# Patient Record
Sex: MALE | Race: WHITE | NOT HISPANIC OR LATINO | Employment: OTHER | RURAL
[De-identification: names, ages, dates, MRNs, and addresses within clinical notes are randomized per-mention and may not be internally consistent; named-entity substitution may affect disease eponyms.]

---

## 2017-10-23 ENCOUNTER — CONVERSION ENCOUNTER (OUTPATIENT)
Dept: FAMILY MEDICINE CLINIC | Facility: CLINIC | Age: 62
End: 2017-10-23

## 2017-10-23 LAB
ALBUMIN SERPL-MCNC: 3.8 G/DL (ref 3.6–5.1)
ALP SERPL-CCNC: 59 U/L (ref 40–115)
AST SERPL-CCNC: 22 U/L (ref 10–35)
BILIRUB SERPL-MCNC: 0.5 MG/DL (ref 0.2–1.2)
BUN SERPL-MCNC: 16 MG/DL (ref 7–25)
BUN/CREAT SERPL: 22.9 (CALC) (ref 6–22)
CALCIUM SERPL-MCNC: 8.6 MG/DL (ref 8.6–10.2)
CHLORIDE SERPL-SCNC: 108 MMOL/L (ref 98–110)
CHOLEST SERPL-MCNC: 82 MG/DL (ref 125–200)
CONV CO2: 23 MMOL/L (ref 21–33)
CONV TOTAL PROTEIN: 6.4 G/DL (ref 6.2–8.3)
CREAT UR-MCNC: 0.7 MG/DL (ref 0.76–1.46)
GLOBULIN UR ELPH-MCNC: 2.6 MG/DL (ref 2.1–3.7)
GLUCOSE UR QL: 99 MG/DL (ref 65–99)
HDLC SERPL-MCNC: 33 MG/DL
INSULIN SERPL-ACNC: 1.5 (CALC) (ref 1–2.1)
LDLC SERPL CALC-MCNC: 31 MG/DL
POTASSIUM SERPL-SCNC: 4.5 MMOL/L (ref 3.5–5.3)
SODIUM SERPL-SCNC: 138 MMOL/L (ref 135–146)
TRIGL SERPL-MCNC: 92 MG/DL

## 2017-12-18 ENCOUNTER — CONVERSION ENCOUNTER (OUTPATIENT)
Dept: FAMILY MEDICINE CLINIC | Facility: CLINIC | Age: 62
End: 2017-12-18

## 2017-12-19 LAB
ALBUMIN SERPL-MCNC: 4.3 G/DL (ref 3.6–5.1)
ALP SERPL-CCNC: 66 U/L (ref 40–115)
ALT SERPL-CCNC: 18 U/L (ref 9–46)
AST SERPL-CCNC: 16 U/L (ref 10–35)
BILIRUB SERPL-MCNC: 0.7 MG/DL (ref 0.2–1.2)
BUN SERPL-MCNC: 15 MG/DL (ref 7–25)
BUN/CREAT SERPL: NORMAL (CALC) (ref 6–22)
CALCIUM SERPL-MCNC: 8.9 MG/DL (ref 8.6–10.3)
CHLORIDE SERPL-SCNC: 105 MMOL/L (ref 98–110)
CHOLEST SERPL-MCNC: 142 MG/DL
CHOLEST/HDLC SERPL: 3.8 (CALC)
CONV CO2: 26 MMOL/L (ref 20–31)
CONV TOTAL PROTEIN: 7.2 G/DL (ref 6.1–8.1)
CREAT UR-MCNC: 0.73 MG/DL (ref 0.7–1.25)
GLOBULIN UR ELPH-MCNC: 2.9 MG/DL (ref 1.9–3.7)
GLUCOSE UR QL: 92 MG/DL (ref 65–99)
HBA1C MFR BLD: 5.7 %
HDLC SERPL-MCNC: 37 MG/DL
INSULIN SERPL-ACNC: 1.5 (CALC) (ref 1–2.5)
LDLC SERPL CALC-MCNC: 86 MG/DL
NONHDLC SERPL-MCNC: 105 MG/DL
POTASSIUM SERPL-SCNC: 4.5 MMOL/L (ref 3.5–5.3)
SODIUM SERPL-SCNC: 138 MMOL/L (ref 135–146)
TRIGL SERPL-MCNC: 101 MG/DL

## 2018-04-24 ENCOUNTER — CONVERSION ENCOUNTER (OUTPATIENT)
Dept: FAMILY MEDICINE CLINIC | Facility: CLINIC | Age: 63
End: 2018-04-24

## 2018-04-25 LAB
ALBUMIN SERPL-MCNC: 4.3 G/DL (ref 3.6–5.1)
ALP SERPL-CCNC: 55 U/L (ref 40–115)
ALT SERPL-CCNC: 15 U/L (ref 9–46)
AST SERPL-CCNC: 12 U/L (ref 10–35)
BILIRUB SERPL-MCNC: 0.8 MG/DL (ref 0.2–1.2)
BUN SERPL-MCNC: 19 MG/DL (ref 7–25)
BUN/CREAT SERPL: ABNORMAL (CALC) (ref 6–22)
CALCIUM SERPL-MCNC: 8.9 MG/DL (ref 8.6–10.3)
CHLORIDE SERPL-SCNC: 104 MMOL/L (ref 98–110)
CHOLEST SERPL-MCNC: 110 MG/DL
CHOLEST/HDLC SERPL: 2.8 (CALC)
CONV CO2: 31 MMOL/L (ref 20–31)
CONV TOTAL PROTEIN: 6.9 G/DL (ref 6.1–8.1)
CREAT UR-MCNC: 0.73 MG/DL (ref 0.7–1.25)
GLOBULIN UR ELPH-MCNC: 2.6 MG/DL (ref 1.9–3.7)
GLUCOSE UR QL: 105 MG/DL (ref 65–99)
HBA1C MFR BLD: 5.8 %
HDLC SERPL-MCNC: 40 MG/DL
INSULIN SERPL-ACNC: 1.7 (CALC) (ref 1–2.5)
LDLC SERPL CALC-MCNC: 55 MG/DL
NONHDLC SERPL-MCNC: 70 MG/DL
POTASSIUM SERPL-SCNC: 4.4 MMOL/L (ref 3.5–5.3)
SODIUM SERPL-SCNC: 139 MMOL/L (ref 135–146)
TRIGL SERPL-MCNC: 74 MG/DL

## 2018-12-03 ENCOUNTER — HOSPITAL ENCOUNTER (OUTPATIENT)
Dept: OTHER | Facility: HOSPITAL | Age: 63
Discharge: HOME | End: 2018-12-03
Attending: FAMILY MEDICINE | Admitting: FAMILY MEDICINE

## 2018-12-10 ENCOUNTER — HOSPITAL ENCOUNTER (OUTPATIENT)
Dept: OTHER | Facility: HOSPITAL | Age: 63
Discharge: HOME | End: 2018-12-10
Attending: FAMILY MEDICINE | Admitting: FAMILY MEDICINE

## 2018-12-26 ENCOUNTER — HOSPITAL ENCOUNTER (OUTPATIENT)
Dept: OTHER | Facility: HOSPITAL | Age: 63
Discharge: HOME | End: 2018-12-26
Attending: FAMILY MEDICINE | Admitting: FAMILY MEDICINE

## 2019-02-13 ENCOUNTER — CONVERSION ENCOUNTER (OUTPATIENT)
Dept: FAMILY MEDICINE CLINIC | Facility: CLINIC | Age: 64
End: 2019-02-13

## 2019-02-13 ENCOUNTER — HOSPITAL ENCOUNTER (OUTPATIENT)
Dept: OTHER | Facility: HOSPITAL | Age: 64
Discharge: HOME | End: 2019-02-13
Attending: FAMILY MEDICINE | Admitting: FAMILY MEDICINE

## 2019-02-14 LAB
ALBUMIN SERPL-MCNC: 4.3 G/DL (ref 3.6–5.1)
ALP SERPL-CCNC: 58 U/L (ref 40–115)
ALT SERPL-CCNC: 21 U/L (ref 9–46)
AST SERPL-CCNC: 14 U/L (ref 10–35)
BILIRUB SERPL-MCNC: 0.7 MG/DL (ref 0.2–1.2)
BUN SERPL-MCNC: 17 MG/DL (ref 7–25)
BUN/CREAT SERPL: ABNORMAL (CALC) (ref 6–22)
CALCIUM SERPL-MCNC: 9.3 MG/DL (ref 8.6–10.3)
CHLORIDE SERPL-SCNC: 103 MMOL/L (ref 98–110)
CHOLEST SERPL-MCNC: 124 MG/DL
CHOLEST/HDLC SERPL: 3.5 (CALC)
CONV CO2: 29 MMOL/L (ref 20–32)
CONV TOTAL PROTEIN: 7.5 G/DL (ref 6.1–8.1)
CREAT UR-MCNC: 0.76 MG/DL (ref 0.7–1.25)
GLOBULIN UR ELPH-MCNC: 3.2 MG/DL (ref 1.9–3.7)
GLUCOSE UR QL: 110 MG/DL (ref 65–99)
HBA1C MFR BLD: 6.1 %
HDLC SERPL-MCNC: 35 MG/DL
INSULIN SERPL-ACNC: 1.3 (CALC) (ref 1–2.5)
LDLC SERPL CALC-MCNC: 68 MG/DL
NONHDLC SERPL-MCNC: 89 MG/DL
POTASSIUM SERPL-SCNC: 4.4 MMOL/L (ref 3.5–5.3)
SODIUM SERPL-SCNC: 139 MMOL/L (ref 135–146)
TRIGL SERPL-MCNC: 128 MG/DL

## 2019-07-08 RX ORDER — ATORVASTATIN CALCIUM 40 MG/1
TABLET, FILM COATED ORAL
Qty: 30 TABLET | Refills: 3 | Status: SHIPPED | OUTPATIENT
Start: 2019-07-08 | End: 2019-08-28 | Stop reason: SDUPTHER

## 2019-07-08 NOTE — TELEPHONE ENCOUNTER
Future Appointments       Provider Department Center    11/20/2019 9:00 AM Zhou Keen MD [facility]    11/26/2019 9:00 AM Zhou Keen MD [facility]

## 2019-07-31 PROBLEM — J45.909 REACTIVE AIRWAY DISEASE: Status: ACTIVE | Noted: 2019-07-31

## 2019-07-31 PROBLEM — M50.20 DISPLACEMENT OF CERVICAL INTERVERTEBRAL DISC: Status: ACTIVE | Noted: 2019-07-31

## 2019-07-31 PROBLEM — E11.43 TYPE 2 DIABETES MELLITUS WITH DIABETIC AUTONOMIC NEUROPATHY, WITHOUT LONG-TERM CURRENT USE OF INSULIN: Status: ACTIVE | Noted: 2019-07-31

## 2019-07-31 PROBLEM — E66.3 OVERWEIGHT: Status: ACTIVE | Noted: 2019-07-31

## 2019-07-31 PROBLEM — J44.9 ASTHMATIC BRONCHITIS , CHRONIC (HCC): Status: ACTIVE | Noted: 2019-07-31

## 2019-07-31 PROBLEM — J30.1 CHRONIC ALLERGIC RHINITIS DUE TO POLLEN, UNSPECIFIED SEASONALITY: Status: ACTIVE | Noted: 2019-07-31

## 2019-07-31 PROBLEM — J44.89 ASTHMATIC BRONCHITIS , CHRONIC: Status: ACTIVE | Noted: 2019-07-31

## 2019-07-31 PROBLEM — Z77.090 HISTORY OF ASBESTOS EXPOSURE: Status: ACTIVE | Noted: 2019-07-31

## 2019-07-31 PROBLEM — M13.0 POLYARTHROPATHY OR POLYARTHRITIS: Status: ACTIVE | Noted: 2019-07-31

## 2019-07-31 PROBLEM — E78.2 MIXED HYPERLIPIDEMIA: Status: ACTIVE | Noted: 2019-07-31

## 2019-07-31 PROBLEM — H91.93 BILATERAL HEARING LOSS: Status: ACTIVE | Noted: 2019-07-31

## 2019-07-31 PROBLEM — R93.89 ABNORMAL COMPUTERIZED AXIAL TOMOGRAPHY OF CHEST: Status: ACTIVE | Noted: 2019-07-31

## 2019-07-31 RX ORDER — FLUTICASONE PROPIONATE 50 MCG
2 SPRAY, SUSPENSION (ML) NASAL DAILY
COMMUNITY
Start: 2019-01-28 | End: 2022-09-12 | Stop reason: SDUPTHER

## 2019-07-31 RX ORDER — CYCLOBENZAPRINE HCL 10 MG
1 TABLET ORAL
COMMUNITY
Start: 2018-11-13 | End: 2021-01-18 | Stop reason: SDUPTHER

## 2019-07-31 RX ORDER — LORATADINE 10 MG/1
1 TABLET ORAL DAILY
COMMUNITY
Start: 2018-11-13 | End: 2022-09-12 | Stop reason: SDUPTHER

## 2019-07-31 RX ORDER — IBUPROFEN 800 MG/1
TABLET ORAL
COMMUNITY
Start: 2018-11-13 | End: 2020-06-24 | Stop reason: SDUPTHER

## 2019-07-31 RX ORDER — ALBUTEROL SULFATE 90 UG/1
2 AEROSOL, METERED RESPIRATORY (INHALATION) EVERY 4 HOURS
COMMUNITY
Start: 2019-02-14 | End: 2020-03-02

## 2019-08-01 ENCOUNTER — OFFICE VISIT (OUTPATIENT)
Dept: FAMILY MEDICINE CLINIC | Facility: CLINIC | Age: 64
End: 2019-08-01

## 2019-08-01 VITALS
HEART RATE: 76 BPM | OXYGEN SATURATION: 97 % | WEIGHT: 249 LBS | SYSTOLIC BLOOD PRESSURE: 136 MMHG | TEMPERATURE: 96.2 F | DIASTOLIC BLOOD PRESSURE: 76 MMHG | BODY MASS INDEX: 35.65 KG/M2 | HEIGHT: 70 IN | RESPIRATION RATE: 18 BRPM

## 2019-08-01 DIAGNOSIS — Z02.4 ENCOUNTER FOR CDL (COMMERCIAL DRIVING LICENSE) EXAM: Primary | ICD-10-CM

## 2019-08-01 DIAGNOSIS — E11.43 TYPE 2 DIABETES MELLITUS WITH DIABETIC AUTONOMIC NEUROPATHY, WITHOUT LONG-TERM CURRENT USE OF INSULIN (HCC): ICD-10-CM

## 2019-08-01 LAB
BILIRUB BLD-MCNC: NEGATIVE MG/DL
CLARITY, POC: CLEAR
COLOR UR: YELLOW
GLUCOSE BLDC GLUCOMTR-MCNC: 146 MG/DL (ref 70–130)
GLUCOSE UR STRIP-MCNC: NEGATIVE MG/DL
HBA1C MFR BLD: 6.5 %
KETONES UR QL: NEGATIVE
LEUKOCYTE EST, POC: NEGATIVE
NITRITE UR-MCNC: NEGATIVE MG/ML
PH UR: 7 [PH] (ref 5–8)
PROT UR STRIP-MCNC: NEGATIVE MG/DL
RBC # UR STRIP: NEGATIVE /UL
SP GR UR: 1.01 (ref 1–1.03)
UROBILINOGEN UR QL: NORMAL

## 2019-08-01 PROCEDURE — 81002 URINALYSIS NONAUTO W/O SCOPE: CPT | Performed by: FAMILY MEDICINE

## 2019-08-01 PROCEDURE — DOTPHY: Performed by: FAMILY MEDICINE

## 2019-08-01 NOTE — PROGRESS NOTES
Subjective   Jeffrey Aviles is a 63 y.o. male    Medical Examination  Chief Complaint   Patient presents with   • 's License Exam       History of Present Illness         I personally reviewed and updated the patient's allergies, medications, problem list, and past medical, surgical, social, and family history.     Family History   Problem Relation Age of Onset   • Cerebral aneurysm Mother          at 42   • Hypertension Mother    • Hyperlipidemia Father    • Hypertension Father    • Breast cancer Sister    • Prostate cancer Maternal Grandfather    • Diabetes Maternal Uncle    • Heart disease Maternal Uncle    • Lung cancer Maternal Uncle        Social History     Tobacco Use   • Smoking status: Never Smoker   • Smokeless tobacco: Never Used   Substance Use Topics   • Alcohol use: No     Frequency: Never   • Drug use: No       Past Surgical History:   Procedure Laterality Date   • PAROTID DUCT LIGATION         Patient Active Problem List   Diagnosis   • Abnormal computerized axial tomography of chest   • Asthmatic bronchitis , chronic (CMS/HCC)   • Bilateral hearing loss   • Chronic allergic rhinitis due to pollen, unspecified seasonality   • Displacement of cervical intervertebral disc   • History of asbestos exposure   • Mixed hyperlipidemia   • Overweight   • Polyarthropathy or polyarthritis   • Reactive airway disease   • Type 2 diabetes mellitus with diabetic autonomic neuropathy, without long-term current use of insulin (CMS/HCC)   • Abnormal chest CT   • Encounter for CDL (commercial driving license) exam         Current Outpatient Medications:   •  albuterol sulfate HFA (PROAIR HFA) 108 (90 Base) MCG/ACT inhaler, Inhale 2 puffs Every 4 (Four) Hours., Disp: , Rfl:   •  aspirin 81 MG tablet, Take 81 mg by mouth Daily., Disp: , Rfl:   •  atorvastatin (LIPITOR) 40 MG tablet, TAKE 1 TABLET BY MOUTH EVERY DAY IN THE EVENING, Disp: 30 tablet, Rfl: 3  •  cyclobenzaprine  "(FLEXERIL) 10 MG tablet, Take 1 tablet by mouth every night at bedtime., Disp: , Rfl:   •  fluticasone (FLONASE) 50 MCG/ACT nasal spray, 2 sprays into the nostril(s) as directed by provider Daily., Disp: , Rfl:   •  ibuprofen (ADVIL,MOTRIN) 800 MG tablet, Take  by mouth., Disp: , Rfl:   •  loratadine (CLARITIN) 10 MG tablet, Take 1 tablet by mouth Daily., Disp: , Rfl:   •  metFORMIN (GLUCOPHAGE) 1000 MG tablet, Take 1 tablet by mouth 2 (Two) Times a Day., Disp: , Rfl:   •  mometasone (ASMANEX TWISTHALER) inhaler 110 mcg/inhalation, Inhale 1 puff Daily., Disp: , Rfl:          Review of Systems   Constitutional: Negative for chills and diaphoresis.   Eyes: Negative for visual disturbance.   Respiratory: Negative for shortness of breath.    Cardiovascular: Negative for chest pain and palpitations.   Gastrointestinal: Negative for abdominal pain and nausea.   Endocrine: Negative for polydipsia and polyphagia.   Musculoskeletal: Negative for neck stiffness.   Skin: Negative for color change and pallor.   Neurological: Negative for seizures and syncope.   Hematological: Negative for adenopathy.       Objective   /76 (BP Location: Left arm, Patient Position: Sitting, Cuff Size: Adult)   Pulse 76   Temp 96.2 °F (35.7 °C) (Oral)   Resp 18   Ht 177.8 cm (70\")   Wt 113 kg (249 lb)   SpO2 97% Comment: Room air  BMI 35.73 kg/m²   Wt Readings from Last 3 Encounters:   08/01/19 113 kg (249 lb)   06/05/19 108 kg (238 lb 2 oz)   02/14/19 106 kg (233 lb 6 oz)       Physical Exam   Constitutional: He is oriented to person, place, and time. He appears well-developed and well-nourished.   Cardiovascular: Normal rate, regular rhythm, S1 normal, S2 normal, normal heart sounds, intact distal pulses and normal pulses. Exam reveals no gallop and no friction rub.   No murmur heard.  Pulmonary/Chest: Effort normal and breath sounds normal. No accessory muscle usage or stridor. He has no decreased breath sounds. He has no wheezes. " He has no rhonchi. He has no rales.   Abdominal: Soft. Normal appearance, normal aorta and bowel sounds are normal. He exhibits no distension, no pulsatile midline mass and no mass. There is no hepatosplenomegaly. There is no tenderness. There is no rigidity, no rebound, no guarding, no CVA tenderness and negative Ordaz's sign. No hernia.   Neurological: He is alert and oriented to person, place, and time. He has normal strength and normal reflexes. He displays no tremor. No cranial nerve deficit or sensory deficit. He exhibits normal muscle tone. He displays no seizure activity. Coordination and gait normal.   Skin: Skin is warm and dry. Turgor is normal. He is not diaphoretic. No pallor.         Assessment/Plan   Problem List Items Addressed This Visit        Endocrine    Type 2 diabetes mellitus with diabetic autonomic neuropathy, without long-term current use of insulin (CMS/[facility])    Overview     In -house A1c done 06/05/19, read by me, reviewed with pt.  A1c was 6.3 up from 6.1  Worsening, pt non-compiant with medication.  Encouraged to watch sugar intake, exercise more and lose weight.   adequate diet and exercise  Not monitoring sugars  Goals developed at last visit were not met   Follow up in 6 months  Care management needs are self addressed.       Self-Management abilities addressed and patient is capable of managing his own disease         Relevant Orders    POCT Glucose (Completed)    POC Glycosylated Hemoglobin (Hb A1C) (Completed)       Other    Encounter for CDL (commercial driving license) exam - Primary    Overview     Doing well, cleared to drive  Diabetes under good control         Relevant Orders    POCT urinalysis dipstick, manual (Completed)              Expected course, medications, and adverse effects discussed.  Call or return if worsening or persistent symptoms.

## 2019-08-15 ENCOUNTER — TELEPHONE (OUTPATIENT)
Dept: FAMILY MEDICINE CLINIC | Facility: CLINIC | Age: 64
End: 2019-08-15

## 2019-08-28 RX ORDER — ATORVASTATIN CALCIUM 40 MG/1
TABLET, FILM COATED ORAL
Qty: 30 TABLET | Refills: 3 | Status: SHIPPED | OUTPATIENT
Start: 2019-08-28 | End: 2019-10-07 | Stop reason: SDUPTHER

## 2019-10-07 RX ORDER — ATORVASTATIN CALCIUM 40 MG/1
TABLET, FILM COATED ORAL
Qty: 30 TABLET | Refills: 3 | Status: SHIPPED | OUTPATIENT
Start: 2019-10-07 | End: 2020-01-20

## 2019-11-20 ENCOUNTER — OFFICE VISIT (OUTPATIENT)
Dept: FAMILY MEDICINE CLINIC | Facility: CLINIC | Age: 64
End: 2019-11-20

## 2019-11-20 DIAGNOSIS — R35.1 NOCTURIA: ICD-10-CM

## 2019-11-20 DIAGNOSIS — R00.1 SINUS BRADYCARDIA: Primary | ICD-10-CM

## 2019-11-20 DIAGNOSIS — E78.2 MIXED HYPERLIPIDEMIA: ICD-10-CM

## 2019-11-20 DIAGNOSIS — Z12.5 SCREENING PSA (PROSTATE SPECIFIC ANTIGEN): ICD-10-CM

## 2019-11-20 DIAGNOSIS — R53.83 LETHARGY: ICD-10-CM

## 2019-11-20 DIAGNOSIS — M54.50 MIDLINE LOW BACK PAIN WITHOUT SCIATICA, UNSPECIFIED CHRONICITY: ICD-10-CM

## 2019-11-20 DIAGNOSIS — E11.43 TYPE 2 DIABETES MELLITUS WITH DIABETIC AUTONOMIC NEUROPATHY, WITHOUT LONG-TERM CURRENT USE OF INSULIN (HCC): ICD-10-CM

## 2019-11-20 DIAGNOSIS — H90.0 CONDUCTIVE HEARING LOSS, BILATERAL: ICD-10-CM

## 2019-11-20 LAB
BILIRUB BLD-MCNC: NEGATIVE MG/DL
CLARITY, POC: CLEAR
COLOR UR: YELLOW
GLUCOSE UR STRIP-MCNC: NEGATIVE MG/DL
KETONES UR QL: NEGATIVE
LEUKOCYTE EST, POC: NEGATIVE
NITRITE UR-MCNC: NEGATIVE MG/ML
PH UR: 5 [PH] (ref 5–8)
PROT UR STRIP-MCNC: NEGATIVE MG/DL
RBC # UR STRIP: NEGATIVE /UL
SP GR UR: 1.02 (ref 1–1.03)
UROBILINOGEN UR QL: NORMAL

## 2019-11-20 PROCEDURE — 99214 OFFICE O/P EST MOD 30 MIN: CPT | Performed by: FAMILY MEDICINE

## 2019-11-20 PROCEDURE — 36415 COLL VENOUS BLD VENIPUNCTURE: CPT | Performed by: FAMILY MEDICINE

## 2019-11-20 PROCEDURE — 81002 URINALYSIS NONAUTO W/O SCOPE: CPT | Performed by: FAMILY MEDICINE

## 2019-11-20 PROCEDURE — 93000 ELECTROCARDIOGRAM COMPLETE: CPT | Performed by: FAMILY MEDICINE

## 2019-11-20 NOTE — PROGRESS NOTES
Subjective   Jeffrey Aviles JR is a 64 y.o. male.     Diabetes   He presents for his follow-up diabetic visit. He has type 2 diabetes mellitus. His disease course has been stable. There are no hypoglycemic associated symptoms. Associated symptoms include fatigue. Pertinent negatives for diabetes include no chest pain. There are no hypoglycemic complications. Symptoms are stable. There are no diabetic complications.   Back Pain   This is a recurrent problem. The current episode started more than 1 month ago. The problem occurs intermittently. The problem has been gradually worsening since onset. The pain is present in the lumbar spine. The symptoms are aggravated by sitting and position. Stiffness is present all day. Pertinent negatives include no chest pain.   Heart Problem   This is a recurrent (bradycardia) problem. The current episode started more than 1 year ago. The problem occurs intermittently. Associated symptoms include fatigue. Pertinent negatives include no chest pain or joint swelling.   Urinary Frequency    This is a recurrent problem. The current episode started more than 1 year ago. The problem occurs intermittently. The problem has been unchanged. Associated symptoms include frequency (nocturia).        The following portions of the patient's history were reviewed and updated as appropriate: current medications, past family history, past medical history, past social history, past surgical history and problem list.    Family History   Problem Relation Age of Onset   • Cerebral aneurysm Mother          at 42   • Hypertension Mother    • Hyperlipidemia Father    • Hypertension Father    • Breast cancer Sister    • Prostate cancer Maternal Grandfather    • Diabetes Maternal Uncle    • Heart disease Maternal Uncle    • Lung cancer Maternal Uncle        Social History     Tobacco Use   • Smoking status: Never Smoker   • Smokeless tobacco: Never Used   Substance Use Topics   • Alcohol use: No      Frequency: Never   • Drug use: No       Past Surgical History:   Procedure Laterality Date   • PAROTID DUCT LIGATION         Patient Active Problem List   Diagnosis   • Abnormal computerized axial tomography of chest   • Asthmatic bronchitis , chronic (CMS/HCC)   • Bilateral hearing loss   • Chronic allergic rhinitis due to pollen, unspecified seasonality   • Displacement of cervical intervertebral disc   • History of asbestos exposure   • Mixed hyperlipidemia   • Overweight   • Polyarthropathy or polyarthritis   • Reactive airway disease   • Type 2 diabetes mellitus with diabetic autonomic neuropathy, without long-term current use of insulin (CMS/HCC)   • Abnormal chest CT   • Encounter for CDL (commercial driving license) exam   • Sinus bradycardia   • Screening PSA (prostate specific antigen)   • Lethargy   • Low back pain   • Nocturia       Current Outpatient Medications on File Prior to Visit   Medication Sig Dispense Refill   • albuterol sulfate HFA (PROAIR HFA) 108 (90 Base) MCG/ACT inhaler Inhale 2 puffs Every 4 (Four) Hours.     • aspirin 81 MG tablet Take 81 mg by mouth Daily.     • atorvastatin (LIPITOR) 40 MG tablet TAKE 1 TABLET BY MOUTH EVERY DAY IN THE EVENING 30 tablet 3   • cyclobenzaprine (FLEXERIL) 10 MG tablet Take 1 tablet by mouth every night at bedtime.     • fluticasone (FLONASE) 50 MCG/ACT nasal spray 2 sprays into the nostril(s) as directed by provider Daily.     • ibuprofen (ADVIL,MOTRIN) 800 MG tablet Take  by mouth.     • loratadine (CLARITIN) 10 MG tablet Take 1 tablet by mouth Daily.     • metFORMIN (GLUCOPHAGE) 1000 MG tablet TAKE 1 TABLET BY MOUTH TWICE A DAY 60 tablet 2   • mometasone (ASMANEX TWISTHALER) inhaler 110 mcg/inhalation Inhale 1 puff Daily.       No current facility-administered medications on file prior to visit.        No Known Allergies    Review of Systems   Constitutional: Positive for fatigue.   HENT: Negative for hearing loss.    Respiratory: Negative for  shortness of breath.    Cardiovascular: Negative for chest pain and palpitations.   Genitourinary: Positive for frequency (nocturia).        Nocturia   Musculoskeletal: Positive for back pain. Negative for joint swelling.   Neurological: Negative for memory problem.       Objective   There were no vitals taken for this visit.  Physical Exam   Constitutional: He is oriented to person, place, and time. He appears well-developed and well-nourished. He is cooperative.   HENT:   Head: Normocephalic.   Neck: Trachea normal. Neck supple. Carotid bruit is not present. No thyromegaly present.   Cardiovascular: Normal rate and regular rhythm. Exam reveals no gallop and no friction rub.   No murmur heard.  Pulmonary/Chest: Effort normal and breath sounds normal. He has no wheezes.   Musculoskeletal:        Arms:   Jeffrey had a diabetic foot exam performed (all normal) today.  Neurological: He is alert and oriented to person, place, and time. No cranial nerve deficit.   Skin: Skin is dry. No rash noted. Nails show no clubbing.   Nursing note and vitals reviewed.        Assessment/Plan .  Problem List Items Addressed This Visit        Cardiovascular and Mediastinum    Mixed hyperlipidemia    Overview     Will draw labs today and discuss results at next visit.          Relevant Orders    Lipid Panel With / Chol / HDL Ratio (Completed)    Comprehensive Metabolic Panel (Completed)    Sinus bradycardia - Primary    Current Assessment & Plan     EKG done today shows sinus tonja.  Flow pulses         Relevant Orders    ECG 12 Lead       Endocrine    Type 2 diabetes mellitus with diabetic autonomic neuropathy, without long-term current use of insulin (CMS/[facility])    Overview     Will draw labs today and discuss results at next visit.  Monofilment done today and was normal.         Current Assessment & Plan     Needs a micro album         Relevant Orders    Hemoglobin A1c (Completed)       Nervous and Auditory    Bilateral hearing loss     Overview     Patient declines audiometry.          Low back pain    Current Assessment & Plan     Probably due to paraspinous muscle spasm.  Recommended stretching, massage, apply ICE and heat.  Offered xrays, patient prefers to hold on this for now,         Relevant Orders    CK (Completed)       Genitourinary    Nocturia    Current Assessment & Plan     Mild- UA done 11-20-19 was normal  Will follow conservatively         Relevant Orders    POCT urinalysis dipstick, manual (Completed)       Other    Lethargy    Overview     Worsening;  Will draw labs today and discuss results at next visit.          Relevant Orders    TSH (Completed)    CBC & Differential (Completed)    Screening PSA (prostate specific antigen)    Relevant Orders    PSA Screen (Completed)          ECG 12 Lead  Date/Time: 11/20/2019 9:55 AM  Performed by: Zhou Keen MD  Authorized by: Zhou Keen MD   Comparison: compared with previous ECG from 6/26/2017  Rhythm: sinus bradycardia  Rate: normal  Conduction: conduction normal  ST Segments: ST segments normal  T Waves: T waves normal  QRS axis: normal  Other findings: non-specific ST-T wave changes    Clinical impression: normal ECG

## 2019-11-20 NOTE — ASSESSMENT & PLAN NOTE
Probably due to paraspinous muscle spasm.  Recommended stretching, massage, apply ICE and heat.  Offered xrays, patient prefers to hold on this for now,

## 2019-11-21 LAB
ALBUMIN SERPL-MCNC: 4.2 G/DL (ref 3.6–4.8)
ALBUMIN/GLOB SERPL: 1.7 {RATIO} (ref 1.2–2.2)
ALP SERPL-CCNC: 62 IU/L (ref 39–117)
ALT SERPL-CCNC: 23 IU/L (ref 0–44)
AST SERPL-CCNC: 15 IU/L (ref 0–40)
BASOPHILS # BLD AUTO: 0 X10E3/UL (ref 0–0.2)
BASOPHILS NFR BLD AUTO: 0 %
BILIRUB SERPL-MCNC: 0.7 MG/DL (ref 0–1.2)
BUN SERPL-MCNC: 18 MG/DL (ref 8–27)
BUN/CREAT SERPL: 25 (ref 10–24)
CALCIUM SERPL-MCNC: 8.7 MG/DL (ref 8.6–10.2)
CHLORIDE SERPL-SCNC: 102 MMOL/L (ref 96–106)
CHOLEST SERPL-MCNC: 111 MG/DL (ref 100–199)
CHOLEST/HDLC SERPL: 3 RATIO (ref 0–5)
CK SERPL-CCNC: 63 U/L (ref 24–204)
CO2 SERPL-SCNC: 22 MMOL/L (ref 20–29)
CREAT SERPL-MCNC: 0.73 MG/DL (ref 0.76–1.27)
EOSINOPHIL # BLD AUTO: 0.3 X10E3/UL (ref 0–0.4)
EOSINOPHIL NFR BLD AUTO: 4 %
ERYTHROCYTE [DISTWIDTH] IN BLOOD BY AUTOMATED COUNT: 14.1 % (ref 12.3–15.4)
GLOBULIN SER CALC-MCNC: 2.5 G/DL (ref 1.5–4.5)
GLUCOSE SERPL-MCNC: 108 MG/DL (ref 65–99)
HBA1C MFR BLD: 6.3 % (ref 4.8–5.6)
HCT VFR BLD AUTO: 43.5 % (ref 37.5–51)
HDLC SERPL-MCNC: 37 MG/DL
HGB BLD-MCNC: 14.2 G/DL (ref 13–17.7)
IMM GRANULOCYTES # BLD AUTO: 0 X10E3/UL (ref 0–0.1)
IMM GRANULOCYTES NFR BLD AUTO: 0 %
LDLC SERPL CALC-MCNC: 51 MG/DL (ref 0–99)
LYMPHOCYTES # BLD AUTO: 3.5 X10E3/UL (ref 0.7–3.1)
LYMPHOCYTES NFR BLD AUTO: 38 %
MCH RBC QN AUTO: 29.9 PG (ref 26.6–33)
MCHC RBC AUTO-ENTMCNC: 32.6 G/DL (ref 31.5–35.7)
MCV RBC AUTO: 92 FL (ref 79–97)
MONOCYTES # BLD AUTO: 0.7 X10E3/UL (ref 0.1–0.9)
MONOCYTES NFR BLD AUTO: 8 %
NEUTROPHILS # BLD AUTO: 4.7 X10E3/UL (ref 1.4–7)
NEUTROPHILS NFR BLD AUTO: 50 %
PLATELET # BLD AUTO: 248 X10E3/UL (ref 150–450)
POTASSIUM SERPL-SCNC: 4.2 MMOL/L (ref 3.5–5.2)
PROT SERPL-MCNC: 6.7 G/DL (ref 6–8.5)
PSA SERPL-MCNC: 0.5 NG/ML (ref 0–4)
RBC # BLD AUTO: 4.75 X10E6/UL (ref 4.14–5.8)
SODIUM SERPL-SCNC: 138 MMOL/L (ref 134–144)
TRIGL SERPL-MCNC: 115 MG/DL (ref 0–149)
TSH SERPL DL<=0.005 MIU/L-ACNC: 2.06 UIU/ML (ref 0.45–4.5)
VLDLC SERPL CALC-MCNC: 23 MG/DL (ref 5–40)
WBC # BLD AUTO: 9.3 X10E3/UL (ref 3.4–10.8)

## 2019-11-25 DIAGNOSIS — E11.43 TYPE 2 DIABETES MELLITUS WITH DIABETIC AUTONOMIC NEUROPATHY, WITHOUT LONG-TERM CURRENT USE OF INSULIN (HCC): Primary | ICD-10-CM

## 2019-11-27 ENCOUNTER — OFFICE VISIT (OUTPATIENT)
Dept: FAMILY MEDICINE CLINIC | Facility: CLINIC | Age: 64
End: 2019-11-27

## 2019-11-27 VITALS
WEIGHT: 243.6 LBS | RESPIRATION RATE: 18 BRPM | OXYGEN SATURATION: 96 % | HEART RATE: 72 BPM | HEIGHT: 70 IN | BODY MASS INDEX: 34.88 KG/M2 | TEMPERATURE: 98.3 F | DIASTOLIC BLOOD PRESSURE: 75 MMHG | SYSTOLIC BLOOD PRESSURE: 116 MMHG

## 2019-11-27 DIAGNOSIS — M54.50 MIDLINE LOW BACK PAIN WITHOUT SCIATICA, UNSPECIFIED CHRONICITY: ICD-10-CM

## 2019-11-27 DIAGNOSIS — R00.1 SINUS BRADYCARDIA: ICD-10-CM

## 2019-11-27 DIAGNOSIS — Z83.71 FAMILY HISTORY OF POLYPS IN THE COLON: ICD-10-CM

## 2019-11-27 DIAGNOSIS — H83.3X3 NOISE-INDUCED HEARING LOSS OF BOTH EARS: ICD-10-CM

## 2019-11-27 DIAGNOSIS — Z77.090 HISTORY OF ASBESTOS EXPOSURE: ICD-10-CM

## 2019-11-27 DIAGNOSIS — E66.3 OVERWEIGHT: ICD-10-CM

## 2019-11-27 DIAGNOSIS — K52.9 GASTROENTERITIS, ACUTE: ICD-10-CM

## 2019-11-27 DIAGNOSIS — M50.20 DISPLACEMENT OF CERVICAL INTERVERTEBRAL DISC: Primary | ICD-10-CM

## 2019-11-27 DIAGNOSIS — E78.2 MIXED HYPERLIPIDEMIA: ICD-10-CM

## 2019-11-27 DIAGNOSIS — M79.10 MYALGIA: ICD-10-CM

## 2019-11-27 DIAGNOSIS — M19.90 ARTHRITIS: ICD-10-CM

## 2019-11-27 DIAGNOSIS — J45.909 REACTIVE AIRWAY DISEASE WITHOUT COMPLICATION, UNSPECIFIED ASTHMA SEVERITY, UNSPECIFIED WHETHER PERSISTENT: ICD-10-CM

## 2019-11-27 DIAGNOSIS — R93.89 ABNORMAL COMPUTERIZED AXIAL TOMOGRAPHY OF CHEST: ICD-10-CM

## 2019-11-27 DIAGNOSIS — Z12.5 SCREENING PSA (PROSTATE SPECIFIC ANTIGEN): ICD-10-CM

## 2019-11-27 DIAGNOSIS — E11.43 TYPE 2 DIABETES MELLITUS WITH DIABETIC AUTONOMIC NEUROPATHY, WITHOUT LONG-TERM CURRENT USE OF INSULIN (HCC): ICD-10-CM

## 2019-11-27 DIAGNOSIS — Z00.00 ANNUAL PHYSICAL EXAM: ICD-10-CM

## 2019-11-27 DIAGNOSIS — Z02.4 ENCOUNTER FOR CDL (COMMERCIAL DRIVING LICENSE) EXAM: ICD-10-CM

## 2019-11-27 DIAGNOSIS — R93.89 ABNORMAL CHEST CT: ICD-10-CM

## 2019-11-27 DIAGNOSIS — R35.1 NOCTURIA: ICD-10-CM

## 2019-11-27 DIAGNOSIS — M62.830 PARASPINAL MUSCLE SPASM: ICD-10-CM

## 2019-11-27 DIAGNOSIS — R53.83 LETHARGY: ICD-10-CM

## 2019-11-27 DIAGNOSIS — J44.9 ASTHMATIC BRONCHITIS , CHRONIC (HCC): ICD-10-CM

## 2019-11-27 DIAGNOSIS — J30.1 CHRONIC ALLERGIC RHINITIS DUE TO POLLEN: ICD-10-CM

## 2019-11-27 DIAGNOSIS — J06.9 UPPER RESPIRATORY TRACT INFECTION, UNSPECIFIED TYPE: ICD-10-CM

## 2019-11-27 PROBLEM — Z83.719 FAMILY HISTORY OF POLYPS IN THE COLON: Status: ACTIVE | Noted: 2019-11-27

## 2019-11-27 PROCEDURE — 99396 PREV VISIT EST AGE 40-64: CPT | Performed by: FAMILY MEDICINE

## 2019-11-27 PROCEDURE — 99213 OFFICE O/P EST LOW 20 MIN: CPT | Performed by: FAMILY MEDICINE

## 2019-11-27 NOTE — ASSESSMENT & PLAN NOTE
Labs done 11-20-19, read by me, reviewed with pt.  Trig. 115 down from 127, Tot. Chol. 111 down from 147, HDL 37 down from 38, LDL 51 down from 84.  Improved.   Encouraged to watch fatty intake, exercise more, and lose weight.   Compliant with medication    Is not getting adequate diet and exercise  Goals developed at last visit were not met because diet and exercise  Follow up in  4 months  Care management needs are self-addressed.  Self-management abilities addressed and patient is capable of managing his own disease.

## 2019-11-27 NOTE — ASSESSMENT & PLAN NOTE
Mild.  Labs done 11-20-19, read by me, reviewed with pt.  TSH was normal, CBC showed Lymphocytes was 3.5 up from 3,089

## 2019-11-27 NOTE — ASSESSMENT & PLAN NOTE
Encouraged to do self-breast exam, self-testicle exams, and self derm exams. Congratulated on using seat belts.  Encouraged to do annual physical exams.  Immunization status reviewed.

## 2019-11-27 NOTE — ASSESSMENT & PLAN NOTE
Labs done 11-20-19, read by me, reviewed with pt.  A1c was 6.3 down from 6.5  Improved  Encouraged to watch sugar intake, exercise more and lose weight.   Compliant with medication.   Not monitoring sugar at home.   Follow up in 4 months  Care management needs are self-addressed.  Self-management abilities addressed and patient is capable of managing his own disease.

## 2019-11-27 NOTE — PROGRESS NOTES
Subjective   Jeffrey Aviles JR is a 64 y.o. male here for his annual physical with me. Jeffrey is here for coordination of medical care, to discuss health maintenance, disease prevention as well as to followup on medical problems. Patient has been followed by me since 1986. Patient's last CPE was 11-13-18. Activity level is moderate. Exercises 0 per week. Appetite is good. Feels poorly with few complaints. Energy level is poor. Sleeps well. Patient's last colonoscopy was 2016. He is advised to repeat in 5 years. Patient is doing routine self skin exam twice a year. Patient is doing routine self-breast exams twice a year. Patient is checking testicles twice a year.    Lab Results   Component Value Date    PSA 0.5 11/20/2019        URI    This is a new problem. The current episode started in the past 7 days. The problem has been rapidly improving. There has been no fever. Associated symptoms include abdominal pain, congestion, coughing, ear pain, headaches, sneezing and vomiting. Pertinent negatives include no chest pain, diarrhea, dysuria, nausea, neck pain, rash, rhinorrhea, sore throat or wheezing. He has tried nothing for the symptoms.   Vomiting    This is a new problem. The current episode started yesterday. The problem has been gradually improving. The emesis has an appearance of stomach contents. Associated symptoms include abdominal pain, arthralgias, chills, coughing, headaches, sweats and URI. Pertinent negatives include no chest pain, diarrhea, dizziness, fever, myalgias or weight loss. Risk factors include suspect food intake. He has tried increased fluids for the symptoms. The treatment provided mild relief.   Hyperlipidemia   This is a chronic problem. The current episode started more than 1 year ago. The problem is controlled. Recent lipid tests were reviewed and are high. Exacerbating diseases include diabetes. Factors aggravating his hyperlipidemia include fatty foods. Pertinent negatives include no  chest pain, myalgias or shortness of breath. Current antihyperlipidemic treatment includes statins. The current treatment provides mild improvement of lipids. There are no compliance problems.  Risk factors for coronary artery disease include diabetes mellitus, dyslipidemia and hypertension.   Arthritis   Presents for follow-up visit. He reports no joint swelling. The symptoms have been stable. Pertinent negatives include no diarrhea, dysuria, fatigue, fever, rash or weight loss.   Back Pain   This is a chronic problem. The current episode started more than 1 year ago. The problem occurs intermittently. The problem is unchanged. The pain is present in the lumbar spine. Associated symptoms include abdominal pain and headaches. Pertinent negatives include no chest pain, dysuria, fever, weakness or weight loss.        The following portions of the patient's history were reviewed and updated as appropriate: allergies, current medications, past family history, past medical history, past social history, past surgical history and problem list.    Past Medical History:   Diagnosis Date   • Abnormal chest CT    • Bilateral hearing loss    • History of asbestos exposure    • Mixed hyperlipidemia        Family History   Problem Relation Age of Onset   • Cerebral aneurysm Mother          at 42   • Hypertension Mother    • Hyperlipidemia Father    • Hypertension Father    • Other Father         spinal stenosis   • Asthma Father         living at 86   • Breast cancer Sister    • Hyperlipidemia Sister    • Prostate cancer Maternal Grandfather    • Diabetes Maternal Uncle    • Heart disease Maternal Uncle    • Lung cancer Maternal Uncle    • Hypertension Brother    • Hyperlipidemia Brother    • Other Brother 30        Hepatitis   • Stroke Maternal Grandmother        Social History     Socioeconomic History   • Marital status:      Spouse name: Not on file   • Number of children: Not on file   • Years of education: Not on  file   • Highest education level: Not on file   Tobacco Use   • Smoking status: Never Smoker   • Smokeless tobacco: Never Used   Substance and Sexual Activity   • Alcohol use: No     Frequency: Never   • Drug use: No   Social History Narrative     2 x.  First wife  for 2.5 years and no children with her.   2nd in 1977 has 1 son together and he adopted his wives daughter.  Just the 2 of them at home, children are out of the house.  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       Past Surgical History:   Procedure Laterality Date   • PAROTID DUCT LIGATION         Current Outpatient Medications on File Prior to Visit   Medication Sig Dispense Refill   • albuterol sulfate HFA (PROAIR HFA) 108 (90 Base) MCG/ACT inhaler Inhale 2 puffs Every 4 (Four) Hours.     • aspirin 81 MG tablet Take 81 mg by mouth Daily.     • atorvastatin (LIPITOR) 40 MG tablet TAKE 1 TABLET BY MOUTH EVERY DAY IN THE EVENING 30 tablet 3   • cyclobenzaprine (FLEXERIL) 10 MG tablet Take 1 tablet by mouth every night at bedtime.     • fluticasone (FLONASE) 50 MCG/ACT nasal spray 2 sprays into the nostril(s) as directed by provider Daily.     • ibuprofen (ADVIL,MOTRIN) 800 MG tablet Take  by mouth.     • loratadine (CLARITIN) 10 MG tablet Take 1 tablet by mouth Daily.     • metFORMIN (GLUCOPHAGE) 1000 MG tablet TAKE 1 TABLET BY MOUTH TWICE A DAY 60 tablet 0   • mometasone (ASMANEX TWISTHALER) inhaler 110 mcg/inhalation Inhale 1 puff Daily.       No current facility-administered medications on file prior to visit.        No Known Allergies    Review of Systems   Constitutional: Positive for chills. Negative for appetite change, fatigue, fever, unexpected weight gain and unexpected weight loss.   HENT: Positive for congestion, ear pain and sneezing. Negative for dental problem, ear discharge, hearing loss, nosebleeds, postnasal drip, rhinorrhea, sinus pressure, sore  "throat, tinnitus and voice change.         Last dental exam   Dr. Blackburn-doing well   Eyes: Negative for blurred vision, double vision, pain, redness and visual disturbance.        Last Eye exam 9-2018 [facility]-advised to follow   Respiratory: Positive for cough. Negative for shortness of breath, wheezing and stridor.    Cardiovascular: Negative for chest pain, palpitations and leg swelling.   Gastrointestinal: Positive for abdominal pain, vomiting and indigestion. Negative for anal bleeding, blood in stool, constipation, diarrhea, nausea, rectal pain and GERD.        14 BM weekly  Vomited 1x last night.   Endocrine: Negative for cold intolerance, heat intolerance, polydipsia, polyphagia and polyuria.        Sex Drive  He is a 5  She is a 1   Genitourinary: Negative for difficulty urinating, dysuria, frequency, hematuria and urgency.   Musculoskeletal: Positive for arthralgias, arthritis and back pain. Negative for joint swelling, myalgias, neck pain and neck stiffness.   Skin: Negative for color change, dry skin and rash.   Neurological: Positive for headache. Negative for dizziness, syncope, speech difficulty, weakness, light-headedness and memory problem.   Hematological: Does not bruise/bleed easily.   Psychiatric/Behavioral: Negative for decreased concentration, sleep disturbance, depressed mood and stress. The patient is not nervous/anxious.        Objective   Visit Vitals  /75 (BP Location: Left arm, Patient Position: Sitting, Cuff Size: Large Adult)   Pulse 72   Temp 98.3 °F (36.8 °C) (Oral)   Resp 18   Ht 177.8 cm (70\")   Wt 110 kg (243 lb 9.6 oz)   SpO2 96%   BMI 34.95 kg/m²     Physical Exam   Constitutional: He is oriented to person, place, and time. He appears well-developed and well-nourished. No distress.   HENT:   Head: Normocephalic.   Right Ear: Tympanic membrane, external ear and ear canal normal. Decreased hearing is noted.   Left Ear: Tympanic membrane, external ear and " ear canal normal. Decreased hearing is noted.   Nose: Nose normal.   Mouth/Throat: Oropharynx is clear and moist and mucous membranes are normal. No oropharyngeal exudate.   Mild decreased hearing bilateral.   Eyes: Conjunctivae, EOM and lids are normal. Pupils are equal, round, and reactive to light. Right eye exhibits no discharge. Left eye exhibits no discharge. No scleral icterus.   Wears glasses.  Small pupils fundi hard to visualize.   Neck: Normal range of motion and full passive range of motion without pain. Neck supple.   Cardiovascular: Normal rate, regular rhythm, normal heart sounds and intact distal pulses.   No murmur heard.  Pulses:       Dorsalis pedis pulses are 1+ on the right side, and 0 on the left side.        Posterior tibial pulses are 0 on the right side, and 0 on the left side.   Pulmonary/Chest: Effort normal and breath sounds normal. He has no wheezes. He exhibits no tenderness.   Abdominal: Soft. Bowel sounds are normal. He exhibits no distension and no mass. There is no tenderness. No hernia.   Genitourinary: Rectum normal, prostate normal, testes normal and penis normal. Uncircumcised. No penile tenderness.   Musculoskeletal: Normal range of motion. He exhibits no edema, tenderness or deformity.   Lymphadenopathy:     He has no cervical adenopathy.   Neurological: He is alert and oriented to person, place, and time. He has normal strength. He displays normal reflexes. No cranial nerve deficit or sensory deficit. He exhibits normal muscle tone. Coordination normal.   Skin: Skin is warm and dry. Lesion noted. No rash noted. He is not diaphoretic. No erythema.   Skin tag of the left axillae.  Seborrheic keratosis scattered cover the trunk.  Nevus of the left lower eyelid.  Left buttock mildly irregular skin tag.     Psychiatric: He has a normal mood and affect. His behavior is normal. Judgment and thought content normal.       Assessment/Plan   Problem List Items Addressed This Visit         Cardiovascular and Mediastinum    Mixed hyperlipidemia    Current Assessment & Plan     Labs done 11-20-19, read by me, reviewed with pt.  Trig. 115 down from 127, Tot. Chol. 111 down from 147, HDL 37 down from 38, LDL 51 down from 84.  Improved.   Encouraged to watch fatty intake, exercise more, and lose weight.   Compliant with medication    Is not getting adequate diet and exercise  Goals developed at last visit were not met because diet and exercise  Follow up in  4 months  Care management needs are self-addressed.  Self-management abilities addressed and patient is capable of managing his own disease.           Relevant Orders    Lipid Panel w/ Chol/HDL Ratio    Comprehensive Metabolic Panel       Respiratory    Chronic allergic rhinitis due to pollen    Current Assessment & Plan     Doing well with therapy         Reactive airway disease    Current Assessment & Plan     Doing well         URI (upper respiratory infection)    Current Assessment & Plan     New dx. Pt. Advised to increase fluids and rest, if not improving to return.            Digestive    Gastroenteritis, acute    Current Assessment & Plan     New dx.  Pt. Advised to rest and to return with worsens.            Endocrine    Type 2 diabetes mellitus with diabetic autonomic neuropathy, without long-term current use of insulin (CMS/[facility])    Current Assessment & Plan     Labs done 11-20-19, read by me, reviewed with pt.  A1c was 6.3 down from 6.5  Improved  Encouraged to watch sugar intake, exercise more and lose weight.   Compliant with medication.   Not monitoring sugar at home.   Follow up in 4 months  Care management needs are self-addressed.  Self-management abilities addressed and patient is capable of managing his own disease.           Relevant Orders    Hemoglobin A1c       Nervous and Auditory    Bilateral hearing loss    Current Assessment & Plan     Patient declines audiometry.  Pt. Advised to wear hearing protection and avoid noise  exposure.         Myalgia    Overview     Mild with nl cpk            Musculoskeletal and Integument    Arthritis    Current Assessment & Plan     Mild and stable         Displacement of cervical intervertebral disc - Primary    Current Assessment & Plan     Stable         Paraspinal muscle spasm    Overview     L-spine         Current Assessment & Plan     Advised pt. That physical therapy will help.            Other    Abnormal chest CT    Current Assessment & Plan     Abnormal probably due to resolving pneumonia         Annual physical exam    Overview     Medical records thoroughly reviewed and summarized in emr, since last PE         Current Assessment & Plan     Encouraged to do self-breast exam, self-testicle exams, and self derm exams. Congratulated on using seat belts.  Encouraged to do annual physical exams.  Immunization status reviewed.             Family history of polyps in the colon    Current Assessment & Plan     Advised repeat colonoscopy 2022         History of asbestos exposure    Overview       Short peroid of time at age 19         Current Assessment & Plan     Offered chest xray, pt. declines at present         Lethargy    Current Assessment & Plan     Mild.  Labs done 11-20-19, read by me, reviewed with pt.  TSH was normal, CBC showed Lymphocytes was 3.5 up from 3,089         Overweight    Current Assessment & Plan     Worse, pt. Gained 1 Lb         Screening PSA (prostate specific antigen)    Current Assessment & Plan     Doing well.  Labs done 11-20-19, read by me, reviewed with pt.  PSA was 0.5 down from 0.6           Other Visit Diagnoses     Sinus bradycardia        Asthmatic bronchitis , chronic (CMS/HCC)        Midline low back pain without sciatica, unspecified chronicity        Nocturia        Abnormal computerized axial tomography of chest        Encounter for CDL (commercial driving license) exam                   Encouraged to check his skin, testicles and breasts monthly.  Reviewed exercising regularly, eating a balanced diet, immunizations and if due, patient counselled to check with insurance company for coverage; and regularly checking skin and breasts.

## 2019-11-30 PROBLEM — M79.10 MYALGIA: Status: ACTIVE | Noted: 2019-11-30

## 2020-01-03 ENCOUNTER — OFFICE VISIT (OUTPATIENT)
Dept: FAMILY MEDICINE CLINIC | Facility: CLINIC | Age: 65
End: 2020-01-03

## 2020-01-03 VITALS
RESPIRATION RATE: 18 BRPM | TEMPERATURE: 98.1 F | HEIGHT: 70 IN | DIASTOLIC BLOOD PRESSURE: 72 MMHG | BODY MASS INDEX: 35.65 KG/M2 | OXYGEN SATURATION: 97 % | HEART RATE: 72 BPM | SYSTOLIC BLOOD PRESSURE: 108 MMHG | WEIGHT: 249 LBS

## 2020-01-03 DIAGNOSIS — J45.909 REACTIVE AIRWAY DISEASE WITHOUT COMPLICATION, UNSPECIFIED ASTHMA SEVERITY, UNSPECIFIED WHETHER PERSISTENT: Primary | ICD-10-CM

## 2020-01-03 DIAGNOSIS — J06.9 UPPER RESPIRATORY TRACT INFECTION, UNSPECIFIED TYPE: ICD-10-CM

## 2020-01-03 PROCEDURE — 99213 OFFICE O/P EST LOW 20 MIN: CPT | Performed by: FAMILY MEDICINE

## 2020-01-03 RX ORDER — DOXYCYCLINE 100 MG/1
100 CAPSULE ORAL 2 TIMES DAILY
Qty: 20 CAPSULE | Refills: 0 | Status: SHIPPED | OUTPATIENT
Start: 2020-01-03 | End: 2020-01-13

## 2020-01-03 NOTE — PROGRESS NOTES
Subjective   Jeffrey Aviles JR is a 64 y.o. male.     Cough   Episode onset: 2 weeks. The problem occurs hourly. The cough is productive of sputum (comes and goes). Associated symptoms include a fever (one day). Pertinent negatives include no chest pain, ear pain, rash, shortness of breath or wheezing. Associated symptoms comments: Cough worse at night, no other symptoms.. He has tried steroid inhaler for the symptoms. The treatment provided moderate relief. His past medical history is significant for bronchitis and pneumonia.        The following portions of the patient's history were reviewed and updated as appropriate: allergies, current medications, past family history, past medical history, past social history, past surgical history and problem list.    Patient Active Problem List   Diagnosis   • Bilateral hearing loss   • Chronic allergic rhinitis due to pollen   • Displacement of cervical intervertebral disc   • History of asbestos exposure   • Mixed hyperlipidemia   • Overweight   • Reactive airway disease   • Type 2 diabetes mellitus with diabetic autonomic neuropathy, without long-term current use of insulin (CMS/[facility])   • Abnormal chest CT   • Screening PSA (prostate specific antigen)   • Lethargy   • Arthritis   • Paraspinal muscle spasm   • Family history of polyps in the colon   • Annual physical exam   • Gastroenteritis, acute   • URI (upper respiratory infection)   • Myalgia       Current Outpatient Medications on File Prior to Visit   Medication Sig Dispense Refill   • albuterol sulfate HFA (PROAIR HFA) 108 (90 Base) MCG/ACT inhaler Inhale 2 puffs Every 4 (Four) Hours.     • aspirin 81 MG tablet Take 81 mg by mouth Daily.     • atorvastatin (LIPITOR) 40 MG tablet TAKE 1 TABLET BY MOUTH EVERY DAY IN THE EVENING 30 tablet 3   • cyclobenzaprine (FLEXERIL) 10 MG tablet Take 1 tablet by mouth every night at bedtime.     • fluticasone (FLONASE) 50 MCG/ACT nasal spray 2 sprays into the nostril(s) as directed  by provider Daily.     • ibuprofen (ADVIL,MOTRIN) 800 MG tablet Take  by mouth.     • loratadine (CLARITIN) 10 MG tablet Take 1 tablet by mouth Daily.     • metFORMIN (GLUCOPHAGE) 1000 MG tablet TAKE 1 TABLET BY MOUTH TWICE A DAY 60 tablet 0   • mometasone (ASMANEX TWISTHALER) inhaler 110 mcg/inhalation Inhale 1 puff Daily.       No current facility-administered medications on file prior to visit.        No Known Allergies    Review of Systems   Constitutional: Positive for fever (one day). Negative for activity change, appetite change and fatigue.   HENT: Negative for ear pain, swollen glands and voice change.    Eyes: Negative for visual disturbance.   Respiratory: Positive for cough. Negative for shortness of breath and wheezing.    Cardiovascular: Negative for chest pain and leg swelling.   Gastrointestinal: Negative for abdominal pain, blood in stool, constipation, diarrhea, nausea and vomiting.   Endocrine: Negative for polydipsia and polyuria.   Genitourinary: Negative for dysuria, frequency and hematuria.   Musculoskeletal: Negative for joint swelling, neck pain and neck stiffness.   Skin: Negative for rash and bruise.   Neurological: Negative for weakness, numbness and headache.   Psychiatric/Behavioral: Negative for suicidal ideas and depressed mood.       Objective   Vitals:    01/03/20 0813   BP: 108/72   Pulse: 72   Resp: 18   Temp: 98.1 °F (36.7 °C)   SpO2: 97%     Physical Exam   Constitutional: He is oriented to person, place, and time. He appears well-developed and well-nourished.   Eyes: Pupils are equal, round, and reactive to light. Conjunctivae and EOM are normal.   Neck: Normal range of motion. Neck supple.   Cardiovascular: Normal rate, regular rhythm and normal heart sounds.   Pulmonary/Chest: Effort normal. He has rhonchi.   Abdominal: Soft. Bowel sounds are normal.   Musculoskeletal: Normal range of motion.   Neurological: He is alert and oriented to person, place, and time.   Skin: Skin is  warm and dry.   Psychiatric: He has a normal mood and affect. His behavior is normal. Judgment and thought content normal.         Assessment/Plan .  Problem List Items Addressed This Visit     Reactive airway disease - Primary    Relevant Medications    doxycycline (MONODOX) 100 MG capsule    URI (upper respiratory infection)    Current Assessment & Plan     Given hx and comorbidities, tx empirically          Relevant Medications    doxycycline (MONODOX) 100 MG capsule        Findings discussed. All questions answered.  Medication and medication adverse effects discussed.  Drug education given and explained to patient. Patient verbalized understanding.  Follow-up in 2 weeks if not better.  Follow-up sooner for worsening symptoms or for any concerns.

## 2020-01-09 DIAGNOSIS — E11.43 TYPE 2 DIABETES MELLITUS WITH DIABETIC AUTONOMIC NEUROPATHY, WITHOUT LONG-TERM CURRENT USE OF INSULIN (HCC): ICD-10-CM

## 2020-01-09 NOTE — TELEPHONE ENCOUNTER
Pt is requesting a refill of Metformin 1000mg (90 day supply) sent to [facility]. Pt is out of medication

## 2020-01-20 DIAGNOSIS — E78.2 MIXED HYPERLIPIDEMIA: Primary | ICD-10-CM

## 2020-01-21 RX ORDER — ATORVASTATIN CALCIUM 40 MG/1
TABLET, FILM COATED ORAL
Qty: 30 TABLET | Refills: 2 | Status: SHIPPED | OUTPATIENT
Start: 2020-01-21 | End: 2020-03-30

## 2020-02-17 ENCOUNTER — OFFICE VISIT (OUTPATIENT)
Dept: FAMILY MEDICINE CLINIC | Facility: CLINIC | Age: 65
End: 2020-02-17

## 2020-02-17 VITALS
DIASTOLIC BLOOD PRESSURE: 78 MMHG | TEMPERATURE: 97.6 F | WEIGHT: 249 LBS | OXYGEN SATURATION: 96 % | SYSTOLIC BLOOD PRESSURE: 132 MMHG | HEART RATE: 77 BPM | HEIGHT: 70 IN | RESPIRATION RATE: 18 BRPM | BODY MASS INDEX: 35.65 KG/M2

## 2020-02-17 DIAGNOSIS — J45.909 REACTIVE AIRWAY DISEASE WITHOUT COMPLICATION, UNSPECIFIED ASTHMA SEVERITY, UNSPECIFIED WHETHER PERSISTENT: Primary | ICD-10-CM

## 2020-02-17 DIAGNOSIS — E11.43 TYPE 2 DIABETES MELLITUS WITH DIABETIC AUTONOMIC NEUROPATHY, WITHOUT LONG-TERM CURRENT USE OF INSULIN (HCC): ICD-10-CM

## 2020-02-17 PROCEDURE — 99214 OFFICE O/P EST MOD 30 MIN: CPT | Performed by: FAMILY MEDICINE

## 2020-02-17 RX ORDER — PREDNISONE 20 MG/1
20 TABLET ORAL DAILY
Qty: 20 TABLET | Refills: 0 | Status: SHIPPED | OUTPATIENT
Start: 2020-02-17 | End: 2020-03-01

## 2020-02-17 NOTE — PROGRESS NOTES
Subjective   Jeffrey Aviles JR is a 64 y.o. male.     Went to urgent care in [place] on 2/9/20, dx with flu.     Cough   The current episode started 1 to 4 weeks ago. The problem occurs constantly. The cough is non-productive. Associated symptoms include wheezing. Pertinent negatives include no chest pain, ear pain, fever, rash or shortness of breath.      The following portions of the patient's history were reviewed and updated as appropriate: allergies, current medications, past family history, past medical history, past social history, past surgical history and problem list.    Patient Active Problem List   Diagnosis   • Bilateral hearing loss   • Chronic allergic rhinitis due to pollen   • Displacement of cervical intervertebral disc   • History of asbestos exposure   • Mixed hyperlipidemia   • Overweight   • Reactive airway disease   • Type 2 diabetes mellitus with diabetic autonomic neuropathy, without long-term current use of insulin (CMS/[facility])   • Abnormal chest CT   • Screening PSA (prostate specific antigen)   • Lethargy   • Arthritis   • Paraspinal muscle spasm   • Family history of polyps in the colon   • Annual physical exam   • Gastroenteritis, acute   • URI (upper respiratory infection)   • Myalgia       Current Outpatient Medications on File Prior to Visit   Medication Sig Dispense Refill   • albuterol sulfate HFA (PROAIR HFA) 108 (90 Base) MCG/ACT inhaler Inhale 2 puffs Every 4 (Four) Hours.     • ASMANEX, 30 METERED DOSES, 220 MCG/INH inhaler INHALE 1 PUFF DAILY AT BEDTIME     • aspirin 81 MG tablet Take 81 mg by mouth Daily.     • atorvastatin (LIPITOR) 40 MG tablet TAKE 1 TABLET BY MOUTH EVERY DAY IN THE EVENING 30 tablet 2   • cyclobenzaprine (FLEXERIL) 10 MG tablet Take 1 tablet by mouth every night at bedtime.     • fluticasone (FLONASE) 50 MCG/ACT nasal spray 2 sprays into the nostril(s) as directed by provider Daily.     • ibuprofen (ADVIL,MOTRIN) 800 MG tablet Take  by mouth.     • loratadine  (CLARITIN) 10 MG tablet Take 1 tablet by mouth Daily.     • metFORMIN (GLUCOPHAGE) 1000 MG tablet Take 1 tablet by mouth 2 (Two) Times a Day. 180 tablet 0   • [DISCONTINUED] mometasone (ASMANEX TWISTHALER) inhaler 110 mcg/inhalation Inhale 1 puff Daily.       No current facility-administered medications on file prior to visit.        No Known Allergies    Review of Systems   Constitutional: Negative for activity change, appetite change, fatigue and fever.   HENT: Negative for ear pain, swollen glands and voice change.    Eyes: Negative for visual disturbance.   Respiratory: Positive for cough and wheezing. Negative for shortness of breath.    Cardiovascular: Negative for chest pain and leg swelling.   Gastrointestinal: Negative for abdominal pain, blood in stool, constipation, diarrhea, nausea and vomiting.   Endocrine: Negative for polydipsia and polyuria.   Genitourinary: Negative for dysuria, frequency and hematuria.   Musculoskeletal: Negative for joint swelling, neck pain and neck stiffness.   Skin: Negative for rash and bruise.   Neurological: Negative for weakness, numbness and headache.   Psychiatric/Behavioral: Negative for suicidal ideas and depressed mood.     I have reviewed and confirmed the accuracy of the ROS as documented by the MA/LPN/RN Tristen Duffy MD      Objective   Vitals:    02/17/20 1002   BP: 132/78   Pulse: 77   Resp: 18   Temp: 97.6 °F (36.4 °C)   SpO2: 96%     Physical Exam   Constitutional: He is oriented to person, place, and time. He appears well-developed and well-nourished.   Eyes: Pupils are equal, round, and reactive to light. Conjunctivae and EOM are normal.   Neck: Normal range of motion. Neck supple.   Cardiovascular: Normal rate, regular rhythm and normal heart sounds.   Pulmonary/Chest: Effort normal. He has decreased breath sounds.   Abdominal: Soft. Bowel sounds are normal.   Musculoskeletal: Normal range of motion.   Neurological: He is alert and oriented to  person, place, and time.   Skin: Skin is warm and dry.   Psychiatric: He has a normal mood and affect. His behavior is normal. Judgment and thought content normal.         Assessment/Plan .  Problem List Items Addressed This Visit     Reactive airway disease - Primary    Relevant Medications    predniSONE (DELTASONE) 20 MG tablet    Type 2 diabetes mellitus with diabetic autonomic neuropathy, without long-term current use of insulin (CMS/[facility])      Diabetes complicates medical management of post flu inflammatory airway  Continue albuterol and asmanex.   Consider steroids if sx persist or worsen.   Follow-up in 2 weeks if not better.  Follow-up sooner for worsening symptoms or for any concerns.    Follow-up for routine health maintenance as directed

## 2020-03-02 RX ORDER — ALBUTEROL SULFATE 90 UG/1
AEROSOL, METERED RESPIRATORY (INHALATION)
Qty: 1 INHALER | Refills: 0 | Status: SHIPPED | OUTPATIENT
Start: 2020-03-02 | End: 2020-03-16

## 2020-03-14 DIAGNOSIS — J44.9 ASTHMATIC BRONCHITIS , CHRONIC (HCC): Primary | ICD-10-CM

## 2020-03-16 DIAGNOSIS — E11.43 TYPE 2 DIABETES MELLITUS WITH DIABETIC AUTONOMIC NEUROPATHY, WITHOUT LONG-TERM CURRENT USE OF INSULIN (HCC): ICD-10-CM

## 2020-03-16 RX ORDER — ALBUTEROL SULFATE 90 UG/1
AEROSOL, METERED RESPIRATORY (INHALATION)
Qty: 18 INHALER | Refills: 0 | Status: SHIPPED | OUTPATIENT
Start: 2020-03-16 | End: 2021-01-06

## 2020-03-17 ENCOUNTER — RESULTS ENCOUNTER (OUTPATIENT)
Dept: FAMILY MEDICINE CLINIC | Facility: CLINIC | Age: 65
End: 2020-03-17

## 2020-03-17 DIAGNOSIS — E78.2 MIXED HYPERLIPIDEMIA: ICD-10-CM

## 2020-03-17 DIAGNOSIS — E11.43 TYPE 2 DIABETES MELLITUS WITH DIABETIC AUTONOMIC NEUROPATHY, WITHOUT LONG-TERM CURRENT USE OF INSULIN (HCC): ICD-10-CM

## 2020-03-27 DIAGNOSIS — E78.2 MIXED HYPERLIPIDEMIA: ICD-10-CM

## 2020-03-30 RX ORDER — ATORVASTATIN CALCIUM 40 MG/1
TABLET, FILM COATED ORAL
Qty: 90 TABLET | Refills: 0 | Status: SHIPPED | OUTPATIENT
Start: 2020-03-30 | End: 2020-06-25

## 2020-05-22 LAB
ALBUMIN SERPL-MCNC: 4.4 G/DL (ref 3.8–4.8)
ALBUMIN/GLOB SERPL: 1.6 {RATIO} (ref 1.2–2.2)
ALP SERPL-CCNC: 66 IU/L (ref 39–117)
ALT SERPL-CCNC: 20 IU/L (ref 0–44)
AST SERPL-CCNC: 17 IU/L (ref 0–40)
BILIRUB SERPL-MCNC: 0.5 MG/DL (ref 0–1.2)
BUN SERPL-MCNC: 22 MG/DL (ref 8–27)
BUN/CREAT SERPL: 29 (ref 10–24)
CALCIUM SERPL-MCNC: 9 MG/DL (ref 8.6–10.2)
CHLORIDE SERPL-SCNC: 102 MMOL/L (ref 96–106)
CHOLEST SERPL-MCNC: 119 MG/DL (ref 100–199)
CHOLEST/HDLC SERPL: 3.6 RATIO (ref 0–5)
CO2 SERPL-SCNC: 24 MMOL/L (ref 20–29)
CREAT SERPL-MCNC: 0.76 MG/DL (ref 0.76–1.27)
GLOBULIN SER CALC-MCNC: 2.8 G/DL (ref 1.5–4.5)
GLUCOSE SERPL-MCNC: 124 MG/DL (ref 65–99)
HBA1C MFR BLD: 6.6 % (ref 4.8–5.6)
HDLC SERPL-MCNC: 33 MG/DL
LDLC SERPL CALC-MCNC: 54 MG/DL (ref 0–99)
POTASSIUM SERPL-SCNC: 4.8 MMOL/L (ref 3.5–5.2)
PROT SERPL-MCNC: 7.2 G/DL (ref 6–8.5)
SODIUM SERPL-SCNC: 141 MMOL/L (ref 134–144)
TRIGL SERPL-MCNC: 158 MG/DL (ref 0–149)
VLDLC SERPL CALC-MCNC: 32 MG/DL (ref 5–40)

## 2020-05-28 ENCOUNTER — OFFICE VISIT (OUTPATIENT)
Dept: FAMILY MEDICINE CLINIC | Facility: CLINIC | Age: 65
End: 2020-05-28

## 2020-05-28 DIAGNOSIS — E11.43 TYPE 2 DIABETES MELLITUS WITH DIABETIC AUTONOMIC NEUROPATHY, WITHOUT LONG-TERM CURRENT USE OF INSULIN (HCC): ICD-10-CM

## 2020-05-28 DIAGNOSIS — J45.909 REACTIVE AIRWAY DISEASE WITHOUT COMPLICATION, UNSPECIFIED ASTHMA SEVERITY, UNSPECIFIED WHETHER PERSISTENT: ICD-10-CM

## 2020-05-28 DIAGNOSIS — E78.2 MIXED HYPERLIPIDEMIA: Primary | ICD-10-CM

## 2020-05-28 PROCEDURE — 99442 PR PHYS/QHP TELEPHONE EVALUATION 11-20 MIN: CPT | Performed by: FAMILY MEDICINE

## 2020-06-24 DIAGNOSIS — M50.20 DISPLACEMENT OF CERVICAL INTERVERTEBRAL DISC: Primary | ICD-10-CM

## 2020-06-24 RX ORDER — IBUPROFEN 800 MG/1
800 TABLET ORAL EVERY 8 HOURS PRN
Qty: 90 TABLET | Refills: 1 | Status: SHIPPED | OUTPATIENT
Start: 2020-06-24 | End: 2020-08-17

## 2020-06-24 NOTE — TELEPHONE ENCOUNTER
Wife stated that he has been taking  ibuprofen 800 mg from 2018. She wanted to see if we would send in a new script to [facility] in [place]

## 2020-06-25 DIAGNOSIS — E78.2 MIXED HYPERLIPIDEMIA: ICD-10-CM

## 2020-06-25 RX ORDER — ATORVASTATIN CALCIUM 40 MG/1
TABLET, FILM COATED ORAL
Qty: 30 TABLET | Refills: 2 | Status: SHIPPED | OUTPATIENT
Start: 2020-06-25 | End: 2020-09-21

## 2020-06-28 DIAGNOSIS — E11.43 TYPE 2 DIABETES MELLITUS WITH DIABETIC AUTONOMIC NEUROPATHY, WITHOUT LONG-TERM CURRENT USE OF INSULIN (HCC): ICD-10-CM

## 2020-07-17 ENCOUNTER — CLINICAL SUPPORT (OUTPATIENT)
Dept: FAMILY MEDICINE CLINIC | Facility: CLINIC | Age: 65
End: 2020-07-17

## 2020-07-17 VITALS
TEMPERATURE: 98.3 F | RESPIRATION RATE: 18 BRPM | HEART RATE: 72 BPM | WEIGHT: 239 LBS | HEIGHT: 70 IN | OXYGEN SATURATION: 98 % | SYSTOLIC BLOOD PRESSURE: 114 MMHG | DIASTOLIC BLOOD PRESSURE: 72 MMHG | BODY MASS INDEX: 34.22 KG/M2

## 2020-07-17 DIAGNOSIS — Z02.4 ENCOUNTER FOR CDL (COMMERCIAL DRIVING LICENSE) EXAM: Primary | ICD-10-CM

## 2020-07-17 LAB
BILIRUB BLD-MCNC: NEGATIVE MG/DL
CLARITY, POC: CLEAR
COLOR UR: YELLOW
GLUCOSE UR STRIP-MCNC: ABNORMAL MG/DL
KETONES UR QL: NEGATIVE
LEUKOCYTE EST, POC: NEGATIVE
NITRITE UR-MCNC: NEGATIVE MG/ML
PH UR: 6 [PH] (ref 5–8)
PROT UR STRIP-MCNC: NEGATIVE MG/DL
RBC # UR STRIP: ABNORMAL /UL
SP GR UR: 1.02 (ref 1–1.03)
UROBILINOGEN UR QL: NORMAL

## 2020-07-17 PROCEDURE — 81003 URINALYSIS AUTO W/O SCOPE: CPT | Performed by: FAMILY MEDICINE

## 2020-07-17 PROCEDURE — DOTPHY: Performed by: FAMILY MEDICINE

## 2020-07-17 NOTE — PROGRESS NOTES
" Medical Examination    Subjective   Jeffrey Aviles JR is a 64 y.o. male who presents today for a  fitness determination physical exam. The patient reports no problems.  The following portions of the patient's history were reviewed and updated as appropriate: allergies, current medications, past family history, past medical history, past social history, past surgical history and problem list.  Review of Systems  A comprehensive review of systems was negative.    Objective    Vision:   Visual Acuity Screening    Right eye Left eye Both eyes   Without correction:      With correction: 20/20 20/20 20/20       Applicant can recognize and distinguish among traffic control signals and devices showing standard red, green, and miryam colors.  Applicant has peripheral vision to 90 degrees in each eye.    Applicant meets visual acuity requirement only when wearing corrective lenses.    Monocular Vision?: No    Hearing:  Applicant can distinguish forced whisper at a distance of 5 feet with both ears.    /72   Pulse 72   Temp 98.3 °F (36.8 °C)   Resp 18   Ht 177.8 cm (70\")   Wt 108 kg (239 lb)   SpO2 98%   BMI 34.29 kg/m²     General Appearance:    Alert, cooperative, no distress, appears stated age   Head:    Normocephalic, without obvious abnormality, atraumatic   Eyes:    PERRL, conjunctiva/corneas clear, EOM's intact, fundi     benign, both eyes        Ears:    Normal TM's and external ear canals, both ears   Nose:   Nares normal, septum midline, mucosa normal, no drainage    or sinus tenderness   Throat:   Lips, mucosa, and tongue normal; teeth and gums normal   Neck:   Supple, symmetrical, trachea midline, no adenopathy;        thyroid:  No enlargement/tenderness/nodules; no carotid    bruit or JVD   Back:     Symmetric, no curvature, ROM normal, no CVA tenderness   Lungs:     Clear to auscultation bilaterally, respirations unlabored   Chest wall:    No tenderness or deformity "   Heart:    Regular rate and rhythm, S1 and S2 normal, no murmur, rub   or gallop   Abdomen:     Soft, non-tender, bowel sounds active all four quadrants,     no masses, no organomegaly   Genitalia:    Normal male without lesion, discharge or tenderness   Rectal:    Normal tone, normal prostate, no masses or tenderness;    guaiac negative stool   Extremities:   Extremities normal, atraumatic, no cyanosis or edema   Pulses:   2+ and symmetric all extremities   Skin:   Skin color, texture, turgor normal, no rashes or lesions   Lymph nodes:   Cervical, supraclavicular, and axillary nodes normal   Neurologic:   CNII-XII intact. Normal strength, sensation and reflexes       throughout       Labs:  Lab Results   Component Value Date    SPECGRAV 1.020 11/20/2019    BILIRUBINUR Negative 11/20/2019    GLUCOSEU 121 (H) 05/30/2019       Assessment/Plan   Healthy male exam.   Meets standards in 49 .41;  qualifies for 2 year certificate.     Medical examiners certificate completed and printed.  Return as needed.

## 2020-08-17 DIAGNOSIS — M50.20 DISPLACEMENT OF CERVICAL INTERVERTEBRAL DISC: ICD-10-CM

## 2020-08-17 RX ORDER — IBUPROFEN 800 MG/1
800 TABLET ORAL EVERY 8 HOURS PRN
Qty: 90 TABLET | Refills: 0 | Status: SHIPPED | OUTPATIENT
Start: 2020-08-17 | End: 2021-04-14 | Stop reason: SDUPTHER

## 2020-09-19 DIAGNOSIS — E78.2 MIXED HYPERLIPIDEMIA: ICD-10-CM

## 2020-09-21 RX ORDER — ATORVASTATIN CALCIUM 40 MG/1
TABLET, FILM COATED ORAL
Qty: 30 TABLET | Refills: 0 | Status: SHIPPED | OUTPATIENT
Start: 2020-09-21 | End: 2020-10-15

## 2020-09-24 DIAGNOSIS — E11.43 TYPE 2 DIABETES MELLITUS WITH DIABETIC AUTONOMIC NEUROPATHY, WITHOUT LONG-TERM CURRENT USE OF INSULIN (HCC): ICD-10-CM

## 2020-10-12 DIAGNOSIS — E11.43 TYPE 2 DIABETES MELLITUS WITH DIABETIC AUTONOMIC NEUROPATHY, WITHOUT LONG-TERM CURRENT USE OF INSULIN (HCC): ICD-10-CM

## 2020-10-14 DIAGNOSIS — E78.2 MIXED HYPERLIPIDEMIA: ICD-10-CM

## 2020-10-15 RX ORDER — ATORVASTATIN CALCIUM 40 MG/1
TABLET, FILM COATED ORAL
Qty: 30 TABLET | Refills: 0 | Status: SHIPPED | OUTPATIENT
Start: 2020-10-15 | End: 2020-11-09

## 2020-11-08 DIAGNOSIS — E78.2 MIXED HYPERLIPIDEMIA: ICD-10-CM

## 2020-11-10 RX ORDER — ATORVASTATIN CALCIUM 40 MG/1
TABLET, FILM COATED ORAL
Qty: 25 TABLET | Refills: 0 | Status: SHIPPED | OUTPATIENT
Start: 2020-11-10 | End: 2020-12-23

## 2020-12-08 ENCOUNTER — TELEPHONE (OUTPATIENT)
Dept: FAMILY MEDICINE CLINIC | Facility: CLINIC | Age: 65
End: 2020-12-08

## 2020-12-08 DIAGNOSIS — E11.43 TYPE 2 DIABETES MELLITUS WITH DIABETIC AUTONOMIC NEUROPATHY, WITHOUT LONG-TERM CURRENT USE OF INSULIN (HCC): ICD-10-CM

## 2020-12-08 DIAGNOSIS — E78.2 MIXED HYPERLIPIDEMIA: Primary | ICD-10-CM

## 2020-12-08 NOTE — TELEPHONE ENCOUNTER
Sachin called and said that she left a message yesterday re: Jeffrey's atoravastatin. He only got a 28 day supply and she wants to know why. Please contact her at . Thanks Divine

## 2020-12-11 LAB
ALBUMIN SERPL-MCNC: 4.2 G/DL (ref 3.8–4.8)
ALBUMIN/GLOB SERPL: 1.7 {RATIO} (ref 1.2–2.2)
ALP SERPL-CCNC: 65 IU/L (ref 39–117)
ALT SERPL-CCNC: 23 IU/L (ref 0–44)
AST SERPL-CCNC: 18 IU/L (ref 0–40)
BILIRUB SERPL-MCNC: 0.6 MG/DL (ref 0–1.2)
BUN SERPL-MCNC: 17 MG/DL (ref 8–27)
BUN/CREAT SERPL: 22 (ref 10–24)
CALCIUM SERPL-MCNC: 8.6 MG/DL (ref 8.6–10.2)
CHLORIDE SERPL-SCNC: 105 MMOL/L (ref 96–106)
CHOLEST SERPL-MCNC: 101 MG/DL (ref 100–199)
CHOLEST/HDLC SERPL: 3.1 RATIO (ref 0–5)
CO2 SERPL-SCNC: 25 MMOL/L (ref 20–29)
CREAT SERPL-MCNC: 0.76 MG/DL (ref 0.76–1.27)
GLOBULIN SER CALC-MCNC: 2.5 G/DL (ref 1.5–4.5)
GLUCOSE SERPL-MCNC: 121 MG/DL (ref 65–99)
HBA1C MFR BLD: 7 % (ref 4.8–5.6)
HDLC SERPL-MCNC: 33 MG/DL
LDLC SERPL CALC-MCNC: 49 MG/DL (ref 0–99)
POTASSIUM SERPL-SCNC: 5.1 MMOL/L (ref 3.5–5.2)
PROT SERPL-MCNC: 6.7 G/DL (ref 6–8.5)
SODIUM SERPL-SCNC: 140 MMOL/L (ref 134–144)
TRIGL SERPL-MCNC: 99 MG/DL (ref 0–149)
VLDLC SERPL CALC-MCNC: 19 MG/DL (ref 5–40)

## 2020-12-23 DIAGNOSIS — E78.2 MIXED HYPERLIPIDEMIA: ICD-10-CM

## 2020-12-23 RX ORDER — ATORVASTATIN CALCIUM 40 MG/1
TABLET, FILM COATED ORAL
Qty: 30 TABLET | Refills: 0 | Status: SHIPPED | OUTPATIENT
Start: 2020-12-23 | End: 2021-01-25

## 2020-12-24 DIAGNOSIS — E11.43 TYPE 2 DIABETES MELLITUS WITH DIABETIC AUTONOMIC NEUROPATHY, WITHOUT LONG-TERM CURRENT USE OF INSULIN (HCC): ICD-10-CM

## 2021-01-06 ENCOUNTER — OFFICE VISIT (OUTPATIENT)
Dept: FAMILY MEDICINE CLINIC | Facility: CLINIC | Age: 66
End: 2021-01-06

## 2021-01-06 VITALS
DIASTOLIC BLOOD PRESSURE: 78 MMHG | WEIGHT: 246.4 LBS | HEART RATE: 87 BPM | OXYGEN SATURATION: 99 % | HEIGHT: 70 IN | BODY MASS INDEX: 35.28 KG/M2 | SYSTOLIC BLOOD PRESSURE: 127 MMHG | RESPIRATION RATE: 16 BRPM | TEMPERATURE: 96.8 F

## 2021-01-06 DIAGNOSIS — E78.2 MIXED HYPERLIPIDEMIA: Primary | ICD-10-CM

## 2021-01-06 DIAGNOSIS — J44.9 ASTHMATIC BRONCHITIS , CHRONIC (HCC): ICD-10-CM

## 2021-01-06 DIAGNOSIS — E11.43 TYPE 2 DIABETES MELLITUS WITH DIABETIC AUTONOMIC NEUROPATHY, WITHOUT LONG-TERM CURRENT USE OF INSULIN (HCC): ICD-10-CM

## 2021-01-06 PROCEDURE — 99214 OFFICE O/P EST MOD 30 MIN: CPT | Performed by: FAMILY MEDICINE

## 2021-01-06 RX ORDER — ALBUTEROL SULFATE 90 UG/1
AEROSOL, METERED RESPIRATORY (INHALATION)
Qty: 18 G | Refills: 3 | Status: SHIPPED | OUTPATIENT
Start: 2021-01-06 | End: 2021-04-14 | Stop reason: SDUPTHER

## 2021-01-06 NOTE — PROGRESS NOTES
Subjective   Jeffrey Aviles JR is a 65 y.o. male.     Hyperlipidemia  This is a chronic problem. The current episode started more than 1 year ago. The problem is controlled. Exacerbating diseases include diabetes. Pertinent negatives include no myalgias. Current antihyperlipidemic treatment includes statins.   Diabetes  He presents for his follow-up diabetic visit. He has type 2 diabetes mellitus. His disease course has been stable. There are no hypoglycemic associated symptoms. Pertinent negatives for diabetes include no polyphagia, no polyuria and no weight loss. There are no hypoglycemic complications. There are no diabetic complications. Risk factors for coronary artery disease include dyslipidemia and diabetes mellitus.        The following portions of the patient's history were reviewed and updated as appropriate: current medications, past family history, past medical history, past social history, past surgical history and problem list.    Family History   Problem Relation Age of Onset   • Cerebral aneurysm Mother          at 42   • Hypertension Mother    • Hyperlipidemia Father    • Hypertension Father    • Other Father         spinal stenosis   • Asthma Father         living at 86   • Prostate cancer Father    • Breast cancer Sister    • Hyperlipidemia Sister    • Prostate cancer Maternal Grandfather    • Diabetes Maternal Uncle    • Heart disease Maternal Uncle    • Lung cancer Maternal Uncle    • Hypertension Brother    • Hyperlipidemia Brother    • Other Brother 30        Hepatitis   • Stroke Maternal Grandmother        Social History     Tobacco Use   • Smoking status: Never Smoker   • Smokeless tobacco: Never Used   Substance Use Topics   • Alcohol use: No     Frequency: Never   • Drug use: No       Past Surgical History:   Procedure Laterality Date   • PAROTID DUCT LIGATION         Patient Active Problem List   Diagnosis   • Bilateral hearing loss   • Chronic allergic rhinitis due to pollen   •  Displacement of cervical intervertebral disc   • History of asbestos exposure   • Mixed hyperlipidemia   • Overweight   • Reactive airway disease   • Type 2 diabetes mellitus with diabetic autonomic neuropathy, without long-term current use of insulin (CMS/[facility])   • Abnormal chest CT   • Screening PSA (prostate specific antigen)   • Lethargy   • Arthritis   • Paraspinal muscle spasm   • Family history of polyps in the colon   • Annual physical exam   • Myalgia       Current Outpatient Medications on File Prior to Visit   Medication Sig Dispense Refill   • ASMANEX, 30 METERED DOSES, 220 MCG/INH inhaler INHALE 1 PUFF DAILY AT BEDTIME     • aspirin 81 MG tablet Take 81 mg by mouth Daily.     • atorvastatin (LIPITOR) 40 MG tablet TAKE 1 TABLET BY MOUTH EVERY DAY IN THE EVENING 30 tablet 0   • cyclobenzaprine (FLEXERIL) 10 MG tablet Take 1 tablet by mouth every night at bedtime.     • fluticasone (FLONASE) 50 MCG/ACT nasal spray 2 sprays into the nostril(s) as directed by provider Daily.     • ibuprofen (ADVIL,MOTRIN) 800 MG tablet TAKE 1 TABLET BY MOUTH EVERY 8 (EIGHT) HOURS AS NEEDED FOR MILD PAIN 90 tablet 0   • loratadine (CLARITIN) 10 MG tablet Take 1 tablet by mouth Daily.     • metFORMIN (GLUCOPHAGE) 1000 MG tablet Take 1 tablet by mouth 2 (Two) Times a Day. 60 tablet 0     No current facility-administered medications on file prior to visit.        No Known Allergies    Review of Systems   Constitutional: Negative for unexpected weight gain and unexpected weight loss.   Eyes: Negative for visual disturbance.   Gastrointestinal: Negative for nausea.   Endocrine: Negative for polyphagia and polyuria.   Genitourinary: Negative for frequency.   Musculoskeletal: Negative for myalgias.   Skin: Negative for dry skin and skin lesions.   Neurological: Negative for syncope and numbness.       Objective   Visit Vitals  /78 (BP Location: Left arm, Patient Position: Sitting, Cuff Size: Large Adult)   Pulse 87   Temp 96.8  "°F (36 °C)   Resp 16   Ht 177.8 cm (70\")   Wt 112 kg (246 lb 6.4 oz)   SpO2 99%   BMI 35.35 kg/m²     Physical Exam  Vitals signs and nursing note reviewed.   Constitutional:       Appearance: He is well-developed.   HENT:      Head: Normocephalic.   Neck:      Musculoskeletal: Neck supple.      Thyroid: No thyromegaly.      Vascular: No carotid bruit.      Trachea: Trachea normal.   Cardiovascular:      Rate and Rhythm: Normal rate and regular rhythm.      Heart sounds: No murmur. No friction rub. No gallop.    Pulmonary:      Effort: Pulmonary effort is normal. No respiratory distress.      Breath sounds: Normal breath sounds. No wheezing.   Chest:      Chest wall: No tenderness.   Skin:     General: Skin is dry.      Findings: No rash.      Nails: There is no clubbing.     Neurological:      Mental Status: He is alert and oriented to person, place, and time.   Psychiatric:         Behavior: Behavior is cooperative.           Assessment/Plan .  Problem List Items Addressed This Visit        Medium    Mixed hyperlipidemia - Primary    Current Assessment & Plan     Improved-but not at goal.  Encouraged to watch fatty intake, exercise more, and lose weight.   compliant with medication--tolerates Lipitor quite well without side effects benefits of the drug outweigh the risk  Is not getting adequate diet and exercise  Goals developed at last visit were not met   Follow up in 3  months  Care management needs are self-addressed.Self-management abilities addressed and patient is capable of managing his own disease.           Type 2 diabetes mellitus with diabetic autonomic neuropathy, without long-term current use of insulin (CMS/[facility])    Current Assessment & Plan     Worsening. Hgb a1c 7.0 up from 6.7  Encouraged to watch sugar intake, exercise more and lose weight.   compliant with medication.  He tolerates Metformin quite well without side effects benefits of the drug outweigh the risk  Not monitoring sugar at home. "   Follow up in 3 months  Care management needs are self-addressed. Self-management abilities addressed and patient is capable of managing his own disease.

## 2021-01-06 NOTE — ASSESSMENT & PLAN NOTE
Worsening. Hgb a1c 7.0 up from 6.7  Encouraged to watch sugar intake, exercise more and lose weight.   compliant with medication.  He tolerates Metformin quite well without side effects benefits of the drug outweigh the risk  Not monitoring sugar at home.   Follow up in 3 months  Care management needs are self-addressed. Self-management abilities addressed and patient is capable of managing his own disease.

## 2021-01-06 NOTE — ASSESSMENT & PLAN NOTE
Improved-but not at goal.  Encouraged to watch fatty intake, exercise more, and lose weight.   compliant with medication--tolerates Lipitor quite well without side effects benefits of the drug outweigh the risk  Is not getting adequate diet and exercise  Goals developed at last visit were not met   Follow up in 3  months  Care management needs are self-addressed.Self-management abilities addressed and patient is capable of managing his own disease.

## 2021-01-15 DIAGNOSIS — E11.43 TYPE 2 DIABETES MELLITUS WITH DIABETIC AUTONOMIC NEUROPATHY, WITHOUT LONG-TERM CURRENT USE OF INSULIN (HCC): ICD-10-CM

## 2021-01-18 DIAGNOSIS — M54.50 MIDLINE LOW BACK PAIN WITHOUT SCIATICA, UNSPECIFIED CHRONICITY: Primary | ICD-10-CM

## 2021-01-18 RX ORDER — CYCLOBENZAPRINE HCL 10 MG
10 TABLET ORAL
Qty: 30 TABLET | Refills: 1 | Status: SHIPPED | OUTPATIENT
Start: 2021-01-18 | End: 2021-04-14 | Stop reason: SDUPTHER

## 2021-01-24 DIAGNOSIS — E78.2 MIXED HYPERLIPIDEMIA: ICD-10-CM

## 2021-01-25 RX ORDER — ATORVASTATIN CALCIUM 40 MG/1
TABLET, FILM COATED ORAL
Qty: 30 TABLET | Refills: 0 | Status: SHIPPED | OUTPATIENT
Start: 2021-01-25 | End: 2021-02-24

## 2021-02-24 DIAGNOSIS — E78.2 MIXED HYPERLIPIDEMIA: ICD-10-CM

## 2021-02-24 RX ORDER — ATORVASTATIN CALCIUM 40 MG/1
TABLET, FILM COATED ORAL
Qty: 30 TABLET | Refills: 2 | Status: SHIPPED | OUTPATIENT
Start: 2021-02-24 | End: 2021-04-14

## 2021-04-07 DIAGNOSIS — E11.43 TYPE 2 DIABETES MELLITUS WITH DIABETIC AUTONOMIC NEUROPATHY, WITHOUT LONG-TERM CURRENT USE OF INSULIN (HCC): ICD-10-CM

## 2021-04-07 DIAGNOSIS — E78.2 MIXED HYPERLIPIDEMIA: Primary | ICD-10-CM

## 2021-04-08 LAB
ALBUMIN SERPL-MCNC: 4.6 G/DL (ref 3.8–4.8)
ALBUMIN/GLOB SERPL: 1.8 {RATIO} (ref 1.2–2.2)
ALP SERPL-CCNC: 63 IU/L (ref 39–117)
ALT SERPL-CCNC: 24 IU/L (ref 0–44)
AST SERPL-CCNC: 18 IU/L (ref 0–40)
BILIRUB SERPL-MCNC: 0.8 MG/DL (ref 0–1.2)
BUN SERPL-MCNC: 19 MG/DL (ref 8–27)
BUN/CREAT SERPL: 25 (ref 10–24)
CALCIUM SERPL-MCNC: 8.9 MG/DL (ref 8.6–10.2)
CHLORIDE SERPL-SCNC: 105 MMOL/L (ref 96–106)
CHOLEST SERPL-MCNC: 95 MG/DL (ref 100–199)
CHOLEST/HDLC SERPL: 2.7 RATIO (ref 0–5)
CO2 SERPL-SCNC: 25 MMOL/L (ref 20–29)
CREAT SERPL-MCNC: 0.76 MG/DL (ref 0.76–1.27)
GLOBULIN SER CALC-MCNC: 2.5 G/DL (ref 1.5–4.5)
GLUCOSE SERPL-MCNC: 113 MG/DL (ref 65–99)
HBA1C MFR BLD: 6.3 % (ref 4.8–5.6)
HDLC SERPL-MCNC: 35 MG/DL
LDLC SERPL CALC-MCNC: 41 MG/DL (ref 0–99)
POTASSIUM SERPL-SCNC: 4.9 MMOL/L (ref 3.5–5.2)
PROT SERPL-MCNC: 7.1 G/DL (ref 6–8.5)
SODIUM SERPL-SCNC: 142 MMOL/L (ref 134–144)
TRIGL SERPL-MCNC: 100 MG/DL (ref 0–149)
VLDLC SERPL CALC-MCNC: 19 MG/DL (ref 5–40)

## 2021-04-09 ENCOUNTER — TELEPHONE (OUTPATIENT)
Dept: FAMILY MEDICINE CLINIC | Facility: CLINIC | Age: 66
End: 2021-04-09

## 2021-04-09 NOTE — TELEPHONE ENCOUNTER
THE PATIENTS SPOUSE CALLED IN WANTING TO KNOW IF HE CAN HAVE BOTH APPOINTMENTS FOR NEXT WEEK MERGED INTO ONE APPT?    SHE WOULD ALSO LIKE TO SPEAK WITH THE NURSE REGARDING THE TYPES OF APPOINTMENTS THE PATIENT HAD LAST YEAR.    BEST CALL BACK # [phone]

## 2021-04-12 ENCOUNTER — OFFICE VISIT (OUTPATIENT)
Dept: FAMILY MEDICINE CLINIC | Facility: CLINIC | Age: 66
End: 2021-04-12

## 2021-04-12 VITALS
DIASTOLIC BLOOD PRESSURE: 80 MMHG | BODY MASS INDEX: 32.84 KG/M2 | SYSTOLIC BLOOD PRESSURE: 133 MMHG | HEIGHT: 70 IN | OXYGEN SATURATION: 97 % | WEIGHT: 229.4 LBS | RESPIRATION RATE: 18 BRPM | HEART RATE: 61 BPM | TEMPERATURE: 96.8 F

## 2021-04-12 DIAGNOSIS — E78.2 MIXED HYPERLIPIDEMIA: ICD-10-CM

## 2021-04-12 DIAGNOSIS — E11.43 TYPE 2 DIABETES MELLITUS WITH DIABETIC AUTONOMIC NEUROPATHY, WITHOUT LONG-TERM CURRENT USE OF INSULIN (HCC): Primary | ICD-10-CM

## 2021-04-12 DIAGNOSIS — Z12.5 SCREENING PSA (PROSTATE SPECIFIC ANTIGEN): ICD-10-CM

## 2021-04-12 DIAGNOSIS — H83.3X3 NOISE-INDUCED HEARING LOSS OF BOTH EARS: ICD-10-CM

## 2021-04-12 DIAGNOSIS — R35.1 NOCTURIA: ICD-10-CM

## 2021-04-12 PROCEDURE — 99213 OFFICE O/P EST LOW 20 MIN: CPT | Performed by: FAMILY MEDICINE

## 2021-04-12 NOTE — ASSESSMENT & PLAN NOTE
Microalbumin order given to patient.  Monofilament foot exam was done and was WNL. No ulcers seen on the feet. Eye exam up to date by [facility] .  Patient encouraged to check blood sugar daily. Encourged to watch sugar intake, exercise more, lose weight.  He is on Metformin a gram twice a day tolerates it well with no side effects.  I feel benefits of the drug outweigh the risk.  Labs drawn today and will discuss results at next visit.

## 2021-04-12 NOTE — PROGRESS NOTES
Subjective   Jeffrey Aviles JR is a 65 y.o. male.   Chief Complaint   Patient presents with   • Diabetes   • Hearing Problem     Diabetes  He presents for his follow-up diabetic visit. He has type 2 diabetes mellitus. His disease course has been improving. There are no hypoglycemic associated symptoms. There are no diabetic associated symptoms. Pertinent negatives for diabetes include no chest pain and no fatigue. There are no hypoglycemic complications. There are no diabetic complications. Risk factors for coronary artery disease include dyslipidemia and diabetes mellitus. Current diabetic treatment includes oral agent (monotherapy). He is compliant with treatment all of the time. He is following a diabetic diet. He has not had a previous visit with a dietitian. He participates in exercise daily. There is no change in his home blood glucose trend. He does not see a podiatrist.Eye exam is current.   Hearing Problem  This is a chronic problem. The current episode started more than 1 year ago. The problem occurs constantly. The problem has been unchanged. Pertinent negatives include no chest pain, fatigue or joint swelling. Nothing aggravates the symptoms. He has tried nothing for the symptoms. The treatment provided no relief.        The following portions of the patient's history were reviewed and updated as appropriate: allergies, current medications, past family history, past medical history, past social history, past surgical history and problem list.    Past Medical History:   Diagnosis Date   • Abnormal chest CT    • Bilateral hearing loss    • History of asbestos exposure    • Mixed hyperlipidemia        Past Surgical History:   Procedure Laterality Date   • COLONOSCOPY  2016    Repeat    • PAROTID DUCT LIGATION          Family History   Problem Relation Age of Onset   • Cerebral aneurysm Mother          at 42   • Hypertension Mother    • Hyperlipidemia Father    • Hypertension Father    • Other  "Father         spinal stenosis   • Asthma Father         living at 86   • Prostate cancer Father    • Cancer Father         Prostate cancer  at 87   • Breast cancer Sister    • Hyperlipidemia Sister    • Prostate cancer Maternal Grandfather    • Diabetes Maternal Uncle    • Heart disease Maternal Uncle    • Lung cancer Maternal Uncle    • Hypertension Brother    • Hyperlipidemia Brother    • Other Brother 30        Hepatitis   • Stroke Maternal Grandmother         Social History     Socioeconomic History   • Marital status:      Spouse name: Not on file   • Number of children: Not on file   • Years of education: Not on file   • Highest education level: Not on file   Tobacco Use   • Smoking status: Never Smoker   • Smokeless tobacco: Never Used   Substance and Sexual Activity   • Alcohol use: No   • Drug use: No         Review of Systems   Constitutional: Negative for fatigue.   HENT: Positive for hearing loss (moderate).    Respiratory: Negative for shortness of breath.    Cardiovascular: Negative for chest pain and palpitations.   Genitourinary: Positive for nocturia (3x weekly). Negative for frequency.        Nocturia   Musculoskeletal: Positive for back pain (mild). Negative for joint swelling.   Neurological: Negative for memory problem.       Objective   Visit Vitals  /80 (BP Location: Right arm, Patient Position: Sitting, Cuff Size: Large Adult)   Pulse 61   Temp 96.8 °F (36 °C) (Temporal)   Resp 18   Ht 177.8 cm (70\")   Wt 104 kg (229 lb 6.4 oz)   SpO2 97%   BMI 32.92 kg/m²     Physical Exam  Vitals and nursing note reviewed.   Constitutional:       Appearance: He is well-developed.   HENT:      Head: Normocephalic.      Right Ear: Tympanic membrane, ear canal and external ear normal. No middle ear effusion. There is no impacted cerumen.      Left Ear: Tympanic membrane, ear canal and external ear normal.  No middle ear effusion. There is no impacted cerumen.      Nose: No septal " deviation, mucosal edema or congestion.      Right Sinus: No maxillary sinus tenderness or frontal sinus tenderness.      Left Sinus: No maxillary sinus tenderness or frontal sinus tenderness.      Mouth/Throat:      Mouth: No oral lesions.      Pharynx: No oropharyngeal exudate.      Tonsils: No tonsillar abscesses.   Neck:      Thyroid: No thyromegaly.      Vascular: No carotid bruit.      Trachea: Trachea normal.   Cardiovascular:      Rate and Rhythm: Normal rate and regular rhythm.      Heart sounds: No murmur heard.   No friction rub. No gallop.    Pulmonary:      Effort: Pulmonary effort is normal. No respiratory distress.      Breath sounds: Normal breath sounds. No wheezing.   Chest:      Chest wall: No tenderness.   Musculoskeletal:      Cervical back: Neck supple.   Feet:      Right foot:      Protective Sensation: 10 sites tested. 10 sites sensed.      Skin integrity: Skin integrity normal.      Left foot:      Protective Sensation: 10 sites tested. 10 sites sensed.      Skin integrity: Skin integrity normal.   Skin:     General: Skin is dry.      Findings: No rash.      Nails: There is no clubbing.   Neurological:      Mental Status: He is alert and oriented to person, place, and time.   Psychiatric:         Behavior: Behavior is cooperative.         Assessment/Plan   Problem List Items Addressed This Visit        Medium    Mixed hyperlipidemia    Current Assessment & Plan     Patient given order for fasting labs.  For lipid and CMP         Type 2 diabetes mellitus with diabetic autonomic neuropathy, without long-term current use of insulin (CMS/[facility]) - Primary    Current Assessment & Plan       Microalbumin order given to patient.  Monofilament foot exam was done and was WNL. No ulcers seen on the feet. Eye exam up to date by [facility] .  Patient encouraged to check blood sugar daily. Encourged to watch sugar intake, exercise more, lose weight.  He is on Metformin a gram twice a day  tolerates it well with no side effects.  I feel benefits of the drug outweigh the risk.  Labs drawn today and will discuss results at next visit.         Relevant Orders    Microalbumin / Creatinine Urine Ratio - Urine, Clean Catch (Completed)       Unprioritized    Bilateral hearing loss    Current Assessment & Plan     Advised to wear hearing protection and avoid noise exposure.         Screening PSA (prostate specific antigen)    Current Assessment & Plan     Patient given order for fasting labs.         Relevant Orders    PSA Screen (Completed)      Other Visit Diagnoses     Nocturia        Relevant Orders    Urinalysis without microscopic (no culture) - Urine, Clean Catch (Completed)

## 2021-04-13 LAB
ALBUMIN/CREAT UR: 6 MG/G CREAT (ref 0–29)
APPEARANCE UR: CLEAR
BILIRUB UR QL STRIP: NEGATIVE
COLOR UR: YELLOW
CREAT UR-MCNC: 74 MG/DL
GLUCOSE UR QL: NEGATIVE
HGB UR QL STRIP: NEGATIVE
KETONES UR QL STRIP: NEGATIVE
LEUKOCYTE ESTERASE UR QL STRIP: NEGATIVE
MICROALBUMIN UR-MCNC: 4.6 UG/ML
NITRITE UR QL STRIP: NEGATIVE
PH UR STRIP: 5.5 [PH] (ref 5–7.5)
PROT UR QL STRIP: NEGATIVE
PSA SERPL-MCNC: 0.7 NG/ML (ref 0–4)
SP GR UR: 1.01 (ref 1–1.03)
UROBILINOGEN UR STRIP-MCNC: 0.2 MG/DL (ref 0.2–1)

## 2021-04-14 ENCOUNTER — OFFICE VISIT (OUTPATIENT)
Dept: FAMILY MEDICINE CLINIC | Facility: CLINIC | Age: 66
End: 2021-04-14

## 2021-04-14 VITALS
OXYGEN SATURATION: 98 % | DIASTOLIC BLOOD PRESSURE: 76 MMHG | WEIGHT: 229.4 LBS | RESPIRATION RATE: 18 BRPM | HEART RATE: 56 BPM | HEIGHT: 70 IN | TEMPERATURE: 96.9 F | BODY MASS INDEX: 32.84 KG/M2 | SYSTOLIC BLOOD PRESSURE: 130 MMHG

## 2021-04-14 DIAGNOSIS — M50.20 DISPLACEMENT OF CERVICAL INTERVERTEBRAL DISC: ICD-10-CM

## 2021-04-14 DIAGNOSIS — Z12.5 SCREENING PSA (PROSTATE SPECIFIC ANTIGEN): ICD-10-CM

## 2021-04-14 DIAGNOSIS — E78.2 MIXED HYPERLIPIDEMIA: Primary | ICD-10-CM

## 2021-04-14 DIAGNOSIS — M79.10 MYALGIA: ICD-10-CM

## 2021-04-14 DIAGNOSIS — J44.9 ASTHMATIC BRONCHITIS , CHRONIC (HCC): ICD-10-CM

## 2021-04-14 DIAGNOSIS — J45.20 MILD INTERMITTENT ASTHMA WITHOUT COMPLICATION: ICD-10-CM

## 2021-04-14 DIAGNOSIS — M12.9 ARTHRITIS, MULTIPLE JOINT INVOLVEMENT: ICD-10-CM

## 2021-04-14 DIAGNOSIS — E66.3 OVERWEIGHT: ICD-10-CM

## 2021-04-14 DIAGNOSIS — M54.50 MIDLINE LOW BACK PAIN WITHOUT SCIATICA, UNSPECIFIED CHRONICITY: ICD-10-CM

## 2021-04-14 DIAGNOSIS — J30.1 CHRONIC ALLERGIC RHINITIS DUE TO POLLEN: ICD-10-CM

## 2021-04-14 DIAGNOSIS — Z83.71 FAMILY HISTORY OF POLYPS IN THE COLON: ICD-10-CM

## 2021-04-14 DIAGNOSIS — Z00.01 ENCOUNTER FOR GENERAL ADULT MEDICAL EXAMINATION WITH ABNORMAL FINDINGS: ICD-10-CM

## 2021-04-14 DIAGNOSIS — Z77.090 HISTORY OF ASBESTOS EXPOSURE: ICD-10-CM

## 2021-04-14 DIAGNOSIS — E11.43 TYPE 2 DIABETES MELLITUS WITH DIABETIC AUTONOMIC NEUROPATHY, WITHOUT LONG-TERM CURRENT USE OF INSULIN (HCC): ICD-10-CM

## 2021-04-14 DIAGNOSIS — H83.3X3 NOISE-INDUCED HEARING LOSS OF BOTH EARS: ICD-10-CM

## 2021-04-14 DIAGNOSIS — R93.89 ABNORMAL CHEST CT: ICD-10-CM

## 2021-04-14 DIAGNOSIS — M62.830 PARASPINAL MUSCLE SPASM: ICD-10-CM

## 2021-04-14 PROCEDURE — 99396 PREV VISIT EST AGE 40-64: CPT | Performed by: FAMILY MEDICINE

## 2021-04-14 PROCEDURE — 99214 OFFICE O/P EST MOD 30 MIN: CPT | Performed by: FAMILY MEDICINE

## 2021-04-14 RX ORDER — ATORVASTATIN CALCIUM 20 MG/1
40 TABLET, FILM COATED ORAL EVERY EVENING
Qty: 30 TABLET | Refills: 5 | Status: SHIPPED | OUTPATIENT
Start: 2021-04-14 | End: 2021-04-28 | Stop reason: SDUPTHER

## 2021-04-14 RX ORDER — IBUPROFEN 800 MG/1
800 TABLET ORAL EVERY 8 HOURS PRN
Qty: 90 TABLET | Refills: 5 | Status: SHIPPED | OUTPATIENT
Start: 2021-04-14 | End: 2022-09-12 | Stop reason: SDUPTHER

## 2021-04-14 RX ORDER — ALBUTEROL SULFATE 90 UG/1
2 AEROSOL, METERED RESPIRATORY (INHALATION) EVERY 4 HOURS PRN
Qty: 18 G | Refills: 5 | Status: SHIPPED | OUTPATIENT
Start: 2021-04-14 | End: 2021-07-19 | Stop reason: SDUPTHER

## 2021-04-14 RX ORDER — CYCLOBENZAPRINE HCL 10 MG
10 TABLET ORAL
Qty: 30 TABLET | Refills: 3 | Status: SHIPPED | OUTPATIENT
Start: 2021-04-14 | End: 2022-09-12 | Stop reason: SDUPTHER

## 2021-04-28 DIAGNOSIS — E78.2 MIXED HYPERLIPIDEMIA: ICD-10-CM

## 2021-04-29 RX ORDER — ATORVASTATIN CALCIUM 20 MG/1
40 TABLET, FILM COATED ORAL EVERY EVENING
Qty: 30 TABLET | Refills: 2 | Status: SHIPPED | OUTPATIENT
Start: 2021-04-29 | End: 2021-07-19 | Stop reason: SDUPTHER

## 2021-05-10 ENCOUNTER — TELEPHONE (OUTPATIENT)
Dept: FAMILY MEDICINE CLINIC | Facility: CLINIC | Age: 66
End: 2021-05-10

## 2021-05-10 NOTE — TELEPHONE ENCOUNTER
"UNABLE TO WARM TRANSFER    Caller: ONAH ARELLANO    Relationship: Emergency Contact    Best call back number: [phone]    Who are you requesting to speak with (clinical staff, provider,  specific staff member): NURSE      What was the call regarding:     PATIENT'S WIFE STATES THAT HIS CHOLESTEROL MEDICATION (SHE DIDN'T KNOW THE NAME) WAS SUPPOSE TO BE CALLED IN AT A [facility] AND IT WAS NOT AND THEY ALREADY PICKED IT UP AND THEY NEED TO SPEAK TO SOMEONE ASAP.     Do you require a callback: YES    [id number] - [place], [place] - [address] [facility] - [phone] [facility] [phone]   [phone]          "

## 2021-06-28 DIAGNOSIS — E11.43 TYPE 2 DIABETES MELLITUS WITH DIABETIC AUTONOMIC NEUROPATHY, WITHOUT LONG-TERM CURRENT USE OF INSULIN (HCC): ICD-10-CM

## 2021-06-28 DIAGNOSIS — E78.2 MIXED HYPERLIPIDEMIA: ICD-10-CM

## 2021-07-13 LAB
ALBUMIN SERPL-MCNC: 4.1 G/DL (ref 3.8–4.8)
ALBUMIN/GLOB SERPL: 1.4 {RATIO} (ref 1.2–2.2)
ALP SERPL-CCNC: 72 IU/L (ref 48–121)
ALT SERPL-CCNC: 17 IU/L (ref 0–44)
AST SERPL-CCNC: 14 IU/L (ref 0–40)
BILIRUB SERPL-MCNC: 0.5 MG/DL (ref 0–1.2)
BUN SERPL-MCNC: 17 MG/DL (ref 8–27)
BUN/CREAT SERPL: 20 (ref 10–24)
CALCIUM SERPL-MCNC: 8.7 MG/DL (ref 8.6–10.2)
CHLORIDE SERPL-SCNC: 106 MMOL/L (ref 96–106)
CHOLEST SERPL-MCNC: 122 MG/DL (ref 100–199)
CHOLEST/HDLC SERPL: 3.3 RATIO (ref 0–5)
CO2 SERPL-SCNC: 24 MMOL/L (ref 20–29)
CREAT SERPL-MCNC: 0.83 MG/DL (ref 0.76–1.27)
GLOBULIN SER CALC-MCNC: 2.9 G/DL (ref 1.5–4.5)
GLUCOSE SERPL-MCNC: 114 MG/DL (ref 65–99)
HBA1C MFR BLD: 5.8 % (ref 4.8–5.6)
HDLC SERPL-MCNC: 37 MG/DL
LDLC SERPL CALC-MCNC: 64 MG/DL (ref 0–99)
POTASSIUM SERPL-SCNC: 5.1 MMOL/L (ref 3.5–5.2)
PROT SERPL-MCNC: 7 G/DL (ref 6–8.5)
SODIUM SERPL-SCNC: 142 MMOL/L (ref 134–144)
TRIGL SERPL-MCNC: 113 MG/DL (ref 0–149)
VLDLC SERPL CALC-MCNC: 21 MG/DL (ref 5–40)

## 2021-07-19 ENCOUNTER — OFFICE VISIT (OUTPATIENT)
Dept: FAMILY MEDICINE CLINIC | Facility: CLINIC | Age: 66
End: 2021-07-19

## 2021-07-19 VITALS
RESPIRATION RATE: 18 BRPM | SYSTOLIC BLOOD PRESSURE: 142 MMHG | DIASTOLIC BLOOD PRESSURE: 77 MMHG | TEMPERATURE: 97.7 F | OXYGEN SATURATION: 97 % | HEIGHT: 70 IN | WEIGHT: 211.6 LBS | HEART RATE: 56 BPM | BODY MASS INDEX: 30.29 KG/M2

## 2021-07-19 DIAGNOSIS — J45.20 MILD INTERMITTENT ASTHMA WITHOUT COMPLICATION: ICD-10-CM

## 2021-07-19 DIAGNOSIS — E66.3 OVERWEIGHT: ICD-10-CM

## 2021-07-19 DIAGNOSIS — E11.43 TYPE 2 DIABETES MELLITUS WITH DIABETIC AUTONOMIC NEUROPATHY, WITHOUT LONG-TERM CURRENT USE OF INSULIN (HCC): ICD-10-CM

## 2021-07-19 DIAGNOSIS — J44.9 ASTHMATIC BRONCHITIS , CHRONIC (HCC): ICD-10-CM

## 2021-07-19 DIAGNOSIS — E78.2 MIXED HYPERLIPIDEMIA: ICD-10-CM

## 2021-07-19 DIAGNOSIS — E78.2 MIXED HYPERLIPIDEMIA: Primary | ICD-10-CM

## 2021-07-19 PROCEDURE — 99214 OFFICE O/P EST MOD 30 MIN: CPT | Performed by: FAMILY MEDICINE

## 2021-07-19 RX ORDER — ALBUTEROL SULFATE 90 UG/1
2 AEROSOL, METERED RESPIRATORY (INHALATION) EVERY 4 HOURS PRN
Qty: 18 G | Refills: 5 | Status: SHIPPED | OUTPATIENT
Start: 2021-07-19 | End: 2022-09-12 | Stop reason: SDUPTHER

## 2021-07-19 RX ORDER — ATORVASTATIN CALCIUM 40 MG/1
40 TABLET, FILM COATED ORAL EVERY EVENING
COMMUNITY
Start: 2021-04-29 | End: 2021-07-19 | Stop reason: DRUGHIGH

## 2021-07-19 RX ORDER — ATORVASTATIN CALCIUM 20 MG/1
40 TABLET, FILM COATED ORAL EVERY EVENING
Qty: 30 TABLET | Refills: 5 | Status: SHIPPED | OUTPATIENT
Start: 2021-07-19 | End: 2021-11-22

## 2021-07-19 NOTE — ASSESSMENT & PLAN NOTE
Labs done 7-, read by me, reviewed with pt.  a1c was 5.8 down from 6.3  Improved.  Patient tolerated Metformin well without side effects. I feel the benefits of the medication outweigh the risks.  Encouraged to watch sugar intake, exercise more and lose weight.   Compliant with medication.   Monitoring sugar at home.   Follow up in 4 months  Care management needs are self-addressed.  Self-management abilities addressed and patient is capable of managing his own disease.

## 2021-07-19 NOTE — ASSESSMENT & PLAN NOTE
Lipid and CMP reviewed with patient--labs done 7-, read by me, reviewed with pt.  Trig. 113 up from 100, Tot. Chol. 122 up from 95, HDL 37 up from 35, LDL 64 up from 41.  Slightly  Worsening.  Patient tolerated Lipitor well without side effects. I feel the benefits of the medication outweigh the risks.  Encouraged to watch fatty intake, exercise more, and lose weight.   Compliant with medication  Is not getting adequate diet and exercise  Goals developed at last visit were not met because diet and exercise  Follow up in 4  months  Care management needs are self-addressed. Self-management abilities addressed and patient is capable of managing his own disease.

## 2021-07-22 NOTE — PROGRESS NOTES
Subjective   Jeffrey Aviles JR is a 65 y.o. male.   Chief Complaint   Patient presents with   • Hyperlipidemia   • Diabetes     Hyperlipidemia  This is a chronic problem. The current episode started more than 1 year ago. The problem is controlled. Pertinent negatives include no myalgias.   Diabetes  He presents for his follow-up diabetic visit. He has type 2 diabetes mellitus. His disease course has been stable. Pertinent negatives for diabetes include no polyphagia, no polyuria and no weight loss.        The following portions of the patient's history were reviewed and updated as appropriate: allergies, current medications, past family history, past medical history, past social history, past surgical history and problem list.    Past Medical History:   Diagnosis Date   • Abnormal chest CT    • Bilateral hearing loss    • History of asbestos exposure    • Mixed hyperlipidemia        Past Surgical History:   Procedure Laterality Date   • COLONOSCOPY  2016    Repeat    • PAROTID DUCT LIGATION          Family History   Problem Relation Age of Onset   • Cerebral aneurysm Mother          at 42   • Hypertension Mother    • Hyperlipidemia Father    • Hypertension Father    • Other Father         spinal stenosis   • Asthma Father         living at 86   • Prostate cancer Father    • Cancer Father         Prostate cancer  at 87   • Breast cancer Sister    • Hyperlipidemia Sister    • Prostate cancer Maternal Grandfather    • Diabetes Maternal Uncle    • Heart disease Maternal Uncle    • Lung cancer Maternal Uncle    • Hypertension Brother    • Hyperlipidemia Brother    • Other Brother 30        Hepatitis   • Stroke Maternal Grandmother         Social History     Socioeconomic History   • Marital status:      Spouse name: Not on file   • Number of children: Not on file   • Years of education: Not on file   • Highest education level: Not on file   Tobacco Use   • Smoking status: Never Smoker   •  "Smokeless tobacco: Never Used   Substance and Sexual Activity   • Alcohol use: No   • Drug use: No         Review of Systems   Constitutional: Negative for unexpected weight gain and unexpected weight loss.   Eyes: Negative for visual disturbance.   Gastrointestinal: Negative for nausea.   Endocrine: Negative for polyphagia and polyuria.   Genitourinary: Negative for frequency.   Musculoskeletal: Negative for myalgias.   Skin: Negative for dry skin and skin lesions.   Neurological: Negative for syncope and numbness.       Objective   Visit Vitals  /77 (BP Location: Right arm, Patient Position: Sitting, Cuff Size: Adult)   Pulse 56   Temp 97.7 °F (36.5 °C) (Temporal)   Resp 18   Ht 177.8 cm (70\")   Wt 96 kg (211 lb 9.6 oz)   SpO2 97%   BMI 30.36 kg/m²     Physical Exam  Vitals and nursing note reviewed.   Constitutional:       Appearance: He is well-developed.   HENT:      Head: Normocephalic.   Neck:      Thyroid: No thyromegaly.      Vascular: No carotid bruit.      Trachea: Trachea normal.   Cardiovascular:      Rate and Rhythm: Normal rate and regular rhythm.      Heart sounds: No murmur heard.   No friction rub. No gallop.    Pulmonary:      Effort: Pulmonary effort is normal. No respiratory distress.      Breath sounds: Normal breath sounds. No wheezing.   Chest:      Chest wall: No tenderness.   Musculoskeletal:      Cervical back: Neck supple.   Skin:     General: Skin is dry.      Findings: No rash.      Nails: There is no clubbing.   Neurological:      Mental Status: He is alert and oriented to person, place, and time.   Psychiatric:         Behavior: Behavior is cooperative.         Assessment/Plan   Problem List Items Addressed This Visit        Medium    Mixed hyperlipidemia - Primary    Current Assessment & Plan     Lipid and CMP reviewed with patient--labs done 7-, read by me, reviewed with pt.  Trig. 113 up from 100, Tot. Chol. 122 up from 95, HDL 37 up from 35, LDL 64 up from " 41.  Slightly  Worsening.  Patient tolerated Lipitor well without side effects. I feel the benefits of the medication outweigh the risks.  Encouraged to watch fatty intake, exercise more, and lose weight.   Compliant with medication  Is not getting adequate diet and exercise  Goals developed at last visit were not met because diet and exercise  Follow up in 4  months  Care management needs are self-addressed. Self-management abilities addressed and patient is capable of managing his own disease.           Relevant Orders    Lipid Panel With / Chol / HDL Ratio    Comprehensive metabolic panel    Type 2 diabetes mellitus with diabetic autonomic neuropathy, without long-term current use of insulin (CMS/[facility])    Current Assessment & Plan     Labs done 7-, read by me, reviewed with pt.  a1c was 5.8 down from 6.3  Improved.  Patient tolerated Metformin well without side effects. I feel the benefits of the medication outweigh the risks.  Encouraged to watch sugar intake, exercise more and lose weight.   Compliant with medication.   Monitoring sugar at home.   Follow up in 4 months  Care management needs are self-addressed.  Self-management abilities addressed and patient is capable of managing his own disease.         Relevant Orders    Hemoglobin A1c       Unprioritized    Overweight    Current Assessment & Plan     Improved, pt. Lost 18 Lbs.

## 2021-11-10 DIAGNOSIS — E78.2 MIXED HYPERLIPIDEMIA: ICD-10-CM

## 2021-11-10 DIAGNOSIS — E11.43 TYPE 2 DIABETES MELLITUS WITH DIABETIC AUTONOMIC NEUROPATHY, WITHOUT LONG-TERM CURRENT USE OF INSULIN (HCC): Primary | ICD-10-CM

## 2021-11-11 LAB
ALBUMIN SERPL-MCNC: 4.6 G/DL (ref 3.8–4.8)
ALBUMIN/GLOB SERPL: 1.9 {RATIO} (ref 1.2–2.2)
ALP SERPL-CCNC: 62 IU/L (ref 44–121)
ALT SERPL-CCNC: 17 IU/L (ref 0–44)
AST SERPL-CCNC: 18 IU/L (ref 0–40)
BILIRUB SERPL-MCNC: 0.6 MG/DL (ref 0–1.2)
BUN SERPL-MCNC: 19 MG/DL (ref 8–27)
BUN/CREAT SERPL: 23 (ref 10–24)
CALCIUM SERPL-MCNC: 8.8 MG/DL (ref 8.6–10.2)
CHLORIDE SERPL-SCNC: 104 MMOL/L (ref 96–106)
CHOLEST SERPL-MCNC: 126 MG/DL (ref 100–199)
CHOLEST/HDLC SERPL: 3.1 RATIO (ref 0–5)
CO2 SERPL-SCNC: 24 MMOL/L (ref 20–29)
CREAT SERPL-MCNC: 0.82 MG/DL (ref 0.76–1.27)
GLOBULIN SER CALC-MCNC: 2.4 G/DL (ref 1.5–4.5)
GLUCOSE SERPL-MCNC: 105 MG/DL (ref 65–99)
HBA1C MFR BLD: 6.1 % (ref 4.8–5.6)
HDLC SERPL-MCNC: 41 MG/DL
LDLC SERPL CALC-MCNC: 67 MG/DL (ref 0–99)
POTASSIUM SERPL-SCNC: 4.9 MMOL/L (ref 3.5–5.2)
PROT SERPL-MCNC: 7 G/DL (ref 6–8.5)
SODIUM SERPL-SCNC: 141 MMOL/L (ref 134–144)
TRIGL SERPL-MCNC: 92 MG/DL (ref 0–149)
VLDLC SERPL CALC-MCNC: 18 MG/DL (ref 5–40)

## 2021-11-18 ENCOUNTER — OFFICE VISIT (OUTPATIENT)
Dept: FAMILY MEDICINE CLINIC | Facility: CLINIC | Age: 66
End: 2021-11-18

## 2021-11-18 VITALS
BODY MASS INDEX: 31.87 KG/M2 | RESPIRATION RATE: 18 BRPM | DIASTOLIC BLOOD PRESSURE: 77 MMHG | WEIGHT: 215.2 LBS | HEART RATE: 59 BPM | TEMPERATURE: 96.8 F | SYSTOLIC BLOOD PRESSURE: 136 MMHG | HEIGHT: 69 IN | OXYGEN SATURATION: 95 %

## 2021-11-18 DIAGNOSIS — E78.2 MIXED HYPERLIPIDEMIA: Primary | ICD-10-CM

## 2021-11-18 DIAGNOSIS — Z83.71 FAMILY HISTORY OF POLYPS IN THE COLON: ICD-10-CM

## 2021-11-18 DIAGNOSIS — E11.43 TYPE 2 DIABETES MELLITUS WITH DIABETIC AUTONOMIC NEUROPATHY, WITHOUT LONG-TERM CURRENT USE OF INSULIN (HCC): ICD-10-CM

## 2021-11-18 PROCEDURE — 99214 OFFICE O/P EST MOD 30 MIN: CPT | Performed by: FAMILY MEDICINE

## 2021-11-18 NOTE — ASSESSMENT & PLAN NOTE
Worsening. Hgb a1c 6.1 up from 5.8  Encouraged to watch sugar intake, exercise more and lose weight.   compliant with medication. Patient tolerated metformin well without side effects. I feel the benefits of the medication outweigh the risks.  Not monitoring sugar at home.   Follow up in 3 months  Care management needs are self-addressed.  . Self-management abilities addressed and patient is capable of managing his own disease.

## 2021-11-18 NOTE — ASSESSMENT & PLAN NOTE
Lipid and CMP reviewed with patient--doing well.  . Chol 126 up from 122, trig 92 down from 113, HDL 41 up from 37, LDL 67 up from 64  Encouraged to watch fatty intake, exercise more, and lose weight.   compliant with medication Patient tolerated lipitor well without side effects. I feel the benefits of the medication outweigh the risks.  Is not getting adequate diet and exercise  Goals developed at last visit were  met   Follow up in 6  months  Care management needs are self-addressed. Self-management abilities addressed and patient is capable of managing his own disease.

## 2021-11-18 NOTE — PROGRESS NOTES
Subjective   Jeffrey Aviles JR is a 66 y.o. male.   Chief Complaint   Patient presents with   • Diabetes   • Hyperlipidemia     Diabetes  He presents for his follow-up diabetic visit. He has type 2 diabetes mellitus. His disease course has been worsening. There are no hypoglycemic associated symptoms. There are no diabetic associated symptoms. Pertinent negatives for diabetes include no polyphagia, no polyuria and no weight loss. There are no hypoglycemic complications. Risk factors for coronary artery disease include dyslipidemia and diabetes mellitus. He is compliant with treatment all of the time. He never participates in exercise. He does not see a podiatrist.Eye exam is current.   Hyperlipidemia  This is a chronic problem. The current episode started more than 1 year ago. The problem is controlled. Recent lipid tests were reviewed and are normal. Factors aggravating his hyperlipidemia include fatty foods. Pertinent negatives include no myalgias. Current antihyperlipidemic treatment includes statins. The current treatment provides moderate improvement of lipids. There are no compliance problems.  Risk factors for coronary artery disease include dyslipidemia and diabetes mellitus.        The following portions of the patient's history were reviewed and updated as appropriate: allergies, current medications, past family history, past medical history, past social history, past surgical history and problem list.    Past Medical History:   Diagnosis Date   • Abnormal chest CT    • Bilateral hearing loss    • History of asbestos exposure    • Mixed hyperlipidemia        Past Surgical History:   Procedure Laterality Date   • COLONOSCOPY  2016    Repeat    • PAROTID DUCT LIGATION          Family History   Problem Relation Age of Onset   • Cerebral aneurysm Mother          at 42   • Hypertension Mother    • Hyperlipidemia Father    • Hypertension Father    • Other Father         spinal stenosis   • Asthma  "Father         living at 86   • Prostate cancer Father    • Cancer Father         Prostate cancer  at 87   • Breast cancer Sister    • Hyperlipidemia Sister    • Prostate cancer Maternal Grandfather    • Diabetes Maternal Uncle    • Heart disease Maternal Uncle    • Lung cancer Maternal Uncle    • Hypertension Brother    • Hyperlipidemia Brother    • Other Brother 30        Hepatitis   • Stroke Maternal Grandmother         Social History     Socioeconomic History   • Marital status:    Tobacco Use   • Smoking status: Never Smoker   • Smokeless tobacco: Never Used   Substance and Sexual Activity   • Alcohol use: No   • Drug use: No         Review of Systems   Constitutional: Negative for unexpected weight gain and unexpected weight loss.   Eyes: Negative for visual disturbance.   Gastrointestinal: Negative for nausea.   Endocrine: Negative for polyphagia and polyuria.   Genitourinary: Negative for frequency.   Musculoskeletal: Negative for myalgias.   Skin: Negative for dry skin and skin lesions.   Neurological: Negative for syncope and numbness.       Objective   Visit Vitals  /77 (BP Location: Right arm, Patient Position: Sitting, Cuff Size: Adult)   Pulse 59   Temp 96.8 °F (36 °C) (Temporal)   Resp 18   Ht 175.3 cm (69\")   Wt 97.6 kg (215 lb 3.2 oz)   SpO2 95%   BMI 31.78 kg/m²     Physical Exam  Vitals and nursing note reviewed.   Constitutional:       Appearance: He is well-developed.   HENT:      Head: Normocephalic.   Neck:      Thyroid: No thyromegaly.      Vascular: No carotid bruit.      Trachea: Trachea normal.   Cardiovascular:      Rate and Rhythm: Normal rate and regular rhythm.      Heart sounds: No murmur heard.  No friction rub. No gallop.    Pulmonary:      Effort: Pulmonary effort is normal. No respiratory distress.      Breath sounds: Normal breath sounds. No wheezing.   Chest:      Chest wall: No tenderness.   Musculoskeletal:      Cervical back: Neck supple.   Skin:     " General: Skin is dry.      Findings: No rash.      Nails: There is no clubbing.   Neurological:      Mental Status: He is alert and oriented to person, place, and time.   Psychiatric:         Behavior: Behavior is cooperative.         Assessment/Plan   Problem List Items Addressed This Visit        Medium    Mixed hyperlipidemia - Primary    Current Assessment & Plan     Lipid and CMP reviewed with patient--doing well.  . Chol 126 up from 122, trig 92 down from 113, HDL 41 up from 37, LDL 67 up from 64  Encouraged to watch fatty intake, exercise more, and lose weight.   compliant with medication Patient tolerated lipitor well without side effects. I feel the benefits of the medication outweigh the risks.  Is not getting adequate diet and exercise  Goals developed at last visit were  met   Follow up in 6  months  Care management needs are self-addressed. Self-management abilities addressed and patient is capable of managing his own disease.           Type 2 diabetes mellitus with diabetic autonomic neuropathy, without long-term current use of insulin (HCC)    Current Assessment & Plan     Worsening. Hgb a1c 6.1 up from 5.8  Encouraged to watch sugar intake, exercise more and lose weight.   compliant with medication. Patient tolerated metformin well without side effects. I feel the benefits of the medication outweigh the risks.  Not monitoring sugar at home.   Follow up in 3 months  Care management needs are self-addressed.  . Self-management abilities addressed and patient is capable of managing his own disease.              Unprioritized    Family history of polyps in the colon    Overview     Last colonoscopy done 6-2015         Current Assessment & Plan     Discussed repeat colonoscopy.  Pt. Will research family hx. If no true family hx.  Pt. Will need to repeat in 2025, if family hx. Is found will need to repeat sooner.  Questionable if sister had precancerous polyps.                         Suturegard Retention Suture: 2-0 Nylon

## 2021-11-18 NOTE — ASSESSMENT & PLAN NOTE
Discussed repeat colonoscopy.  Pt. Will research family hx. If no true family hx.  Pt. Will need to repeat in 2025, if family hx. Is found will need to repeat sooner.  Questionable if sister had precancerous polyps.

## 2021-11-21 DIAGNOSIS — E78.2 MIXED HYPERLIPIDEMIA: ICD-10-CM

## 2021-11-22 RX ORDER — ATORVASTATIN CALCIUM 20 MG/1
40 TABLET, FILM COATED ORAL EVERY EVENING
Qty: 30 TABLET | Refills: 2 | Status: SHIPPED | OUTPATIENT
Start: 2021-11-22 | End: 2021-11-29

## 2021-11-29 DIAGNOSIS — E78.2 MIXED HYPERLIPIDEMIA: ICD-10-CM

## 2021-11-29 RX ORDER — ATORVASTATIN CALCIUM 20 MG/1
40 TABLET, FILM COATED ORAL EVERY EVENING
Qty: 30 TABLET | Refills: 2 | Status: SHIPPED | OUTPATIENT
Start: 2021-11-29 | End: 2021-12-30

## 2021-12-28 ENCOUNTER — TELEPHONE (OUTPATIENT)
Dept: FAMILY MEDICINE CLINIC | Facility: CLINIC | Age: 66
End: 2021-12-28

## 2021-12-28 DIAGNOSIS — U07.1 COVID-19: Primary | ICD-10-CM

## 2021-12-28 NOTE — TELEPHONE ENCOUNTER
Caller: NOAH ARELLANO    Relationship to patient: Emergency Contact    Best call back number:     Date of exposure:     Date of positive COVID19 test: 12/24/21    Date if possible COVID19 exposure:    COVID19 symptoms: COUGH, CONGESTION, FEVER, HEADACHE, LOWER BACK PAIN    Date of initial quarantine:     Additional information or concerns: PTS WIFE IS CALLING IN STATING THAT THE PATIENT TESTED POSITIVE FOR COVID ON 12/24/21 AND SHE WANTS TO GET HIM SCHEDULED TO HAVE THE INFUSION.  SHE SAID THAT TOMORROW WILL BE THE LAST DAY THAT HE CAN GET IT.  SHE TRIED TO GET IT AT THE URGENT CARE THAT HE WAS DIAGNOSED AT AND THEY TOLD HER TO CONTACT HIS DOCTOR TO GET THIS DONE.  HIS OXYGEN LEVEL IS 95(ABOUT 5 MINUTES AGO). SHE WANTS TO KNOW IF THE INFUSIONS ARE DONE IN THE OFFICE OR IF SHE WOULD HAVE TO TAKE HIM SOMEWHERE ELSE TO GET THIS DONE.        What is the patients preferred pharmacy:

## 2021-12-28 NOTE — TELEPHONE ENCOUNTER
Patient is scheduled for the infusion on Thursday-  Wife requested an order for a CXR also.  Also scheduled for a video visit on Thursday

## 2021-12-29 DIAGNOSIS — E78.2 MIXED HYPERLIPIDEMIA: ICD-10-CM

## 2021-12-30 ENCOUNTER — TELEMEDICINE (OUTPATIENT)
Dept: FAMILY MEDICINE CLINIC | Facility: CLINIC | Age: 66
End: 2021-12-30

## 2021-12-30 DIAGNOSIS — U07.1 PNEUMONIA DUE TO COVID-19 VIRUS: Primary | ICD-10-CM

## 2021-12-30 DIAGNOSIS — J12.82 PNEUMONIA DUE TO COVID-19 VIRUS: Primary | ICD-10-CM

## 2021-12-30 PROCEDURE — 99214 OFFICE O/P EST MOD 30 MIN: CPT | Performed by: FAMILY MEDICINE

## 2021-12-30 RX ORDER — ATORVASTATIN CALCIUM 20 MG/1
40 TABLET, FILM COATED ORAL EVERY EVENING
Qty: 60 TABLET | Refills: 0 | Status: SHIPPED | OUTPATIENT
Start: 2021-12-30 | End: 2022-01-24

## 2021-12-30 RX ORDER — PREDNISONE 10 MG/1
10 TABLET ORAL TAKE AS DIRECTED
Qty: 35 TABLET | Refills: 0 | Status: SHIPPED | OUTPATIENT
Start: 2021-12-30 | End: 2022-01-14

## 2021-12-30 RX ORDER — AZITHROMYCIN 250 MG/1
TABLET, FILM COATED ORAL
Qty: 6 TABLET | Refills: 0 | Status: SHIPPED | OUTPATIENT
Start: 2021-12-30 | End: 2022-02-15

## 2022-01-03 ENCOUNTER — OFFICE VISIT (OUTPATIENT)
Dept: FAMILY MEDICINE CLINIC | Facility: CLINIC | Age: 67
End: 2022-01-03

## 2022-01-03 VITALS
BODY MASS INDEX: 30.96 KG/M2 | DIASTOLIC BLOOD PRESSURE: 69 MMHG | WEIGHT: 209 LBS | SYSTOLIC BLOOD PRESSURE: 122 MMHG | TEMPERATURE: 97.7 F | HEART RATE: 75 BPM | RESPIRATION RATE: 20 BRPM | OXYGEN SATURATION: 93 % | HEIGHT: 69 IN

## 2022-01-03 DIAGNOSIS — J12.82 PNEUMONIA DUE TO COVID-19 VIRUS: Primary | ICD-10-CM

## 2022-01-03 DIAGNOSIS — U07.1 PNEUMONIA DUE TO COVID-19 VIRUS: Primary | ICD-10-CM

## 2022-01-03 PROCEDURE — 99212 OFFICE O/P EST SF 10 MIN: CPT | Performed by: FAMILY MEDICINE

## 2022-01-03 NOTE — ASSESSMENT & PLAN NOTE
Improved with Z-suzanne.  0-10 on a scale if improvement pt. Stated he is now a 3.  --Advised to increase fluids and rest.  Offered follow up, pt. Declines at present.  I advised him if he worsens to return here or go to Urgent care.

## 2022-01-03 NOTE — PROGRESS NOTES
Subjective   Jeffrey Aviles JR is a 66 y.o. male.   Chief Complaint   Patient presents with   • Pneumonia     Pneumonia  He complains of cough. There is no shortness of breath or wheezing. This is a new problem. The current episode started 1 to 4 weeks ago (2021). The problem occurs constantly. The problem has been gradually improving. The cough is productive of sputum. Associated symptoms include myalgias. Pertinent negatives include no ear pain, fever or sore throat. His symptoms are aggravated by nothing. He reports moderate improvement on treatment. His past medical history is significant for asthma.        The following portions of the patient's history were reviewed and updated as appropriate: allergies, current medications, past family history, past medical history, past social history, past surgical history and problem list.    Past Medical History:   Diagnosis Date   • Abnormal chest CT    • Bilateral hearing loss    • History of asbestos exposure    • Mixed hyperlipidemia        Past Surgical History:   Procedure Laterality Date   • COLONOSCOPY  2016    Repeat 6-   • PAROTID DUCT LIGATION          Family History   Problem Relation Age of Onset   • Cerebral aneurysm Mother          at 42   • Hypertension Mother    • Hyperlipidemia Father    • Hypertension Father    • Other Father         spinal stenosis   • Asthma Father         living at 86   • Prostate cancer Father    • Cancer Father         Prostate cancer  at 87   • Breast cancer Sister    • Hyperlipidemia Sister    • Prostate cancer Maternal Grandfather    • Diabetes Maternal Uncle    • Heart disease Maternal Uncle    • Lung cancer Maternal Uncle    • Hypertension Brother    • Hyperlipidemia Brother    • Other Brother 30        Hepatitis   • Stroke Maternal Grandmother         Social History     Socioeconomic History   • Marital status:    Tobacco Use   • Smoking status: Never Smoker   • Smokeless tobacco: Never Used  "  Substance and Sexual Activity   • Alcohol use: No   • Drug use: No         Review of Systems   Constitutional: Negative for fatigue and fever.   HENT: Negative for ear pain, sore throat and voice change.    Respiratory: Positive for cough. Negative for shortness of breath and wheezing.    Gastrointestinal: Negative for diarrhea, nausea and vomiting.   Musculoskeletal: Positive for myalgias.   Skin: Negative for rash.   Neurological: Negative for headache.       Objective   Visit Vitals  /69 (BP Location: Right arm, Patient Position: Sitting, Cuff Size: Adult)   Pulse 75   Temp 97.7 °F (36.5 °C) (Temporal)   Resp 20   Ht 175.3 cm (69\")   Wt 94.8 kg (209 lb)   SpO2 93%   BMI 30.86 kg/m²     Physical Exam  Vitals and nursing note reviewed.   Constitutional:       Appearance: He is well-developed.   HENT:      Head: Normocephalic.   Neck:      Thyroid: No thyromegaly.      Vascular: No carotid bruit.      Trachea: Trachea normal.   Cardiovascular:      Rate and Rhythm: Normal rate and regular rhythm.      Heart sounds: No murmur heard.  No friction rub. No gallop.    Pulmonary:      Effort: Pulmonary effort is normal. No respiratory distress.      Breath sounds: Normal breath sounds. No wheezing.   Chest:      Chest wall: No tenderness.   Musculoskeletal:      Cervical back: Neck supple.   Skin:     General: Skin is dry.      Findings: No rash.      Nails: There is no clubbing.   Neurological:      Mental Status: He is alert and oriented to person, place, and time.   Psychiatric:         Behavior: Behavior is cooperative.         Assessment/Plan   Problem List Items Addressed This Visit        Medium    Pneumonia due to COVID-19 virus - Primary    Current Assessment & Plan     Improved with Z-suzanne.  0-10 on a scale if improvement pt. Stated he is now a 3.  --Advised to increase fluids and rest.  Offered follow up, pt. Declines at present.  I advised him if he worsens to return here or go to Urgent care.         "

## 2022-01-21 DIAGNOSIS — E78.2 MIXED HYPERLIPIDEMIA: ICD-10-CM

## 2022-01-24 RX ORDER — ATORVASTATIN CALCIUM 20 MG/1
40 TABLET, FILM COATED ORAL EVERY EVENING
Qty: 60 TABLET | Refills: 0 | Status: SHIPPED | OUTPATIENT
Start: 2022-01-24 | End: 2022-02-16

## 2022-02-14 DIAGNOSIS — E78.2 MIXED HYPERLIPIDEMIA: ICD-10-CM

## 2022-02-14 DIAGNOSIS — E78.2 MIXED HYPERLIPIDEMIA: Primary | ICD-10-CM

## 2022-02-14 DIAGNOSIS — E11.43 TYPE 2 DIABETES MELLITUS WITH DIABETIC AUTONOMIC NEUROPATHY, WITHOUT LONG-TERM CURRENT USE OF INSULIN: ICD-10-CM

## 2022-02-15 ENCOUNTER — OFFICE VISIT (OUTPATIENT)
Dept: FAMILY MEDICINE CLINIC | Facility: CLINIC | Age: 67
End: 2022-02-15

## 2022-02-15 VITALS
HEART RATE: 77 BPM | HEIGHT: 69 IN | BODY MASS INDEX: 32.35 KG/M2 | OXYGEN SATURATION: 97 % | SYSTOLIC BLOOD PRESSURE: 153 MMHG | WEIGHT: 218.4 LBS | RESPIRATION RATE: 15 BRPM | DIASTOLIC BLOOD PRESSURE: 77 MMHG | TEMPERATURE: 97.3 F

## 2022-02-15 DIAGNOSIS — E66.3 OVERWEIGHT: ICD-10-CM

## 2022-02-15 DIAGNOSIS — E78.2 MIXED HYPERLIPIDEMIA: Primary | ICD-10-CM

## 2022-02-15 DIAGNOSIS — N52.9 ERECTILE DYSFUNCTION, UNSPECIFIED ERECTILE DYSFUNCTION TYPE: ICD-10-CM

## 2022-02-15 DIAGNOSIS — E11.43 TYPE 2 DIABETES MELLITUS WITH DIABETIC AUTONOMIC NEUROPATHY, WITHOUT LONG-TERM CURRENT USE OF INSULIN: ICD-10-CM

## 2022-02-15 LAB
ALBUMIN SERPL-MCNC: 4.3 G/DL (ref 3.8–4.8)
ALBUMIN/GLOB SERPL: 1.9 {RATIO} (ref 1.2–2.2)
ALP SERPL-CCNC: 54 IU/L (ref 44–121)
ALT SERPL-CCNC: 17 IU/L (ref 0–44)
AST SERPL-CCNC: 13 IU/L (ref 0–40)
BILIRUB SERPL-MCNC: 0.5 MG/DL (ref 0–1.2)
BUN SERPL-MCNC: 21 MG/DL (ref 8–27)
BUN/CREAT SERPL: 30 (ref 10–24)
CALCIUM SERPL-MCNC: 8.5 MG/DL (ref 8.6–10.2)
CHLORIDE SERPL-SCNC: 106 MMOL/L (ref 96–106)
CHOLEST SERPL-MCNC: 136 MG/DL (ref 100–199)
CHOLEST/HDLC SERPL: 3.2 RATIO (ref 0–5)
CO2 SERPL-SCNC: 25 MMOL/L (ref 20–29)
CREAT SERPL-MCNC: 0.69 MG/DL (ref 0.76–1.27)
GLOBULIN SER CALC-MCNC: 2.3 G/DL (ref 1.5–4.5)
GLUCOSE SERPL-MCNC: 111 MG/DL (ref 65–99)
HBA1C MFR BLD: 5.9 % (ref 4.8–5.6)
HDLC SERPL-MCNC: 43 MG/DL
LDLC SERPL CALC-MCNC: 76 MG/DL (ref 0–99)
POTASSIUM SERPL-SCNC: 4.6 MMOL/L (ref 3.5–5.2)
PROT SERPL-MCNC: 6.6 G/DL (ref 6–8.5)
SODIUM SERPL-SCNC: 141 MMOL/L (ref 134–144)
TRIGL SERPL-MCNC: 88 MG/DL (ref 0–149)
VLDLC SERPL CALC-MCNC: 17 MG/DL (ref 5–40)

## 2022-02-15 PROCEDURE — 99214 OFFICE O/P EST MOD 30 MIN: CPT | Performed by: FAMILY MEDICINE

## 2022-02-15 RX ORDER — TADALAFIL 5 MG/1
5 TABLET ORAL DAILY PRN
Qty: 90 TABLET | Refills: 2 | Status: SHIPPED | OUTPATIENT
Start: 2022-02-15 | End: 2022-09-12 | Stop reason: SDUPTHER

## 2022-02-15 RX ORDER — TADALAFIL 20 MG/1
20 TABLET ORAL DAILY PRN
Qty: 30 TABLET | Refills: 5 | Status: SHIPPED | OUTPATIENT
Start: 2022-02-15 | End: 2022-02-15 | Stop reason: SDUPTHER

## 2022-02-15 RX ORDER — TADALAFIL 20 MG/1
20 TABLET ORAL DAILY PRN
Qty: 90 TABLET | Refills: 2 | Status: SHIPPED | OUTPATIENT
Start: 2022-02-15 | End: 2022-02-15 | Stop reason: SDUPTHER

## 2022-02-15 NOTE — PROGRESS NOTES
Subjective   Jeffrey Aviles JR is a 66 y.o. male.     Hypertension  This is a chronic problem. The current episode started more than 1 year ago. The problem has been gradually worsening since onset. The problem is controlled. Pertinent negatives include no chest pain, palpitations or shortness of breath. Risk factors for coronary artery disease include dyslipidemia and diabetes mellitus. Past treatments include nothing. Current antihypertension treatment includes nothing.   Hyperlipidemia  This is a chronic problem. The current episode started more than 1 year ago. The problem is controlled. Pertinent negatives include no chest pain, myalgias or shortness of breath. Current antihyperlipidemic treatment includes statins. Risk factors for coronary artery disease include diabetes mellitus, hypertension and dyslipidemia.   Diabetes  He presents for his follow-up diabetic visit. He has type 2 diabetes mellitus. His disease course has been improving. There are no hypoglycemic associated symptoms. There are no diabetic associated symptoms. Pertinent negatives for diabetes include no chest pain, no fatigue, no polyphagia, no polyuria and no weight loss. There are no hypoglycemic complications. There are no diabetic complications. Risk factors for coronary artery disease include dyslipidemia, diabetes mellitus and hypertension.        The following portions of the patient's history were reviewed and updated as appropriate: current medications, past family history, past medical history, past social history, past surgical history and problem list.    Family History   Problem Relation Age of Onset   • Cerebral aneurysm Mother          at 42   • Hypertension Mother    • Hyperlipidemia Father    • Hypertension Father    • Other Father         spinal stenosis   • Asthma Father         living at 86   • Prostate cancer Father    • Cancer Father         Prostate cancer  at 87   • Breast cancer Sister    • Hyperlipidemia  Sister    • Prostate cancer Maternal Grandfather    • Diabetes Maternal Uncle    • Heart disease Maternal Uncle    • Lung cancer Maternal Uncle    • Hypertension Brother    • Hyperlipidemia Brother    • Other Brother 30        Hepatitis   • Stroke Maternal Grandmother        Social History     Tobacco Use   • Smoking status: Never Smoker   • Smokeless tobacco: Never Used   Substance Use Topics   • Alcohol use: No   • Drug use: No       Past Surgical History:   Procedure Laterality Date   • COLONOSCOPY  06/2016    Repeat 6-21-6-22   • PAROTID DUCT LIGATION         Patient Active Problem List   Diagnosis   • Bilateral hearing loss   • Chronic allergic rhinitis due to pollen   • Displacement of cervical intervertebral disc   • History of asbestos exposure   • Mixed hyperlipidemia   • Overweight   • Mild intermittent asthma without complication   • Type 2 diabetes mellitus with diabetic autonomic neuropathy, without long-term current use of insulin (HCC)   • Abnormal chest CT   • Screening PSA (prostate specific antigen)   • Arthritis, multiple joint involvement   • Paraspinal muscle spasm   • Family history of polyps in the colon   • Myalgia   • Encounter for general adult medical examination with abnormal findings   • Pneumonia due to COVID-19 virus   • Erectile dysfunction       Current Outpatient Medications on File Prior to Visit   Medication Sig Dispense Refill   • albuterol sulfate  (90 Base) MCG/ACT inhaler Inhale 2 puffs Every 4 (Four) Hours As Needed for Wheezing. 18 g 5   • Asmanex, 30 Metered Doses, 220 MCG/INH inhaler Inhale 2 puffs Daily. 1 each 5   • aspirin 81 MG tablet Take 81 mg by mouth Daily.     • cyclobenzaprine (FLEXERIL) 10 MG tablet Take 1 tablet by mouth every night at bedtime. 30 tablet 3   • fluticasone (FLONASE) 50 MCG/ACT nasal spray 2 sprays into the nostril(s) as directed by provider Daily.     • ibuprofen (ADVIL,MOTRIN) 800 MG tablet Take 1 tablet by mouth Every 8 (Eight) Hours As  "Needed for Mild Pain . 90 tablet 5   • loratadine (CLARITIN) 10 MG tablet Take 1 tablet by mouth Daily.     • metFORMIN (GLUCOPHAGE) 1000 MG tablet Take 1 tablet by mouth 2 (Two) Times a Day. 60 tablet 5     No current facility-administered medications on file prior to visit.       No Known Allergies    Review of Systems   Constitutional: Negative for fatigue, unexpected weight gain and unexpected weight loss.   Eyes: Negative for visual disturbance.   Respiratory: Negative for shortness of breath.    Cardiovascular: Negative for chest pain, palpitations and leg swelling.   Gastrointestinal: Negative for nausea.   Endocrine: Negative for polyphagia and polyuria.   Genitourinary: Negative for frequency.   Musculoskeletal: Negative for myalgias.   Skin: Negative for dry skin and skin lesions.   Neurological: Negative for syncope, numbness and headache.       Objective   Visit Vitals  /77 (BP Location: Left arm, Patient Position: Sitting, Cuff Size: Adult)   Pulse 77   Temp 97.3 °F (36.3 °C)   Resp 15   Ht 175.3 cm (69\")   Wt 99.1 kg (218 lb 6.4 oz)   SpO2 97%   BMI 32.25 kg/m²     Physical Exam  Vitals and nursing note reviewed.   Constitutional:       Appearance: He is well-developed.   HENT:      Head: Normocephalic.   Neck:      Thyroid: No thyromegaly.      Vascular: No carotid bruit.      Trachea: Trachea normal.   Cardiovascular:      Rate and Rhythm: Normal rate and regular rhythm.      Heart sounds: No murmur heard.  No friction rub. No gallop.    Pulmonary:      Effort: Pulmonary effort is normal. No respiratory distress.      Breath sounds: Normal breath sounds. No wheezing.   Chest:      Chest wall: No tenderness.   Musculoskeletal:      Cervical back: Neck supple.   Skin:     General: Skin is dry.      Findings: No rash.      Nails: There is no clubbing.   Neurological:      Mental Status: He is alert and oriented to person, place, and time.   Psychiatric:         Behavior: Behavior is cooperative. "           Assessment/Plan .  Problem List Items Addressed This Visit        Medium    Mixed hyperlipidemia - Primary    Current Assessment & Plan     Lipid and CMP reviewed with patient  Doing well;  Cho 136 up from 126, trig 88 down from 92, HDL 43 up from 41, LDL 76 up from 67  Encouraged to watch fatty intake, exercise more, and lose weight.   compliant with medication  Patient tolerated lipitor well without side effects. I feel the benefits of the medication outweigh the risks.    Is not getting adequate diet and exercise  Goals developed at last visit were met   Follow up in 6  months  Care management needs are self-addressed. Self-management abilities addressed and patient is capable of managing his own disease.           Type 2 diabetes mellitus with diabetic autonomic neuropathy, without long-term current use of insulin (HCC)    Current Assessment & Plan       Improved, Hgb a1c 5.9 down from 6.1  Encouraged to watch sugar intake, exercise more and lose weight.   compliant with medication. Patient tolerated Metformin well without side effects. I feel the benefits of the medication outweigh the risks.    Not monitoring sugar at home.   Follow up in 6 months  Care management needs are self-addressed.   Self-management abilities addressed and patient is capable of managing his/her own disease.              Unprioritized    Erectile dysfunction    Current Assessment & Plan     Worsening; Will start Cialis 5mg- start 4 pills on day 1 then 1 daily. Patient states that they have started watching a Podcast recently.         Relevant Medications    tadalafil (Cialis) 5 MG tablet

## 2022-02-15 NOTE — ASSESSMENT & PLAN NOTE
Lipid and CMP reviewed with patient  Doing well;  Cho 136 up from 126, trig 88 down from 92, HDL 43 up from 41, LDL 76 up from 67  Encouraged to watch fatty intake, exercise more, and lose weight.   compliant with medication  Patient tolerated lipitor well without side effects. I feel the benefits of the medication outweigh the risks.    Is not getting adequate diet and exercise  Goals developed at last visit were met   Follow up in 6  months  Care management needs are self-addressed. Self-management abilities addressed and patient is capable of managing his own disease.

## 2022-02-15 NOTE — ASSESSMENT & PLAN NOTE
Worsening; Will start Cialis 5mg- start 4 pills on day 1 then 1 daily. Patient states that they have started watching a Podcast recently.

## 2022-02-15 NOTE — ASSESSMENT & PLAN NOTE
Improved, Hgb a1c 5.9 down from 6.1  Encouraged to watch sugar intake, exercise more and lose weight.   compliant with medication. Patient tolerated Metformin well without side effects. I feel the benefits of the medication outweigh the risks.    Not monitoring sugar at home.   Follow up in 6 months  Care management needs are self-addressed.   Self-management abilities addressed and patient is capable of managing his/her own disease.

## 2022-02-16 DIAGNOSIS — E78.2 MIXED HYPERLIPIDEMIA: ICD-10-CM

## 2022-02-16 RX ORDER — ATORVASTATIN CALCIUM 20 MG/1
40 TABLET, FILM COATED ORAL EVERY EVENING
Qty: 60 TABLET | Refills: 5 | Status: SHIPPED | OUTPATIENT
Start: 2022-02-16 | End: 2022-09-12 | Stop reason: SDUPTHER

## 2022-06-21 NOTE — PROGRESS NOTES
Medical Examination    Subjective   Jeffrey Aviles JR is a 66 y.o. male who presents today for a  fitness determination physical exam. The patient reports no problems.  The following portions of the patient's history were reviewed and updated as appropriate: allergies, current medications, past family history, past medical history, past social history, past surgical history and problem list.  Review of Systems  A comprehensive review of systems was negative.    Objective    Vision:  No exam data present    Applicant can recognize and distinguish among traffic control signals and devices showing standard red, green, and miryam colors.  Applicant has peripheral vision to 90 degrees in each eye.    Applicant meets visual acuity requirement only when wearing corrective lenses.    Monocular Vision?: No      Hearing:  Applicant can distinguish forced whisper at a distance of 5 feet with both ears.         Physical Exam  Constitutional:       Appearance: He is well-developed.   HENT:      Head: Normocephalic and atraumatic.      Right Ear: External ear normal.      Left Ear: External ear normal.      Nose: Nose normal.   Eyes:      Pupils: Pupils are equal, round, and reactive to light.   Cardiovascular:      Rate and Rhythm: Normal rate and regular rhythm.      Heart sounds: Normal heart sounds.   Pulmonary:      Effort: Pulmonary effort is normal.      Breath sounds: Normal breath sounds.   Abdominal:      General: Bowel sounds are normal.      Palpations: Abdomen is soft.   Musculoskeletal:         General: Normal range of motion.      Cervical back: Normal range of motion and neck supple.   Skin:     General: Skin is warm and dry.   Neurological:      Mental Status: He is alert and oriented to person, place, and time.   Psychiatric:         Behavior: Behavior normal.         Thought Content: Thought content normal.         Judgment: Judgment normal.          Labs:  Lab Results    Component Value Date    SPECGRAV 1.030 06/23/2022    BILIRUBINUR Small (1+) (A) 06/23/2022    GLUCOSEU Negative 04/12/2021       Assessment & Plan   Healthy male exam.   Meets standards in 49 .41;  qualifies for 2 year certificate.     Medical examiners certificate completed and printed.  Return as needed.

## 2022-06-22 DIAGNOSIS — E11.43 TYPE 2 DIABETES MELLITUS WITH DIABETIC AUTONOMIC NEUROPATHY, WITHOUT LONG-TERM CURRENT USE OF INSULIN: ICD-10-CM

## 2022-06-23 ENCOUNTER — CLINICAL SUPPORT (OUTPATIENT)
Dept: FAMILY MEDICINE CLINIC | Facility: CLINIC | Age: 67
End: 2022-06-23

## 2022-06-23 VITALS
BODY MASS INDEX: 31.35 KG/M2 | HEART RATE: 67 BPM | TEMPERATURE: 97.3 F | RESPIRATION RATE: 18 BRPM | OXYGEN SATURATION: 99 % | DIASTOLIC BLOOD PRESSURE: 66 MMHG | SYSTOLIC BLOOD PRESSURE: 134 MMHG | HEIGHT: 70 IN | WEIGHT: 219 LBS

## 2022-06-23 DIAGNOSIS — Z02.4 ENCOUNTER FOR CDL (COMMERCIAL DRIVING LICENSE) EXAM: Primary | ICD-10-CM

## 2022-06-23 LAB
BILIRUB BLD-MCNC: ABNORMAL MG/DL
CLARITY, POC: CLEAR
COLOR UR: YELLOW
EXPIRATION DATE: ABNORMAL
GLUCOSE UR STRIP-MCNC: NEGATIVE MG/DL
KETONES UR QL: NEGATIVE
LEUKOCYTE EST, POC: NEGATIVE
Lab: ABNORMAL
NITRITE UR-MCNC: NEGATIVE MG/ML
PH UR: 6 [PH] (ref 5–8)
PROT UR STRIP-MCNC: ABNORMAL MG/DL
RBC # UR STRIP: NEGATIVE /UL
SP GR UR: 1.03 (ref 1–1.03)
UROBILINOGEN UR QL: NORMAL

## 2022-06-23 PROCEDURE — DOTPHY: Performed by: FAMILY MEDICINE

## 2022-06-23 PROCEDURE — 81003 URINALYSIS AUTO W/O SCOPE: CPT | Performed by: FAMILY MEDICINE

## 2022-08-15 ENCOUNTER — OFFICE VISIT (OUTPATIENT)
Dept: FAMILY MEDICINE CLINIC | Facility: CLINIC | Age: 67
End: 2022-08-15

## 2022-08-15 ENCOUNTER — HOSPITAL ENCOUNTER (OUTPATIENT)
Dept: GENERAL RADIOLOGY | Facility: HOSPITAL | Age: 67
Discharge: HOME | End: 2022-08-15
Admitting: FAMILY MEDICINE

## 2022-08-15 VITALS
RESPIRATION RATE: 16 BRPM | TEMPERATURE: 98.9 F | HEART RATE: 61 BPM | WEIGHT: 216.2 LBS | OXYGEN SATURATION: 97 % | SYSTOLIC BLOOD PRESSURE: 124 MMHG | BODY MASS INDEX: 30.95 KG/M2 | HEIGHT: 70 IN | DIASTOLIC BLOOD PRESSURE: 62 MMHG

## 2022-08-15 DIAGNOSIS — M79.10 MYALGIA: ICD-10-CM

## 2022-08-15 DIAGNOSIS — Z12.5 SCREENING PSA (PROSTATE SPECIFIC ANTIGEN): ICD-10-CM

## 2022-08-15 DIAGNOSIS — R91.1 PULMONARY NODULE: Primary | ICD-10-CM

## 2022-08-15 DIAGNOSIS — E11.43 TYPE 2 DIABETES MELLITUS WITH DIABETIC AUTONOMIC NEUROPATHY, WITHOUT LONG-TERM CURRENT USE OF INSULIN: ICD-10-CM

## 2022-08-15 DIAGNOSIS — M54.50 LUMBAR BACK PAIN: ICD-10-CM

## 2022-08-15 DIAGNOSIS — R35.1 NOCTURIA: ICD-10-CM

## 2022-08-15 DIAGNOSIS — J45.20 MILD INTERMITTENT ASTHMA WITHOUT COMPLICATION: ICD-10-CM

## 2022-08-15 DIAGNOSIS — E78.2 MIXED HYPERLIPIDEMIA: ICD-10-CM

## 2022-08-15 PROCEDURE — 99214 OFFICE O/P EST MOD 30 MIN: CPT | Performed by: FAMILY MEDICINE

## 2022-08-15 PROCEDURE — 72100 X-RAY EXAM L-S SPINE 2/3 VWS: CPT

## 2022-08-15 RX ORDER — MOMETASONE FUROATE 100 UG/1
AEROSOL RESPIRATORY (INHALATION)
COMMUNITY
Start: 2022-06-27 | End: 2022-09-12 | Stop reason: SDUPTHER

## 2022-08-15 NOTE — ASSESSMENT & PLAN NOTE
New diagnosis:  CT chest w/o contrast ordered.  Pending Pre-cert.   Will discuss results after completion if Pre-cert approved.

## 2022-08-15 NOTE — ASSESSMENT & PLAN NOTE
A1c, Micro albumin urine ordered, will discuss results at next visit.  Monofilament foot exam done (WNL).    Last eye exam 02/2022, followed with Dr. Castellanos of [facility].  Encourged to watch sugar intake, exercise more, lose weight.  Patient tolerated Metformin 500 mg well without side effects. I feel the benefits of the medication outweigh the risks.

## 2022-08-15 NOTE — PROGRESS NOTES
Subjective   Jeffrey Aviles JR is a 66 y.o. male.   Chief Complaint   Patient presents with   • Pulmonary nodule   • Back Pain     Lumbar     • Diabetes   • Asthma     Pulmonary Nodule:  Pt presents today to discuss the need to repeat CT chest w/o contrast.    Back Pain  This is a recurrent problem. The current episode started more than 1 year ago. The problem occurs intermittently. The problem has been waxing and waning since onset. The pain is present in the lumbar spine. The quality of the pain is described as aching. Radiates to: left hip. The pain is at a severity of 7/10. The pain is moderate. The pain is the same all the time. The symptoms are aggravated by bending, position, sitting, standing, twisting and lying down. Pertinent negatives include no chest pain, numbness or tingling. Treatments tried: Ibuprofen 800 mg, stretching, Icy hot. The treatment provided moderate relief.   Diabetes  He presents for his follow-up diabetic visit. He has type 2 diabetes mellitus. No MedicAlert identification noted. His disease course has been stable. Hypoglycemia symptoms include tremors. There are no diabetic associated symptoms. Pertinent negatives for diabetes include no chest pain, no fatigue, no polyphagia and no polyuria. There are no hypoglycemic complications. Symptoms are stable. Diabetic complications include peripheral neuropathy. Risk factors for coronary artery disease include diabetes mellitus, family history and male sex. Current diabetic treatment includes oral agent (monotherapy). He is compliant with treatment all of the time. He is following a generally healthy diet. Meal planning includes carbohydrate counting and avoidance of concentrated sweets. He has not had a previous visit with a dietitian. He participates in exercise daily. His home blood glucose trend is fluctuating minimally. (Pt checks blood sugar when blood sugar is getting low) An ACE inhibitor/angiotensin II receptor blocker is not being  taken. He does not see a podiatrist.Eye exam is current (Dr. Castellanos).   Asthma  There is no shortness of breath. This is a chronic problem. The current episode started more than 1 year ago. The problem occurs rarely. The problem has been waxing and waning. Pertinent negatives include no chest pain. His symptoms are aggravated by nothing. Relieved by: Asmanex, Albuterol inhalers. He reports significant improvement on treatment. Risk factors for lung disease include occupational exposure. His past medical history is significant for asthma and pneumonia.        The following portions of the patient's history were reviewed and updated as appropriate: allergies, current medications, past family history, past medical history, past social history, past surgical history and problem list.    Past Medical History:   Diagnosis Date   • Abnormal chest CT    • Bilateral hearing loss    • History of asbestos exposure    • Mixed hyperlipidemia        Past Surgical History:   Procedure Laterality Date   • COLONOSCOPY  2016    Repeat    • PAROTID DUCT LIGATION          Family History   Problem Relation Age of Onset   • Cerebral aneurysm Mother          at 42   • Hypertension Mother    • Hyperlipidemia Father    • Hypertension Father    • Other Father         spinal stenosis   • Asthma Father         living at 86   • Prostate cancer Father    • Cancer Father         Prostate cancer  at 87   • Breast cancer Sister    • Hyperlipidemia Sister    • Prostate cancer Maternal Grandfather    • Diabetes Maternal Uncle    • Heart disease Maternal Uncle    • Lung cancer Maternal Uncle    • Hypertension Brother    • Hyperlipidemia Brother    • Other Brother 30        Hepatitis   • Stroke Maternal Grandmother         Social History     Socioeconomic History   • Marital status:    Tobacco Use   • Smoking status: Never Smoker   • Smokeless tobacco: Never Used   Substance and Sexual Activity   • Alcohol use: No   • Drug  "use: No         Review of Systems   Constitutional: Negative for fatigue.   HENT: Negative for hearing loss.    Eyes: Negative for visual disturbance.   Respiratory: Negative for shortness of breath.         Pulmonary nodule, Asthma   Cardiovascular: Negative for chest pain and palpitations.   Endocrine: Negative for polyphagia and polyuria.   Genitourinary: Negative for frequency.        Nocturia   Musculoskeletal: Positive for back pain (Lumbar). Negative for joint swelling.   Skin: Negative for skin lesions.   Neurological: Positive for tremors. Negative for tingling, syncope, numbness and memory problem.       Objective   Visit Vitals  /62 (BP Location: Right arm, Patient Position: Sitting, Cuff Size: Large Adult)   Pulse 61   Temp 98.9 °F (37.2 °C) (Skin)   Resp 16   Ht 176.5 cm (69.5\")   Wt 98.1 kg (216 lb 3.2 oz)   SpO2 97%   BMI 31.47 kg/m²     Physical Exam  Vitals and nursing note reviewed.   Constitutional:       Appearance: He is well-developed.   HENT:      Head: Normocephalic.   Neck:      Thyroid: No thyromegaly.      Vascular: No carotid bruit.      Trachea: Trachea normal.   Cardiovascular:      Rate and Rhythm: Normal rate and regular rhythm.      Heart sounds: No murmur heard.    No friction rub. No gallop.   Pulmonary:      Effort: Pulmonary effort is normal. No respiratory distress.      Breath sounds: Normal breath sounds. No wheezing.   Chest:      Chest wall: No tenderness.   Musculoskeletal:      Cervical back: Neck supple.   Feet:      Right foot:      Protective Sensation: 10 sites tested. 10 sites sensed.      Left foot:      Protective Sensation: 10 sites tested. 10 sites sensed.   Skin:     General: Skin is dry.      Findings: No rash.      Nails: There is no clubbing.   Neurological:      Mental Status: He is alert and oriented to person, place, and time.   Psychiatric:         Behavior: Behavior is cooperative.         Assessment & Plan   Problem List Items Addressed This Visit  "       Medium    Mixed hyperlipidemia    Current Assessment & Plan     CMP, Lipid ordered, will discuss results at next visit.         Relevant Orders    Lipid Panel With / Chol / HDL Ratio (Completed)    Comprehensive metabolic panel (Completed)    Type 2 diabetes mellitus with diabetic autonomic neuropathy, without long-term current use of insulin (HCC)    Current Assessment & Plan     A1c, Micro albumin urine ordered, will discuss results at next visit.  Monofilament foot exam done (WNL).    Last eye exam 02/2022, followed with Dr. Castellanos of [facility].  Encourged to watch sugar intake, exercise more, lose weight.  Patient tolerated Metformin 500 mg well without side effects. I feel the benefits of the medication outweigh the risks.              Relevant Orders    Hemoglobin A1c (Completed)    Microalbumin / Creatinine Urine Ratio - Urine, Clean Catch (Completed)       Low    Mild intermittent asthma without complication    Current Assessment & Plan     Stable.  Followed with [facility].  They administer PFT yearly.  Advised pt to have them send records for our continuation of care.  Patient tolerated Albuterol Sulfate HFA  108 Inhaler, Asmanex  mcg Inhaler well without side effects. I feel the benefits of the medication outweigh the risks.           Relevant Medications    Asmanex  MCG/ACT aerosol       Unprioritized    Lumbar back pain    Current Assessment & Plan     Lumbar x-ray ordered, will discuss results at next visit.  Continue ibuprofen and Flexeril.  Benefits drugs outweigh the risk.  Patient will be given back understanding.         Relevant Orders    XR Spine Lumbar 2 or 3 View (Completed)    Myalgia    Current Assessment & Plan     CPK ordered, will discuss results at next visit.         Relevant Orders    CK (Completed)    Nocturia    Current Assessment & Plan     UA ordered, will discuss results at next visit.         Relevant Orders    Urinalysis With Culture If Indicated  - (Completed)    Pulmonary nodule - Primary    Overview     Abnormal CT chest 02/13/2022         Current Assessment & Plan     New diagnosis:  CT chest w/o contrast ordered.  Pending Pre-cert.   Will discuss results after completion if Pre-cert approved.         Relevant Orders    CT Chest Without Contrast    Screening PSA (prostate specific antigen)    Current Assessment & Plan     PSA ordered, will discuss results at next visit.         Relevant Orders    PSA SCREENING (Completed)

## 2022-08-15 NOTE — ASSESSMENT & PLAN NOTE
Stable.  Followed with [facility].  They administer PFT yearly.  Advised pt to have them send records for our continuation of care.  Patient tolerated Albuterol Sulfate HFA  108 Inhaler, Asmanex  mcg Inhaler well without side effects. I feel the benefits of the medication outweigh the risks.

## 2022-08-15 NOTE — ASSESSMENT & PLAN NOTE
Lumbar x-ray ordered, will discuss results at next visit.  Continue ibuprofen and Flexeril.  Benefits drugs outweigh the risk.  Patient will be given back understanding.

## 2022-08-16 LAB
ALBUMIN SERPL-MCNC: 4.7 G/DL (ref 3.8–4.8)
ALBUMIN/CREAT UR: <10 MG/G CREAT (ref 0–29)
ALBUMIN/GLOB SERPL: 1.7 {RATIO} (ref 1.2–2.2)
ALP SERPL-CCNC: 70 IU/L (ref 44–121)
ALT SERPL-CCNC: 20 IU/L (ref 0–44)
APPEARANCE UR: CLEAR
AST SERPL-CCNC: 18 IU/L (ref 0–40)
BACTERIA #/AREA URNS HPF: NORMAL /[HPF]
BILIRUB SERPL-MCNC: 0.9 MG/DL (ref 0–1.2)
BILIRUB UR QL STRIP: NEGATIVE
BUN SERPL-MCNC: 15 MG/DL (ref 8–27)
BUN/CREAT SERPL: 19 (ref 10–24)
CALCIUM SERPL-MCNC: 9.2 MG/DL (ref 8.6–10.2)
CASTS URNS QL MICRO: NORMAL /LPF
CHLORIDE SERPL-SCNC: 103 MMOL/L (ref 96–106)
CHOLEST SERPL-MCNC: 134 MG/DL (ref 100–199)
CHOLEST/HDLC SERPL: 3 RATIO (ref 0–5)
CK SERPL-CCNC: 64 U/L (ref 41–331)
CO2 SERPL-SCNC: 23 MMOL/L (ref 20–29)
COLOR UR: YELLOW
CREAT SERPL-MCNC: 0.79 MG/DL (ref 0.76–1.27)
CREAT UR-MCNC: 30.8 MG/DL
EGFRCR-CYS SERPLBLD CKD-EPI 2021: 99 ML/MIN/1.73
EPI CELLS #/AREA URNS HPF: NORMAL /HPF (ref 0–10)
GLOBULIN SER CALC-MCNC: 2.7 G/DL (ref 1.5–4.5)
GLUCOSE SERPL-MCNC: 110 MG/DL (ref 65–99)
GLUCOSE UR QL STRIP: NEGATIVE
HBA1C MFR BLD: 6.3 % (ref 4.8–5.6)
HDLC SERPL-MCNC: 44 MG/DL
HGB UR QL STRIP: NEGATIVE
KETONES UR QL STRIP: NEGATIVE
LDLC SERPL CALC-MCNC: 69 MG/DL (ref 0–99)
LEUKOCYTE ESTERASE UR QL STRIP: NEGATIVE
MICRO URNS: NORMAL
MICRO URNS: NORMAL
MICROALBUMIN UR-MCNC: <3 UG/ML
NITRITE UR QL STRIP: NEGATIVE
PH UR STRIP: 6.5 [PH] (ref 5–7.5)
POTASSIUM SERPL-SCNC: 5 MMOL/L (ref 3.5–5.2)
PROT SERPL-MCNC: 7.4 G/DL (ref 6–8.5)
PROT UR QL STRIP: NEGATIVE
PSA SERPL-MCNC: 0.8 NG/ML (ref 0–4)
RBC #/AREA URNS HPF: NORMAL /HPF (ref 0–2)
SODIUM SERPL-SCNC: 141 MMOL/L (ref 134–144)
SP GR UR STRIP: 1.01 (ref 1–1.03)
TRIGL SERPL-MCNC: 119 MG/DL (ref 0–149)
URINALYSIS REFLEX: NORMAL
UROBILINOGEN UR STRIP-MCNC: 0.2 MG/DL (ref 0.2–1)
VLDLC SERPL CALC-MCNC: 21 MG/DL (ref 5–40)
WBC #/AREA URNS HPF: NORMAL /HPF (ref 0–5)

## 2022-08-23 ENCOUNTER — APPOINTMENT (OUTPATIENT)
Dept: CT IMAGING | Facility: HOSPITAL | Age: 67
End: 2022-08-23

## 2022-09-01 DIAGNOSIS — E11.43 TYPE 2 DIABETES MELLITUS WITH DIABETIC AUTONOMIC NEUROPATHY, WITHOUT LONG-TERM CURRENT USE OF INSULIN: Primary | ICD-10-CM

## 2022-09-02 ENCOUNTER — HOSPITAL ENCOUNTER (OUTPATIENT)
Dept: CT IMAGING | Facility: HOSPITAL | Age: 67
Discharge: HOME | End: 2022-09-02
Admitting: FAMILY MEDICINE

## 2022-09-02 DIAGNOSIS — R91.1 PULMONARY NODULE: ICD-10-CM

## 2022-09-02 PROCEDURE — 71250 CT THORAX DX C-: CPT

## 2022-09-08 ENCOUNTER — TELEPHONE (OUTPATIENT)
Dept: FAMILY MEDICINE CLINIC | Facility: CLINIC | Age: 67
End: 2022-09-08

## 2022-09-08 NOTE — TELEPHONE ENCOUNTER
Caller: Jeffrey Aviles JR    Relationship: Self    Best call back number: [phone]     Caller requesting test results: JEFFREY AVILES    What test was performed: X-RAYS    When was the test performed: 09/02/22    Where was the test performed: [facility]    Additional notes:PATIENT WOULD LIKE A CALL BACK WITH THE RESULTS OF C-RAYS DONE ON 09/02/22.    PLEASE ADVISE.

## 2022-09-12 ENCOUNTER — OFFICE VISIT (OUTPATIENT)
Dept: FAMILY MEDICINE CLINIC | Facility: CLINIC | Age: 67
End: 2022-09-12

## 2022-09-12 VITALS
OXYGEN SATURATION: 98 % | HEART RATE: 70 BPM | BODY MASS INDEX: 31.07 KG/M2 | TEMPERATURE: 97.5 F | HEIGHT: 70 IN | WEIGHT: 217 LBS | DIASTOLIC BLOOD PRESSURE: 80 MMHG | RESPIRATION RATE: 18 BRPM | SYSTOLIC BLOOD PRESSURE: 122 MMHG

## 2022-09-12 DIAGNOSIS — M62.830 PARASPINAL MUSCLE SPASM: ICD-10-CM

## 2022-09-12 DIAGNOSIS — E78.2 MIXED HYPERLIPIDEMIA: Primary | ICD-10-CM

## 2022-09-12 DIAGNOSIS — Z12.5 SCREENING PSA (PROSTATE SPECIFIC ANTIGEN): ICD-10-CM

## 2022-09-12 DIAGNOSIS — M53.9 MULTILEVEL DEGENERATIVE DISC DISEASE: ICD-10-CM

## 2022-09-12 DIAGNOSIS — J44.9 ASTHMATIC BRONCHITIS , CHRONIC: ICD-10-CM

## 2022-09-12 DIAGNOSIS — M54.50 LUMBAR BACK PAIN: ICD-10-CM

## 2022-09-12 DIAGNOSIS — J45.20 MILD INTERMITTENT ASTHMA WITHOUT COMPLICATION: ICD-10-CM

## 2022-09-12 DIAGNOSIS — R35.1 NOCTURIA: ICD-10-CM

## 2022-09-12 DIAGNOSIS — M54.50 MIDLINE LOW BACK PAIN WITHOUT SCIATICA, UNSPECIFIED CHRONICITY: ICD-10-CM

## 2022-09-12 DIAGNOSIS — M12.9 ARTHRITIS, MULTIPLE JOINT INVOLVEMENT: ICD-10-CM

## 2022-09-12 DIAGNOSIS — J12.82 PNEUMONIA DUE TO COVID-19 VIRUS: ICD-10-CM

## 2022-09-12 DIAGNOSIS — E66.3 OVERWEIGHT: ICD-10-CM

## 2022-09-12 DIAGNOSIS — N52.9 ERECTILE DYSFUNCTION, UNSPECIFIED ERECTILE DYSFUNCTION TYPE: ICD-10-CM

## 2022-09-12 DIAGNOSIS — M50.20 DISPLACEMENT OF CERVICAL INTERVERTEBRAL DISC: ICD-10-CM

## 2022-09-12 DIAGNOSIS — R93.89 ABNORMAL CHEST CT: ICD-10-CM

## 2022-09-12 DIAGNOSIS — Z77.090 HISTORY OF ASBESTOS EXPOSURE: ICD-10-CM

## 2022-09-12 DIAGNOSIS — J66.8 AIRWAY DISEASE DUE TO OTHER SPECIFIC ORGANIC DUSTS: ICD-10-CM

## 2022-09-12 DIAGNOSIS — U07.1 PNEUMONIA DUE TO COVID-19 VIRUS: ICD-10-CM

## 2022-09-12 DIAGNOSIS — M79.10 MYALGIA: ICD-10-CM

## 2022-09-12 DIAGNOSIS — J30.1 CHRONIC ALLERGIC RHINITIS DUE TO POLLEN: ICD-10-CM

## 2022-09-12 DIAGNOSIS — R20.2 PARESTHESIA: ICD-10-CM

## 2022-09-12 DIAGNOSIS — H83.3X3 NOISE-INDUCED HEARING LOSS OF BOTH EARS: ICD-10-CM

## 2022-09-12 DIAGNOSIS — Z83.71 FAMILY HISTORY OF POLYPS IN THE COLON: ICD-10-CM

## 2022-09-12 DIAGNOSIS — E11.43 TYPE 2 DIABETES MELLITUS WITH DIABETIC AUTONOMIC NEUROPATHY, WITHOUT LONG-TERM CURRENT USE OF INSULIN: ICD-10-CM

## 2022-09-12 DIAGNOSIS — Z00.01 ENCOUNTER FOR GENERAL ADULT MEDICAL EXAMINATION WITH ABNORMAL FINDINGS: ICD-10-CM

## 2022-09-12 DIAGNOSIS — R91.1 PULMONARY NODULE: ICD-10-CM

## 2022-09-12 PROCEDURE — G0402 INITIAL PREVENTIVE EXAM: HCPCS | Performed by: FAMILY MEDICINE

## 2022-09-12 PROCEDURE — 99214 OFFICE O/P EST MOD 30 MIN: CPT | Performed by: FAMILY MEDICINE

## 2022-09-12 PROCEDURE — 1159F MED LIST DOCD IN RCRD: CPT | Performed by: FAMILY MEDICINE

## 2022-09-12 RX ORDER — IBUPROFEN 800 MG/1
800 TABLET ORAL EVERY 8 HOURS PRN
Qty: 90 TABLET | Refills: 5 | Status: SHIPPED | OUTPATIENT
Start: 2022-09-12

## 2022-09-12 RX ORDER — ATORVASTATIN CALCIUM 20 MG/1
20 TABLET, FILM COATED ORAL EVERY EVENING
Qty: 30 TABLET | Refills: 5 | Status: SHIPPED | OUTPATIENT
Start: 2022-09-12 | End: 2023-03-14 | Stop reason: SDUPTHER

## 2022-09-12 RX ORDER — CYCLOBENZAPRINE HCL 10 MG
10 TABLET ORAL
Qty: 30 TABLET | Refills: 5 | Status: SHIPPED | OUTPATIENT
Start: 2022-09-12

## 2022-09-12 RX ORDER — FLUTICASONE PROPIONATE 50 MCG
2 SPRAY, SUSPENSION (ML) NASAL DAILY
Qty: 16 G | Refills: 3
Start: 2022-09-12

## 2022-09-12 RX ORDER — CYCLOBENZAPRINE HCL 10 MG
10 TABLET ORAL
Qty: 30 TABLET | Refills: 5 | Status: SHIPPED | OUTPATIENT
Start: 2022-09-12 | End: 2022-09-12 | Stop reason: SDUPTHER

## 2022-09-12 RX ORDER — ALBUTEROL SULFATE 90 UG/1
2 AEROSOL, METERED RESPIRATORY (INHALATION) EVERY 4 HOURS PRN
Qty: 18 G | Refills: 5
Start: 2022-09-12

## 2022-09-12 RX ORDER — LORATADINE 10 MG/1
10 TABLET ORAL DAILY
Qty: 30 TABLET | Refills: 5
Start: 2022-09-12

## 2022-09-12 RX ORDER — MOMETASONE FUROATE 100 UG/1
1 AEROSOL RESPIRATORY (INHALATION) DAILY
Qty: 13 G | Refills: 0
Start: 2022-09-12

## 2022-09-12 RX ORDER — TADALAFIL 5 MG/1
5 TABLET ORAL DAILY PRN
Qty: 90 TABLET | Refills: 2
Start: 2022-09-12

## 2022-10-02 PROBLEM — J44.89 ASTHMATIC BRONCHITIS , CHRONIC: Status: ACTIVE | Noted: 2022-10-02

## 2022-10-02 PROBLEM — J44.9 ASTHMATIC BRONCHITIS , CHRONIC: Status: ACTIVE | Noted: 2022-10-02

## 2022-12-15 ENCOUNTER — OFFICE VISIT (OUTPATIENT)
Dept: FAMILY MEDICINE CLINIC | Facility: CLINIC | Age: 67
End: 2022-12-15

## 2022-12-15 VITALS
HEIGHT: 70 IN | TEMPERATURE: 97.3 F | DIASTOLIC BLOOD PRESSURE: 78 MMHG | WEIGHT: 226.6 LBS | HEART RATE: 57 BPM | OXYGEN SATURATION: 96 % | BODY MASS INDEX: 32.44 KG/M2 | SYSTOLIC BLOOD PRESSURE: 139 MMHG | RESPIRATION RATE: 15 BRPM

## 2022-12-15 DIAGNOSIS — E11.43 TYPE 2 DIABETES MELLITUS WITH DIABETIC AUTONOMIC NEUROPATHY, WITHOUT LONG-TERM CURRENT USE OF INSULIN: Primary | ICD-10-CM

## 2022-12-15 LAB
EXPIRATION DATE: NORMAL
HBA1C MFR BLD: 6 %
Lab: NORMAL

## 2022-12-15 PROCEDURE — 99213 OFFICE O/P EST LOW 20 MIN: CPT | Performed by: FAMILY MEDICINE

## 2022-12-15 PROCEDURE — 83036 HEMOGLOBIN GLYCOSYLATED A1C: CPT | Performed by: FAMILY MEDICINE

## 2022-12-15 PROCEDURE — 3044F HG A1C LEVEL LT 7.0%: CPT | Performed by: FAMILY MEDICINE

## 2022-12-15 NOTE — ASSESSMENT & PLAN NOTE
Improved, -- Hgb a1c 6.0 down from 6.3  Encouraged to watch sugar intake, exercise more and lose weight. compliant with medication. Patient tolerated metformin well without side effects. I feel the benefits of the medication outweigh the risks.    Not monitoring sugar at home.   Follow up in 3 months  Care management needs are self-addressed. Self-management abilities addressed and patient is capable of managing his own disease.

## 2022-12-15 NOTE — PROGRESS NOTES
Subjective   Jeffrey Aviles JR is a 67 y.o. male.     Diabetes  He presents for his follow-up diabetic visit. He has type 2 diabetes mellitus. His disease course has been stable. There are no hypoglycemic associated symptoms. There are no diabetic associated symptoms. Pertinent negatives for diabetes include no chest pain, no fatigue, no polyphagia, no polyuria and no weight loss. There are no hypoglycemic complications. There are no diabetic complications. Risk factors for coronary artery disease include diabetes mellitus and hypertension.        The following portions of the patient's history were reviewed and updated as appropriate: current medications, past family history, past medical history, past social history, past surgical history and problem list.    Family History   Problem Relation Age of Onset   • Cerebral aneurysm Mother          at 42   • Hypertension Mother    • Hyperlipidemia Father    • Hypertension Father    • Other Father         spinal stenosis   • Asthma Father         living at 86   • Prostate cancer Father    • Cancer Father         Prostate cancer  at 87   • Breast cancer Sister    • Hyperlipidemia Sister    • Prostate cancer Maternal Grandfather    • Diabetes Maternal Uncle    • Heart disease Maternal Uncle    • Lung cancer Maternal Uncle    • Hypertension Brother    • Hyperlipidemia Brother    • Other Brother 30        Hepatitis   • Stroke Maternal Grandmother        Social History     Tobacco Use   • Smoking status: Never   • Smokeless tobacco: Never   Substance Use Topics   • Alcohol use: No   • Drug use: No       Past Surgical History:   Procedure Laterality Date   • COLONOSCOPY  2016    Repeat -   • PAROTID DUCT LIGATION         Patient Active Problem List   Diagnosis   • Bilateral hearing loss   • Chronic allergic rhinitis due to pollen   • Displacement of cervical intervertebral disc   • History of asbestos exposure   • Mixed hyperlipidemia   • Overweight   •  Mild intermittent asthma without complication   • Type 2 diabetes mellitus with diabetic autonomic neuropathy, without long-term current use of insulin (HCC)   • Screening PSA (prostate specific antigen)   • Arthritis, multiple joint involvement   • Family history of polyps in the colon   • Myalgia   • Encounter for general adult medical examination with abnormal findings   • Pneumonia due to COVID-19 virus   • Erectile dysfunction   • Nocturia   • Pulmonary nodule   • Multilevel degenerative disc disease   • Paresthesia   • Asthmatic bronchitis , chronic (HCC)       Current Outpatient Medications on File Prior to Visit   Medication Sig Dispense Refill   • albuterol sulfate  (90 Base) MCG/ACT inhaler Inhale 2 puffs Every 4 (Four) Hours As Needed for Wheezing. 18 g 5   • Asmanex  MCG/ACT aerosol Inhale 1 inhaler Daily. 13 g 0   • aspirin 81 MG tablet Take 81 mg by mouth Daily.     • atorvastatin (LIPITOR) 20 MG tablet Take 1 tablet by mouth Every Evening. 30 tablet 5   • cyclobenzaprine (FLEXERIL) 10 MG tablet Take 1 tablet by mouth every night at bedtime. 30 tablet 5   • fluticasone (FLONASE) 50 MCG/ACT nasal spray 2 sprays into the nostril(s) as directed by provider Daily. 16 g 3   • glucose blood test strip Blood sugar 1 x daily 30 each 12   • ibuprofen (ADVIL,MOTRIN) 800 MG tablet Take 1 tablet by mouth Every 8 (Eight) Hours As Needed for Mild Pain. 90 tablet 5   • loratadine (CLARITIN) 10 MG tablet Take 1 tablet by mouth Daily. 30 tablet 5   • metFORMIN (GLUCOPHAGE) 1000 MG tablet Take 1 tablet by mouth 2 (Two) Times a Day. 180 tablet 5   • tadalafil (Cialis) 5 MG tablet Take 1 tablet by mouth Daily As Needed for Erectile Dysfunction. Take 4 pills on 1st day then 1 daily 90 tablet 2     No current facility-administered medications on file prior to visit.       No Known Allergies    Review of Systems   Constitutional: Negative for fatigue, unexpected weight gain and unexpected weight loss.   Eyes:  "Negative for visual disturbance.   Respiratory: Negative for shortness of breath.    Cardiovascular: Negative for chest pain, palpitations and leg swelling.   Endocrine: Negative for polyphagia and polyuria.   Genitourinary: Negative for frequency.   Skin: Negative for skin lesions.   Neurological: Negative for syncope, numbness and headache.       Objective   Visit Vitals  /78 (BP Location: Right arm, Patient Position: Sitting, Cuff Size: Adult)   Pulse 57   Temp 97.3 °F (36.3 °C)   Resp 15   Ht 177.8 cm (70\")   Wt 103 kg (226 lb 9.6 oz)   SpO2 96%   BMI 32.51 kg/m²     Physical Exam  Vitals and nursing note reviewed.   Constitutional:       Appearance: He is well-developed.   HENT:      Head: Normocephalic.   Neck:      Thyroid: No thyromegaly.      Vascular: No carotid bruit.      Trachea: Trachea normal.   Cardiovascular:      Rate and Rhythm: Normal rate and regular rhythm.      Heart sounds: No murmur heard.    No friction rub. No gallop.   Pulmonary:      Effort: Pulmonary effort is normal. No respiratory distress.      Breath sounds: Normal breath sounds. No wheezing.   Chest:      Chest wall: No tenderness.   Musculoskeletal:      Cervical back: Neck supple.   Skin:     General: Skin is dry.      Findings: No rash.      Nails: There is no clubbing.   Neurological:      Mental Status: He is alert and oriented to person, place, and time.   Psychiatric:         Behavior: Behavior is cooperative.           Assessment & Plan .  Problem List Items Addressed This Visit        Medium    Type 2 diabetes mellitus with diabetic autonomic neuropathy, without long-term current use of insulin ([facility]) - Primary    Current Assessment & Plan     Improved, -- Hgb a1c 6.0 down from 6.3  Encouraged to watch sugar intake, exercise more and lose weight. compliant with medication. Patient tolerated metformin well without side effects. I feel the benefits of the medication outweigh the risks.    Not monitoring sugar at home. "   Follow up in 3 months  Care management needs are self-addressed. Self-management abilities addressed and patient is capable of managing his own disease.

## 2022-12-29 ENCOUNTER — TELEPHONE (OUTPATIENT)
Dept: FAMILY MEDICINE CLINIC | Facility: CLINIC | Age: 67
End: 2022-12-29

## 2022-12-29 NOTE — TELEPHONE ENCOUNTER
LMOM that Dr. Keen stated they can make their own appt. To see that podiatrist that they do not need a referral.

## 2022-12-29 NOTE — TELEPHONE ENCOUNTER
Caller: NOAH ARELLANO    Relationship: Emergency Contact    Best call back number: [phone]    What is the medical concern/diagnosis: DIABETES AND FOOT CARE    What specialty or service is being requested: PODIATRIST    What is the provider, practice or medical service name: [facility]     What is the office location: [place]

## 2023-02-28 ENCOUNTER — TELEPHONE (OUTPATIENT)
Dept: FAMILY MEDICINE CLINIC | Facility: CLINIC | Age: 68
End: 2023-02-28
Payer: MEDICARE

## 2023-02-28 NOTE — TELEPHONE ENCOUNTER
Caller: NOAH ARELLANO    Relationship: Emergency Contact    Best call back number: [phone]    What was the call regarding: PATIENT'S WIFE STATED THAT PAPERWORK WAS FAXED ON 02/14/2023 TO DR JACINTO THAT NEEDED TO BE COMPLETED FOR PATIENT'S DIABETIC SHOES. PATIENT'S WIFE STATED THAT THEY HAVE NOT RECEIVED THE PAPERWORK BACK AND SHE WAS CALLING TO CHECK ON THIS. PLEASE ADVISE.    Do you require a callback: YES

## 2023-03-08 ENCOUNTER — TELEPHONE (OUTPATIENT)
Dept: FAMILY MEDICINE CLINIC | Facility: CLINIC | Age: 68
End: 2023-03-08

## 2023-03-08 NOTE — TELEPHONE ENCOUNTER
Caller: NOAH ARELLANO    Relationship: Emergency Contact    Best call back number: [phone]    What was the call regarding: PATIENT'S WIFE STATED THAT [facility] HAS NOT RECEIVED ANYTHING BACK FROM DR JACINTO REGARDING PATIENT'S DIABETIC SHOES. [facility] FAXED PAPERWORK. PLEASE ADVISE.     Do you require a callback: YES

## 2023-03-10 LAB
ALBUMIN SERPL-MCNC: 4.6 G/DL (ref 3.8–4.8)
ALBUMIN/GLOB SERPL: 1.8 {RATIO} (ref 1.2–2.2)
ALP SERPL-CCNC: 62 IU/L (ref 44–121)
ALT SERPL-CCNC: 27 IU/L (ref 0–44)
AST SERPL-CCNC: 19 IU/L (ref 0–40)
BILIRUB SERPL-MCNC: 0.5 MG/DL (ref 0–1.2)
BUN SERPL-MCNC: 17 MG/DL (ref 8–27)
BUN/CREAT SERPL: 21 (ref 10–24)
CALCIUM SERPL-MCNC: 8.8 MG/DL (ref 8.6–10.2)
CHLORIDE SERPL-SCNC: 102 MMOL/L (ref 96–106)
CHOLEST SERPL-MCNC: 162 MG/DL (ref 100–199)
CHOLEST/HDLC SERPL: 4.3 RATIO (ref 0–5)
CO2 SERPL-SCNC: 26 MMOL/L (ref 20–29)
CREAT SERPL-MCNC: 0.8 MG/DL (ref 0.76–1.27)
EGFRCR SERPLBLD CKD-EPI 2021: 98 ML/MIN/1.73
GLOBULIN SER CALC-MCNC: 2.6 G/DL (ref 1.5–4.5)
GLUCOSE SERPL-MCNC: 135 MG/DL (ref 70–99)
HBA1C MFR BLD: 6.7 % (ref 4.8–5.6)
HDLC SERPL-MCNC: 38 MG/DL
LDLC SERPL CALC-MCNC: 97 MG/DL (ref 0–99)
POTASSIUM SERPL-SCNC: 5.1 MMOL/L (ref 3.5–5.2)
PROT SERPL-MCNC: 7.2 G/DL (ref 6–8.5)
SODIUM SERPL-SCNC: 140 MMOL/L (ref 134–144)
TRIGL SERPL-MCNC: 155 MG/DL (ref 0–149)
VLDLC SERPL CALC-MCNC: 27 MG/DL (ref 5–40)

## 2023-03-14 ENCOUNTER — OFFICE VISIT (OUTPATIENT)
Dept: FAMILY MEDICINE CLINIC | Facility: CLINIC | Age: 68
End: 2023-03-14
Payer: MEDICARE

## 2023-03-14 VITALS
TEMPERATURE: 97.7 F | HEART RATE: 75 BPM | BODY MASS INDEX: 33.76 KG/M2 | HEIGHT: 70 IN | DIASTOLIC BLOOD PRESSURE: 80 MMHG | OXYGEN SATURATION: 95 % | SYSTOLIC BLOOD PRESSURE: 140 MMHG | RESPIRATION RATE: 18 BRPM | WEIGHT: 235.8 LBS

## 2023-03-14 DIAGNOSIS — E78.2 MIXED HYPERLIPIDEMIA: ICD-10-CM

## 2023-03-14 DIAGNOSIS — E11.43 TYPE 2 DIABETES MELLITUS WITH DIABETIC AUTONOMIC NEUROPATHY, WITHOUT LONG-TERM CURRENT USE OF INSULIN: Primary | ICD-10-CM

## 2023-03-14 DIAGNOSIS — R20.2 PARESTHESIA: ICD-10-CM

## 2023-03-14 DIAGNOSIS — E66.3 OVERWEIGHT: ICD-10-CM

## 2023-03-14 PROCEDURE — 3044F HG A1C LEVEL LT 7.0%: CPT | Performed by: FAMILY MEDICINE

## 2023-03-14 PROCEDURE — 99214 OFFICE O/P EST MOD 30 MIN: CPT | Performed by: FAMILY MEDICINE

## 2023-03-14 RX ORDER — LANOLIN ALCOHOL/MO/W.PET/CERES
1000 CREAM (GRAM) TOPICAL DAILY
COMMUNITY

## 2023-03-14 RX ORDER — ATORVASTATIN CALCIUM 20 MG/1
20 TABLET, FILM COATED ORAL EVERY EVENING
Qty: 30 TABLET | Refills: 5
Start: 2023-03-14 | End: 2023-03-15

## 2023-03-14 NOTE — PROGRESS NOTES
Subjective   Jeffrey Aviles JR is a 67 y.o. male.     Hyperlipidemia  This is a chronic problem. The current episode started more than 1 year ago. The problem is controlled. Recent lipid tests were reviewed and are high. Factors aggravating his hyperlipidemia include fatty foods. Pertinent negatives include no chest pain, myalgias or shortness of breath. Current antihyperlipidemic treatment includes statins. The current treatment provides no improvement of lipids. There are no compliance problems.  Risk factors for coronary artery disease include dyslipidemia, obesity and diabetes mellitus.   Diabetes  He presents for his follow-up diabetic visit. He has type 2 diabetes mellitus. His disease course has been worsening. There are no hypoglycemic associated symptoms. There are no diabetic associated symptoms. Pertinent negatives for diabetes include no chest pain, no fatigue, no polyphagia, no polyuria and no weight loss. There are no hypoglycemic complications. Risk factors for coronary artery disease include dyslipidemia and diabetes mellitus.        The following portions of the patient's history were reviewed and updated as appropriate: allergies, current medications, past family history, past medical history, past social history, past surgical history and problem list.    Family History   Problem Relation Age of Onset   • Cerebral aneurysm Mother          at 42   • Hypertension Mother    • Hyperlipidemia Father    • Hypertension Father    • Other Father         spinal stenosis   • Asthma Father         living at 86   • Prostate cancer Father    • Cancer Father         Prostate cancer  at 87   • Breast cancer Sister    • Hyperlipidemia Sister    • Prostate cancer Maternal Grandfather    • Diabetes Maternal Uncle    • Heart disease Maternal Uncle    • Lung cancer Maternal Uncle    • Hypertension Brother    • Hyperlipidemia Brother    • Other Brother 30        Hepatitis   • Stroke Maternal Grandmother         Social History     Tobacco Use   • Smoking status: Never   • Smokeless tobacco: Never   Substance Use Topics   • Alcohol use: No   • Drug use: No       Past Surgical History:   Procedure Laterality Date   • COLONOSCOPY  06/2016    Repeat 6-21-6-22   • PAROTID DUCT LIGATION         Patient Active Problem List   Diagnosis   • Bilateral hearing loss   • Chronic allergic rhinitis due to pollen   • Displacement of cervical intervertebral disc   • History of asbestos exposure   • Mixed hyperlipidemia   • Overweight   • Mild intermittent asthma without complication   • Type 2 diabetes mellitus with diabetic autonomic neuropathy, without long-term current use of insulin ([facility])   • Screening PSA (prostate specific antigen)   • Arthritis, multiple joint involvement   • Family history of polyps in the colon   • Myalgia   • Encounter for general adult medical examination with abnormal findings   • Pneumonia due to COVID-19 virus   • Erectile dysfunction   • Nocturia   • Pulmonary nodule   • Multilevel degenerative disc disease   • Paresthesia   • Asthmatic bronchitis , chronic (formerly [facility])       Current Outpatient Medications on File Prior to Visit   Medication Sig Dispense Refill   • albuterol sulfate  (90 Base) MCG/ACT inhaler Inhale 2 puffs Every 4 (Four) Hours As Needed for Wheezing. 18 g 5   • Asmanex  MCG/ACT aerosol Inhale 1 inhaler Daily. 13 g 0   • aspirin 81 MG tablet Take 1 tablet by mouth Daily.     • cyclobenzaprine (FLEXERIL) 10 MG tablet Take 1 tablet by mouth every night at bedtime. 30 tablet 5   • fluticasone (FLONASE) 50 MCG/ACT nasal spray 2 sprays into the nostril(s) as directed by provider Daily. 16 g 3   • ibuprofen (ADVIL,MOTRIN) 800 MG tablet Take 1 tablet by mouth Every 8 (Eight) Hours As Needed for Mild Pain. 90 tablet 5   • loratadine (CLARITIN) 10 MG tablet Take 1 tablet by mouth Daily. 30 tablet 5   • tadalafil (Cialis) 5 MG tablet Take 1 tablet by mouth Daily As Needed for Erectile  "Dysfunction. Take 4 pills on 1st day then 1 daily 90 tablet 2   • vitamin B-12 (CYANOCOBALAMIN) 1000 MCG tablet Take 1 tablet by mouth Daily.       No current facility-administered medications on file prior to visit.       No Known Allergies    Review of Systems   Constitutional: Negative for fatigue, unexpected weight gain and unexpected weight loss.   Eyes: Negative for visual disturbance.   Respiratory: Negative for shortness of breath.    Cardiovascular: Negative for chest pain, palpitations and leg swelling.   Gastrointestinal: Negative for nausea.   Endocrine: Negative for polyphagia and polyuria.   Genitourinary: Negative for frequency.   Musculoskeletal: Negative for myalgias.   Skin: Negative for dry skin and skin lesions.   Neurological: Negative for syncope, numbness and headache.       Objective   Visit Vitals  /80 (BP Location: Left arm, Patient Position: Sitting, Cuff Size: Large Adult)   Pulse 75   Temp 97.7 °F (36.5 °C) (Temporal)   Resp 18   Ht 177.8 cm (70\")   Wt 107 kg (235 lb 12.8 oz)   SpO2 95%   BMI 33.83 kg/m²     Physical Exam  Vitals and nursing note reviewed.   Constitutional:       Appearance: He is well-developed.   HENT:      Head: Normocephalic.   Neck:      Thyroid: No thyromegaly.      Vascular: No carotid bruit.      Trachea: Trachea normal.   Cardiovascular:      Rate and Rhythm: Normal rate and regular rhythm.      Heart sounds: No murmur heard.    No friction rub. No gallop.   Pulmonary:      Effort: Pulmonary effort is normal. No respiratory distress.      Breath sounds: Normal breath sounds. No wheezing.   Chest:      Chest wall: No tenderness.   Musculoskeletal:      Cervical back: Neck supple.   Skin:     General: Skin is dry.      Findings: No rash.      Nails: There is no clubbing.   Neurological:      Mental Status: He is alert and oriented to person, place, and time.   Psychiatric:         Behavior: Behavior is cooperative.           Assessment & Plan .  Problem List " Items Addressed This Visit        Medium    Mixed hyperlipidemia    Current Assessment & Plan     Worsening.  Trig. 155 up from 119, HDL 38 down from 44, lDL 87 up from 69, Tot. Chol. 162 up from 134  Encouraged to watch fatty intake, exercise more, and lose weight.   compliant with medication  Patient tolerated lovastatin well without side effects. I feel the benefits of the medication outweigh the risks.  Is not getting adequate diet and exercise  Goals developed at last visit were not met   Follow up in 3 months  Care management needs are self-addressed.. Self-management abilities addressed and patient is capable of managing his own disease.           Relevant Orders    Lipid Panel With / Chol / HDL Ratio    Comprehensive metabolic panel    Type 2 diabetes mellitus with diabetic autonomic neuropathy, without long-term current use of insulin (HCC) - Primary    Current Assessment & Plan     Worsening. Hgb a1c 6.7 up from 6.0  Encouraged to watch sugar intake, exercise more and lose weight.   compliant with medication. Patient tolerated metformin well without side effects. I feel the benefits of the medication outweigh the risks.  Discussed adding medication, pt. Declines at present.  Not monitoring sugar at home.  Advised to check blood sugar daily.    Follow up in 3 months.  Discussed with patient that he does not meet the criteria for Diabetic shoes.  Care management needs are self-addressed. . Self-management abilities addressed and patient is capable of managing his own disease.           Relevant Medications    glucose blood test strip    metFORMIN (GLUCOPHAGE) 1000 MG tablet    Other Relevant Orders    Hemoglobin A1c       Unprioritized    Overweight    Current Assessment & Plan     Patient's (Body mass index is 33.83 kg/m².) indicates that they are overweight with health conditions that include diabetes mellitus and dyslipidemias . Weight is worsening. BMI is is above average; BMI management plan is  completed. We discussed portion control and increasing exercise.          Paresthesia    Current Assessment & Plan     Bilateral feet, discussed starting Neurontin, pt. Declines stating that it causes no discomfort just is a funny feeling.    Pt. Stated that his podiatrist said that he needs diabetic shoes to cure his paresthesias, however discussed with patient that he does not meet the criteria for Diabetic shoes.

## 2023-03-14 NOTE — ASSESSMENT & PLAN NOTE
Worsening.  Trig. 155 up from 119, HDL 38 down from 44, lDL 87 up from 69, Tot. Chol. 162 up from 134  Encouraged to watch fatty intake, exercise more, and lose weight.   compliant with medication  Patient tolerated lovastatin well without side effects. I feel the benefits of the medication outweigh the risks.  Is not getting adequate diet and exercise  Goals developed at last visit were not met   Follow up in 3 months  Care management needs are self-addressed.. Self-management abilities addressed and patient is capable of managing his own disease.

## 2023-03-14 NOTE — ASSESSMENT & PLAN NOTE
Bilateral feet, discussed starting Neurontin, pt. Declines stating that it causes no discomfort just is a funny feeling.    Pt. Stated that his podiatrist said that he needs diabetic shoes to cure his paresthesias, however discussed with patient that he does not meet the criteria for Diabetic shoes.

## 2023-03-14 NOTE — ASSESSMENT & PLAN NOTE
Patient's (Body mass index is 33.83 kg/m².) indicates that they are overweight with health conditions that include diabetes mellitus and dyslipidemias . Weight is worsening. BMI is is above average; BMI management plan is completed. We discussed portion control and increasing exercise.

## 2023-03-14 NOTE — ASSESSMENT & PLAN NOTE
Worsening. Hgb a1c 6.7 up from 6.0  Encouraged to watch sugar intake, exercise more and lose weight.   compliant with medication. Patient tolerated metformin well without side effects. I feel the benefits of the medication outweigh the risks.  Discussed adding medication, pt. Declines at present.  Not monitoring sugar at home.  Advised to check blood sugar daily.    Follow up in 3 months.  Discussed with patient that he does not meet the criteria for Diabetic shoes.  Care management needs are self-addressed. . Self-management abilities addressed and patient is capable of managing his own disease.

## 2023-03-15 RX ORDER — ATORVASTATIN CALCIUM 20 MG/1
TABLET, FILM COATED ORAL
Qty: 90 TABLET | Refills: 1 | Status: SHIPPED | OUTPATIENT
Start: 2023-03-15

## 2023-05-01 ENCOUNTER — TELEPHONE (OUTPATIENT)
Dept: FAMILY MEDICINE CLINIC | Facility: CLINIC | Age: 68
End: 2023-05-01
Payer: MEDICARE

## 2023-05-01 ENCOUNTER — OFFICE VISIT (OUTPATIENT)
Dept: FAMILY MEDICINE CLINIC | Facility: CLINIC | Age: 68
End: 2023-05-01
Payer: MEDICARE

## 2023-05-01 VITALS
OXYGEN SATURATION: 97 % | BODY MASS INDEX: 32.33 KG/M2 | TEMPERATURE: 98.9 F | SYSTOLIC BLOOD PRESSURE: 132 MMHG | RESPIRATION RATE: 16 BRPM | WEIGHT: 225.8 LBS | HEART RATE: 70 BPM | DIASTOLIC BLOOD PRESSURE: 66 MMHG | HEIGHT: 70 IN

## 2023-05-01 DIAGNOSIS — J06.9 UPPER RESPIRATORY TRACT INFECTION, UNSPECIFIED TYPE: Primary | ICD-10-CM

## 2023-05-01 DIAGNOSIS — E66.3 OVERWEIGHT: ICD-10-CM

## 2023-05-01 RX ORDER — AMOXICILLIN AND CLAVULANATE POTASSIUM 875; 125 MG/1; MG/1
1 TABLET, FILM COATED ORAL 2 TIMES DAILY
Qty: 12 TABLET | Refills: 0 | Status: SHIPPED | OUTPATIENT
Start: 2023-05-01 | End: 2023-05-07

## 2023-05-01 NOTE — TELEPHONE ENCOUNTER
Called and spoke with wife- She said the foot doctor recommended injections.  I advised her to call him and ask him about this.

## 2023-05-01 NOTE — PATIENT INSTRUCTIONS
- daily flonase and over the counter antihistamine (Zyrtec/Cetirizine, non-drowsy allegra/Fexofenadine, claratin/loratadine) to help with nasal congestion and runny nose  - as needed pseudoephedrine for nasal congestion. Phenylephrine can also be used in its stead. Can use coricidin instead if you have high blood pressure  - OTC cough syrups to manage cough (dextromethorphan is the active ingredient)  - mucinex for chest congestion and/or thick mucus  - chloraseptic throat sprays for sore throat (over the counter), cough drops, hot tea with honey  - alternating tylenol and ibuprofen for body aches and headaches

## 2023-05-01 NOTE — TELEPHONE ENCOUNTER
Patient's spouse called to schedule a b-12 shot. She said it was recommended from a foot doctor in [place]. I wanted to double check that it can be scheduled. She requested to be called back at [phone].

## 2023-05-01 NOTE — PROGRESS NOTES
Subjective   Jeffrey Aviles JR is a 67 y.o. male.   Chief Complaint   Patient presents with   • URI       History of Present Illness       URI:  -Started:  2 weeks ago  -Pt has a medical history of Asthma, Pulmonary nodule, past exposure to asbestos  -Symptoms:  Productive Cough, rhinorrhea, slight headache, nasal congestion, chest congestion, mild fatigue, chest tightness  - denies: ear pain, throat pain (resolved), n/v, diarrhea, body aches, fevers, appetite changes  -Treatment:  OTC Mucinex with slight improvement    -Patient states that he gets pneumonia very easily  -Patient has allergies and gets allergy shots as well as uses Flonase    The following portions of the patient's history were reviewed and updated as appropriate: allergies, current medications, past family history, past medical history, past social history, past surgical history and problem list.    Patient Active Problem List   Diagnosis   • Bilateral hearing loss   • Chronic allergic rhinitis due to pollen   • Displacement of cervical intervertebral disc   • History of asbestos exposure   • Mixed hyperlipidemia   • Overweight   • Mild intermittent asthma without complication   • Type 2 diabetes mellitus with diabetic autonomic neuropathy, without long-term current use of insulin   • Screening PSA (prostate specific antigen)   • Arthritis, multiple joint involvement   • Family history of polyps in the colon   • Myalgia   • Encounter for general adult medical examination with abnormal findings   • Pneumonia due to COVID-19 virus   • Erectile dysfunction   • Nocturia   • Pulmonary nodule   • Multilevel degenerative disc disease   • Paresthesia   • Asthmatic bronchitis , chronic       Current Outpatient Medications on File Prior to Visit   Medication Sig Dispense Refill   • albuterol sulfate  (90 Base) MCG/ACT inhaler Inhale 2 puffs Every 4 (Four) Hours As Needed for Wheezing. 18 g 5   • Asmanex  MCG/ACT aerosol Inhale 1 inhaler Daily.  "13 g 0   • aspirin 81 MG tablet Take 1 tablet by mouth Daily.     • atorvastatin (LIPITOR) 20 MG tablet TAKE 1 TABLET BY MOUTH EVERY DAY IN THE EVENING 90 tablet 1   • cyclobenzaprine (FLEXERIL) 10 MG tablet Take 1 tablet by mouth every night at bedtime. 30 tablet 5   • fluticasone (FLONASE) 50 MCG/ACT nasal spray 2 sprays into the nostril(s) as directed by provider Daily. 16 g 3   • glucose blood test strip Blood sugar 1 x daily 100 each 12   • ibuprofen (ADVIL,MOTRIN) 800 MG tablet Take 1 tablet by mouth Every 8 (Eight) Hours As Needed for Mild Pain. 90 tablet 5   • loratadine (CLARITIN) 10 MG tablet Take 1 tablet by mouth Daily. 30 tablet 5   • metFORMIN (GLUCOPHAGE) 1000 MG tablet Take 1 tablet by mouth 2 (Two) Times a Day. 180 tablet 5   • tadalafil (Cialis) 5 MG tablet Take 1 tablet by mouth Daily As Needed for Erectile Dysfunction. Take 4 pills on 1st day then 1 daily 90 tablet 2   • vitamin B-12 (CYANOCOBALAMIN) 1000 MCG tablet Take 1 tablet by mouth Daily.       No current facility-administered medications on file prior to visit.     Current outpatient and discharge medications have been reconciled for the patient.  Reviewed by: Naina Rea, DO      No Known Allergies      Objective   Visit Vitals  /66 (BP Location: Right arm, Patient Position: Sitting, Cuff Size: Adult)   Pulse 70   Temp 98.9 °F (37.2 °C) (Skin)   Resp 16   Ht 177.8 cm (70\")   Wt 102 kg (225 lb 12.8 oz)   SpO2 97%   BMI 32.40 kg/m²       Physical Exam  HENT:      Head: Normocephalic.      Right Ear: Tympanic membrane normal.      Left Ear: Tympanic membrane normal.      Mouth/Throat:      Mouth: Mucous membranes are moist.      Pharynx: Oropharynx is clear. No oropharyngeal exudate or posterior oropharyngeal erythema.      Comments: - Tonsils were not swollen  Cardiovascular:      Rate and Rhythm: Normal rate and regular rhythm.      Comments: - Heart sounds are very faint  Pulmonary:      Effort: Pulmonary effort is normal.      " Breath sounds: No rhonchi or rales.      Comments: - CTA in all lung fields bilaterally  -Very mild wheeze in bilateral lower lungs            Diagnoses and all orders for this visit:    1. Upper respiratory tract infection, unspecified type (Primary)  -Discussed with patient that I think his symptoms are more closely related to allergies rather than an illness.  However patient states that he gets pneumonia very frequently, has intermittent mitten asthma and history of asbestos exposure and I want to make sure that he is not truly battling a bacterial infection  -  amoxicillin-clavulanate (Augmentin) 875-125 MG per tablet; Take 1 tablet by mouth 2 (Two) Times a Day for 6 days.  Dispense: 12 tablet; Refill: 0  -Patient has enough albuterol at home to last him through this course  -Also added, and over-the-counter options to use for symptom support via after visit summary  -Discussed with patient that if the antibiotic course does not resolve his symptoms, I would strongly suspect he is having an allergic flare.  Patient verbalized understanding    2. Overweight  Assessment & Plan:  Patient's (Body mass index is 32.4 kg/m².) indicates that they are overweight with health conditions that include diabetes mellitus and dyslipidemias . Weight is unchanged. BMI is is above average; BMI management plan is completed. We discussed portion control and increasing exercise.              Follow Up  -As needed    Expected course, medications, and adverse effects discussed as appropriate.  Call or return if worsening or persistent symptoms.  I wore protective equipment throughout this patient encounter to include mask and eye protection. Hand hygiene was performed before donning protective equipment and after removal when leaving the room.    This document is intended for medical expert use only. Reading of this document by patients and/or patient's family without participating medical staff guidance may result in  misinterpretation and unintended morbidity. Any interpretation of such data is the responsibility of the patient and/or family member responsible for the patient in concert with their primary or specialist providers, not to be left for sources of online searches such as [employer], [employer] or similar queries. Relying on these approaches to knowledge may result in misinterpretation, misguided goals of care and even death should patients or family members try recommendations outside of the realm of professional medical care.

## 2023-05-01 NOTE — ASSESSMENT & PLAN NOTE
Patient's (Body mass index is 32.4 kg/m².) indicates that they are overweight with health conditions that include diabetes mellitus and dyslipidemias . Weight is unchanged. BMI is is above average; BMI management plan is completed. We discussed portion control and increasing exercise.

## 2023-05-08 ENCOUNTER — TELEPHONE (OUTPATIENT)
Dept: FAMILY MEDICINE CLINIC | Facility: CLINIC | Age: 68
End: 2023-05-08
Payer: MEDICARE

## 2023-05-08 NOTE — TELEPHONE ENCOUNTER
I called Dr. Menendez office and requested office notes and all testing that he has done.      I also spoke with Jeffrey and explained to him that we can adminster B12 injections but they may not be covered by insurance.,

## 2023-05-08 NOTE — TELEPHONE ENCOUNTER
Ekaterina with Dr. Menendez Office phoned in regarding an order that was faxed to us  to set Jeffrey up on B-12 Injections. His wife called  office to find out why we  Haven't set him up yet. Would you pleasew check into this.

## 2023-05-11 ENCOUNTER — TELEPHONE (OUTPATIENT)
Dept: FAMILY MEDICINE CLINIC | Facility: CLINIC | Age: 68
End: 2023-05-11
Payer: MEDICARE

## 2023-05-15 ENCOUNTER — OFFICE VISIT (OUTPATIENT)
Dept: FAMILY MEDICINE CLINIC | Facility: CLINIC | Age: 68
End: 2023-05-15
Payer: MEDICARE

## 2023-05-15 VITALS
RESPIRATION RATE: 14 BRPM | WEIGHT: 226.6 LBS | HEART RATE: 62 BPM | BODY MASS INDEX: 32.44 KG/M2 | SYSTOLIC BLOOD PRESSURE: 133 MMHG | TEMPERATURE: 97.5 F | HEIGHT: 70 IN | OXYGEN SATURATION: 97 % | DIASTOLIC BLOOD PRESSURE: 71 MMHG

## 2023-05-15 DIAGNOSIS — E66.3 OVERWEIGHT: Primary | ICD-10-CM

## 2023-05-15 DIAGNOSIS — R20.2 PARESTHESIA: ICD-10-CM

## 2023-05-15 DIAGNOSIS — E11.43 TYPE 2 DIABETES MELLITUS WITH DIABETIC AUTONOMIC NEUROPATHY, WITHOUT LONG-TERM CURRENT USE OF INSULIN: ICD-10-CM

## 2023-05-15 NOTE — PROGRESS NOTES
Subjective   Jeffrey Aviles JR is a 67 y.o. male.     History of Present Illness    Discuss starting B-12 injections    Burning in lower extremities.       Diabetes  He presents for his follow-up diabetic visit. He has type 2 diabetes mellitus. His disease course has been fluctuating. There are no hypoglycemic associated symptoms. There are no diabetic associated symptoms. There are no hypoglycemic complications. Diabetic complications include nephropathy.        The following portions of the patient's history were reviewed and updated as appropriate: current medications, past family history, past medical history, past social history, past surgical history and problem list.    Family History   Problem Relation Age of Onset   • Cerebral aneurysm Mother          at 42   • Hypertension Mother    • Hyperlipidemia Father    • Hypertension Father    • Other Father         spinal stenosis   • Asthma Father         living at 86   • Prostate cancer Father    • Cancer Father         Prostate cancer  at 87   • Breast cancer Sister    • Hyperlipidemia Sister    • Prostate cancer Maternal Grandfather    • Diabetes Maternal Uncle    • Heart disease Maternal Uncle    • Lung cancer Maternal Uncle    • Hypertension Brother    • Hyperlipidemia Brother    • Other Brother 30        Hepatitis   • Stroke Maternal Grandmother        Social History     Tobacco Use   • Smoking status: Never     Passive exposure: Never   • Smokeless tobacco: Never   Substance Use Topics   • Alcohol use: No   • Drug use: No       Past Surgical History:   Procedure Laterality Date   • COLONOSCOPY  2016    Repeat    • PAROTID DUCT LIGATION         Patient Active Problem List   Diagnosis   • Bilateral hearing loss   • Chronic allergic rhinitis due to pollen   • Displacement of cervical intervertebral disc   • History of asbestos exposure   • Mixed hyperlipidemia   • Overweight   • Mild intermittent asthma without complication   • Type 2  diabetes mellitus with diabetic autonomic neuropathy, without long-term current use of insulin   • Screening PSA (prostate specific antigen)   • Arthritis, multiple joint involvement   • Family history of polyps in the colon   • Myalgia   • Encounter for general adult medical examination with abnormal findings   • Pneumonia due to COVID-19 virus   • Erectile dysfunction   • Nocturia   • Pulmonary nodule   • Multilevel degenerative disc disease   • Paresthesia   • Asthmatic bronchitis , chronic       Current Outpatient Medications on File Prior to Visit   Medication Sig Dispense Refill   • albuterol sulfate  (90 Base) MCG/ACT inhaler Inhale 2 puffs Every 4 (Four) Hours As Needed for Wheezing. 18 g 5   • Asmanex  MCG/ACT aerosol Inhale 1 inhaler Daily. 13 g 0   • aspirin 81 MG tablet Take 1 tablet by mouth Daily.     • atorvastatin (LIPITOR) 20 MG tablet TAKE 1 TABLET BY MOUTH EVERY DAY IN THE EVENING 90 tablet 1   • cyclobenzaprine (FLEXERIL) 10 MG tablet Take 1 tablet by mouth every night at bedtime. 30 tablet 5   • fluticasone (FLONASE) 50 MCG/ACT nasal spray 2 sprays into the nostril(s) as directed by provider Daily. 16 g 3   • glucose blood test strip Blood sugar 1 x daily 100 each 12   • ibuprofen (ADVIL,MOTRIN) 800 MG tablet Take 1 tablet by mouth Every 8 (Eight) Hours As Needed for Mild Pain. 90 tablet 5   • loratadine (CLARITIN) 10 MG tablet Take 1 tablet by mouth Daily. 30 tablet 5   • metFORMIN (GLUCOPHAGE) 1000 MG tablet Take 1 tablet by mouth 2 (Two) Times a Day. 180 tablet 5   • tadalafil (Cialis) 5 MG tablet Take 1 tablet by mouth Daily As Needed for Erectile Dysfunction. Take 4 pills on 1st day then 1 daily 90 tablet 2   • vitamin B-12 (CYANOCOBALAMIN) 1000 MCG tablet Take 1 tablet by mouth Daily.       No current facility-administered medications on file prior to visit.       No Known Allergies    Review of Systems    Objective   Visit Vitals  /71 (BP Location: Left arm, Patient  "Position: Sitting, Cuff Size: Adult)   Pulse 62   Temp 97.5 °F (36.4 °C)   Resp 14   Ht 177.8 cm (70\")   Wt 103 kg (226 lb 9.6 oz)   SpO2 97%   BMI 32.51 kg/m²     Physical Exam  Vitals and nursing note reviewed.   Constitutional:       Appearance: He is well-developed.   HENT:      Head: Normocephalic.   Neck:      Thyroid: No thyromegaly.      Vascular: No carotid bruit.      Trachea: Trachea normal.   Cardiovascular:      Rate and Rhythm: Normal rate and regular rhythm.      Heart sounds: No murmur heard.    No friction rub. No gallop.   Pulmonary:      Effort: Pulmonary effort is normal. No respiratory distress.      Breath sounds: Normal breath sounds. No wheezing.   Chest:      Chest wall: No tenderness.   Musculoskeletal:      Cervical back: Neck supple.   Skin:     General: Skin is dry.      Findings: No rash.      Nails: There is no clubbing.   Neurological:      Mental Status: He is alert and oriented to person, place, and time.      Sensory: Sensation is intact.   Psychiatric:         Behavior: Behavior is cooperative.           Assessment & Plan .  Problem List Items Addressed This Visit        Medium    Type 2 diabetes mellitus with diabetic autonomic neuropathy, without long-term current use of insulin    Current Assessment & Plan     Doing well; Discussed possibly starting diabetic injectible medications.  Patient will contact insurance to research costs            Unprioritized    Overweight - Primary    Current Assessment & Plan     Patient's (Body mass index is 32.51 kg/m².) indicates that they are overweight with health conditions that include diabetes mellitus . Weight is unchanged. BMI is is above average; BMI management plan is completed. We discussed low calorie, low carb based diet program, portion control and increasing exercise.          Paresthesia    Current Assessment & Plan     Improving since taking B12 tablets;  Patient is going to hold on starting the B12 injections at this time.  " Will add a methylmalonic acid level to his next labs. Offered a referral for NCS and starting neurontin-  patient declines          Relevant Orders    Methylmalonic Acid, Serum

## 2023-05-15 NOTE — ASSESSMENT & PLAN NOTE
Doing well on metformin without side effects.; Discussed possibly starting diabetic injectible medications.  Patient will contact insurance to research costs

## 2023-05-15 NOTE — ASSESSMENT & PLAN NOTE
Patient's (Body mass index is 32.51 kg/m².) indicates that they are overweight with health conditions that include diabetes mellitus . Weight is unchanged. BMI is is above average; BMI management plan is completed. We discussed low calorie, low carb based diet program, portion control and increasing exercise.

## 2023-05-15 NOTE — ASSESSMENT & PLAN NOTE
Improving since taking B12 tablets;  Patient is going to hold on starting the B12 injections at this time.  Will add a methylmalonic acid level to his next labs. Offered a referral for NCS and starting neurontin-  patient declines

## 2023-06-08 LAB
ALBUMIN SERPL-MCNC: 4.3 G/DL (ref 3.8–4.8)
ALBUMIN/GLOB SERPL: 1.7 {RATIO} (ref 1.2–2.2)
ALP SERPL-CCNC: 61 IU/L (ref 44–121)
ALT SERPL-CCNC: 17 IU/L (ref 0–44)
AST SERPL-CCNC: 16 IU/L (ref 0–40)
BILIRUB SERPL-MCNC: 0.7 MG/DL (ref 0–1.2)
BUN SERPL-MCNC: 21 MG/DL (ref 8–27)
BUN/CREAT SERPL: 29 (ref 10–24)
CALCIUM SERPL-MCNC: 8.9 MG/DL (ref 8.6–10.2)
CHLORIDE SERPL-SCNC: 104 MMOL/L (ref 96–106)
CHOLEST SERPL-MCNC: 120 MG/DL (ref 100–199)
CHOLEST/HDLC SERPL: 3.5 RATIO (ref 0–5)
CO2 SERPL-SCNC: 25 MMOL/L (ref 20–29)
CREAT SERPL-MCNC: 0.73 MG/DL (ref 0.76–1.27)
EGFRCR SERPLBLD CKD-EPI 2021: 100 ML/MIN/1.73
GLOBULIN SER CALC-MCNC: 2.5 G/DL (ref 1.5–4.5)
GLUCOSE SERPL-MCNC: 124 MG/DL (ref 70–99)
HBA1C MFR BLD: 6.1 % (ref 4.8–5.6)
HDLC SERPL-MCNC: 34 MG/DL
LDLC SERPL CALC-MCNC: 63 MG/DL (ref 0–99)
POTASSIUM SERPL-SCNC: 4.6 MMOL/L (ref 3.5–5.2)
PROT SERPL-MCNC: 6.8 G/DL (ref 6–8.5)
SODIUM SERPL-SCNC: 141 MMOL/L (ref 134–144)
TRIGL SERPL-MCNC: 129 MG/DL (ref 0–149)
VLDLC SERPL CALC-MCNC: 23 MG/DL (ref 5–40)

## 2023-06-10 LAB — METHYLMALONATE SERPL-SCNC: 93 NMOL/L (ref 0–378)

## 2023-06-14 ENCOUNTER — OFFICE VISIT (OUTPATIENT)
Dept: FAMILY MEDICINE CLINIC | Facility: CLINIC | Age: 68
End: 2023-06-14
Payer: MEDICARE

## 2023-06-14 VITALS
RESPIRATION RATE: 15 BRPM | WEIGHT: 219.2 LBS | DIASTOLIC BLOOD PRESSURE: 71 MMHG | HEART RATE: 63 BPM | HEIGHT: 70 IN | BODY MASS INDEX: 31.38 KG/M2 | OXYGEN SATURATION: 97 % | TEMPERATURE: 97.5 F | SYSTOLIC BLOOD PRESSURE: 125 MMHG

## 2023-06-14 DIAGNOSIS — E66.3 OVERWEIGHT: ICD-10-CM

## 2023-06-14 DIAGNOSIS — E78.2 MIXED HYPERLIPIDEMIA: Primary | ICD-10-CM

## 2023-06-14 DIAGNOSIS — E11.43 TYPE 2 DIABETES MELLITUS WITH DIABETIC AUTONOMIC NEUROPATHY, WITHOUT LONG-TERM CURRENT USE OF INSULIN: ICD-10-CM

## 2023-06-14 DIAGNOSIS — R20.2 PARESTHESIA: ICD-10-CM

## 2023-06-14 NOTE — ASSESSMENT & PLAN NOTE
Patient's (Body mass index is 31.45 kg/m².) indicates that they are overweight with health conditions that include diabetes mellitus . Weight is unchanged. BMI is is above average; BMI management plan is completed. We discussed low calorie, low carb based diet program, portion control, and increasing exercise.

## 2023-06-14 NOTE — ASSESSMENT & PLAN NOTE
--Improved,   Encouraged to watch sugar intake, exercise more and lose weight.   compliant with medication. Patient tolerated metformin well without side effects. I feel the benefits of the medication outweigh the risks.    monitoring sugar at home.   Follow up in 4 months  Care management needs are self-addressed.  Self-management abilities addressed and patient is capable of managing his own disease.

## 2023-06-14 NOTE — PROGRESS NOTES
Subjective   Jeffrey Aviles JR is a 67 y.o. male.     Hypertension  This is a chronic problem. The current episode started more than 1 year ago. The problem has been gradually improving since onset. The problem is controlled. Pertinent negatives include no chest pain, palpitations or shortness of breath.   Hyperlipidemia  This is a chronic problem. The current episode started more than 1 year ago. The problem is controlled. Pertinent negatives include no chest pain, myalgias or shortness of breath. Current antihyperlipidemic treatment includes statins.   Diabetes  He presents for his follow-up diabetic visit. He has type 2 diabetes mellitus. The initial diagnosis of diabetes was made 20 years ago. His disease course has been stable. There are no hypoglycemic associated symptoms. There are no diabetic associated symptoms. Pertinent negatives for diabetes include no chest pain, no fatigue, no polyphagia, no polyuria and no weight loss.      The following portions of the patient's history were reviewed and updated as appropriate: allergies, current medications, past family history, past medical history, past social history, past surgical history, and problem list.    Family History   Problem Relation Age of Onset    Cerebral aneurysm Mother          at 42    Hypertension Mother     Hyperlipidemia Father     Hypertension Father     Other Father         spinal stenosis    Asthma Father         living at 86    Prostate cancer Father     Cancer Father         Prostate cancer  at 87    Breast cancer Sister     Hyperlipidemia Sister     Prostate cancer Maternal Grandfather     Diabetes Maternal Uncle     Heart disease Maternal Uncle     Lung cancer Maternal Uncle     Hypertension Brother     Hyperlipidemia Brother     Other Brother 30        Hepatitis    Stroke Maternal Grandmother        Social History     Tobacco Use    Smoking status: Never     Passive exposure: Never    Smokeless tobacco: Never   Substance Use  Topics    Alcohol use: No    Drug use: No       Past Surgical History:   Procedure Laterality Date    COLONOSCOPY  06/2016    Repeat 6-21-6-22    PAROTID DUCT LIGATION         Patient Active Problem List   Diagnosis    Bilateral hearing loss    Chronic allergic rhinitis due to pollen    Displacement of cervical intervertebral disc    History of asbestos exposure    Mixed hyperlipidemia    Overweight    Mild intermittent asthma without complication    Type 2 diabetes mellitus with diabetic autonomic neuropathy, without long-term current use of insulin    Screening PSA (prostate specific antigen)    Arthritis, multiple joint involvement    Family history of polyps in the colon    Myalgia    Encounter for general adult medical examination with abnormal findings    Pneumonia due to COVID-19 virus    Erectile dysfunction    Nocturia    Pulmonary nodule    Multilevel degenerative disc disease    Paresthesia    Asthmatic bronchitis , chronic       Current Outpatient Medications on File Prior to Visit   Medication Sig Dispense Refill    albuterol sulfate  (90 Base) MCG/ACT inhaler Inhale 2 puffs Every 4 (Four) Hours As Needed for Wheezing. 18 g 5    Asmanex  MCG/ACT aerosol Inhale 1 inhaler Daily. 13 g 0    aspirin 81 MG tablet Take 1 tablet by mouth Daily.      atorvastatin (LIPITOR) 20 MG tablet TAKE 1 TABLET BY MOUTH EVERY DAY IN THE EVENING 90 tablet 1    cyclobenzaprine (FLEXERIL) 10 MG tablet Take 1 tablet by mouth every night at bedtime. 30 tablet 5    fluticasone (FLONASE) 50 MCG/ACT nasal spray 2 sprays into the nostril(s) as directed by provider Daily. 16 g 3    glucose blood test strip Blood sugar 1 x daily 100 each 12    ibuprofen (ADVIL,MOTRIN) 800 MG tablet Take 1 tablet by mouth Every 8 (Eight) Hours As Needed for Mild Pain. 90 tablet 5    loratadine (CLARITIN) 10 MG tablet Take 1 tablet by mouth Daily. 30 tablet 5    metFORMIN (GLUCOPHAGE) 1000 MG tablet Take 1 tablet by mouth 2 (Two) Times a  "Day. 180 tablet 5    tadalafil (Cialis) 5 MG tablet Take 1 tablet by mouth Daily As Needed for Erectile Dysfunction. Take 4 pills on 1st day then 1 daily 90 tablet 2     No current facility-administered medications on file prior to visit.       No Known Allergies    Review of Systems   Constitutional:  Negative for fatigue, unexpected weight gain and unexpected weight loss.   Eyes:  Negative for visual disturbance.   Respiratory:  Negative for shortness of breath.    Cardiovascular:  Negative for chest pain, palpitations and leg swelling.   Gastrointestinal:  Negative for nausea.   Endocrine: Negative for polyphagia and polyuria.   Genitourinary:  Negative for frequency.   Musculoskeletal:  Negative for myalgias.   Skin:  Negative for dry skin and skin lesions.   Neurological:  Negative for syncope, numbness and headache.     Objective   Visit Vitals  /71 (BP Location: Left arm, Patient Position: Sitting, Cuff Size: Adult)   Pulse 63   Temp 97.5 °F (36.4 °C)   Resp 15   Ht 177.8 cm (70\")   Wt 99.4 kg (219 lb 3.2 oz)   SpO2 97%   BMI 31.45 kg/m²     Physical Exam  Vitals and nursing note reviewed.   Constitutional:       Appearance: He is well-developed.   HENT:      Head: Normocephalic.   Neck:      Thyroid: No thyromegaly.      Vascular: No carotid bruit.      Trachea: Trachea normal.   Cardiovascular:      Rate and Rhythm: Normal rate and regular rhythm.      Heart sounds: No murmur heard.    No friction rub. No gallop.   Pulmonary:      Effort: Pulmonary effort is normal. No respiratory distress.      Breath sounds: Normal breath sounds. No wheezing.   Chest:      Chest wall: No tenderness.   Musculoskeletal:      Cervical back: Neck supple.   Skin:     General: Skin is dry.      Findings: No rash.      Nails: There is no clubbing.   Neurological:      Mental Status: He is alert and oriented to person, place, and time.   Psychiatric:         Behavior: Behavior is cooperative.         Assessment & Plan " .  Problem List Items Addressed This Visit          Medium    Mixed hyperlipidemia - Primary    Current Assessment & Plan     Improved except HDL  Encouraged to watch fatty intake, exercise more, and lose weight.   compliant with medication  Patient tolerated lipitor well without side effects. I feel the benefits of the medication outweigh the risks.    Is not getting adequate diet and exercise  Goals developed at last visit were not met   Follow up in  4 months  Care management needs are self-addressed. Self-management abilities addressed and patient is capable of managing his own disease.           Type 2 diabetes mellitus with diabetic autonomic neuropathy, without long-term current use of insulin    Current Assessment & Plan     --Improved,   Encouraged to watch sugar intake, exercise more and lose weight.   compliant with medication. Patient tolerated metformin well without side effects. I feel the benefits of the medication outweigh the risks.    monitoring sugar at home.   Follow up in 4 months  Care management needs are self-addressed.  Self-management abilities addressed and patient is capable of managing his own disease.              Low    Paresthesia    Current Assessment & Plan     Stable--methylmalonic acid was done and was negative            Unprioritized    Overweight    Current Assessment & Plan     Patient's (Body mass index is 31.45 kg/m².) indicates that they are overweight with health conditions that include diabetes mellitus . Weight is unchanged. BMI is is above average; BMI management plan is completed. We discussed low calorie, low carb based diet program, portion control, and increasing exercise.

## 2023-06-14 NOTE — ASSESSMENT & PLAN NOTE
Improved except HDL  Encouraged to watch fatty intake, exercise more, and lose weight.   compliant with medication  Patient tolerated lipitor well without side effects. I feel the benefits of the medication outweigh the risks.    Is not getting adequate diet and exercise  Goals developed at last visit were not met   Follow up in  4 months  Care management needs are self-addressed. Self-management abilities addressed and patient is capable of managing his own disease.

## 2023-09-13 DIAGNOSIS — E78.2 MIXED HYPERLIPIDEMIA: ICD-10-CM

## 2023-09-13 RX ORDER — ATORVASTATIN CALCIUM 20 MG/1
TABLET, FILM COATED ORAL
Qty: 90 TABLET | Refills: 0 | Status: SHIPPED | OUTPATIENT
Start: 2023-09-13

## 2023-09-14 DIAGNOSIS — E11.43 TYPE 2 DIABETES MELLITUS WITH DIABETIC AUTONOMIC NEUROPATHY, WITHOUT LONG-TERM CURRENT USE OF INSULIN: ICD-10-CM

## 2023-10-19 ENCOUNTER — OFFICE VISIT (OUTPATIENT)
Dept: FAMILY MEDICINE CLINIC | Facility: CLINIC | Age: 68
End: 2023-10-19
Payer: MEDICARE

## 2023-10-19 VITALS
OXYGEN SATURATION: 98 % | SYSTOLIC BLOOD PRESSURE: 132 MMHG | TEMPERATURE: 98 F | BODY MASS INDEX: 32.38 KG/M2 | DIASTOLIC BLOOD PRESSURE: 78 MMHG | HEIGHT: 70 IN | WEIGHT: 226.2 LBS | HEART RATE: 68 BPM | RESPIRATION RATE: 18 BRPM

## 2023-10-19 DIAGNOSIS — Z71.89 ADVANCE CARE PLANNING: ICD-10-CM

## 2023-10-19 DIAGNOSIS — J45.20 MILD INTERMITTENT ASTHMA WITHOUT COMPLICATION: Primary | ICD-10-CM

## 2023-10-19 DIAGNOSIS — E11.43 TYPE 2 DIABETES MELLITUS WITH DIABETIC AUTONOMIC NEUROPATHY, WITHOUT LONG-TERM CURRENT USE OF INSULIN: ICD-10-CM

## 2023-10-19 DIAGNOSIS — M50.20 DISPLACEMENT OF CERVICAL INTERVERTEBRAL DISC: ICD-10-CM

## 2023-10-19 DIAGNOSIS — Z71.85 IMMUNIZATION COUNSELING: ICD-10-CM

## 2023-10-19 DIAGNOSIS — Z12.11 COLON CANCER SCREENING: ICD-10-CM

## 2023-10-19 DIAGNOSIS — Z00.00 MEDICARE ANNUAL WELLNESS VISIT, INITIAL: ICD-10-CM

## 2023-10-19 DIAGNOSIS — R35.1 NOCTURIA: ICD-10-CM

## 2023-10-19 DIAGNOSIS — M79.10 MYALGIA: ICD-10-CM

## 2023-10-19 DIAGNOSIS — R91.1 PULMONARY NODULE: ICD-10-CM

## 2023-10-19 DIAGNOSIS — Z13.31 DEPRESSION SCREEN: Primary | ICD-10-CM

## 2023-10-19 DIAGNOSIS — E78.2 MIXED HYPERLIPIDEMIA: ICD-10-CM

## 2023-10-19 DIAGNOSIS — Z12.5 SCREENING PSA (PROSTATE SPECIFIC ANTIGEN): ICD-10-CM

## 2023-10-19 DIAGNOSIS — M51.9 LUMBAR DISC DISEASE: ICD-10-CM

## 2023-10-19 PROCEDURE — 3044F HG A1C LEVEL LT 7.0%: CPT | Performed by: FAMILY MEDICINE

## 2023-10-19 PROCEDURE — 99214 OFFICE O/P EST MOD 30 MIN: CPT | Performed by: FAMILY MEDICINE

## 2023-10-19 NOTE — ASSESSMENT & PLAN NOTE
Offered PFT's pt. Declines--patient tolerated albuterol well without side effects.  Benefits outweigh the risk  Advised to avoid dust and smoke exposure

## 2023-10-19 NOTE — ASSESSMENT & PLAN NOTE
Colonoscopy scheduled for 11-27-23.  Instructed of benefits and risks, including bleeding, perforation and complications of sedation. Op consent signed. Patient given verbal and written instruction sheet for prep.

## 2023-10-19 NOTE — PROGRESS NOTES
Subjective   Jeffrey Aviles JR is a 68 y.o. male.     Asthma  He complains of shortness of breath (asthma). This is a chronic problem. The current episode started more than 1 year ago. The problem occurs intermittently. The problem has been unchanged. Pertinent negatives include no chest pain. He reports significant improvement on treatment. His past medical history is significant for asthma.   Neck Pain   This is a chronic problem. The current episode started more than 1 year ago. The problem occurs intermittently. The problem has been unchanged. The patient is experiencing no pain. Pertinent negatives include no chest pain. The treatment provided significant relief.   Back Pain  This is a chronic problem. The current episode started more than 1 year ago. The problem occurs intermittently. The problem is unchanged. The patient is experiencing no pain. Pertinent negatives include no chest pain. The treatment provided significant relief.        The following portions of the patient's history were reviewed and updated as appropriate: allergies, current medications, past family history, past medical history, past social history, past surgical history, and problem list.    Family History   Problem Relation Age of Onset    Cerebral aneurysm Mother          at 42    Hypertension Mother     Hyperlipidemia Father     Hypertension Father     Other Father         spinal stenosis    Asthma Father         living at 86    Prostate cancer Father     Cancer Father         Prostate cancer  at 87    Breast cancer Sister     Hyperlipidemia Sister     Prostate cancer Maternal Grandfather     Diabetes Maternal Uncle     Heart disease Maternal Uncle     Lung cancer Maternal Uncle     Hypertension Brother     Hyperlipidemia Brother     Other Brother 30        Hepatitis    Stroke Maternal Grandmother        Social History     Tobacco Use    Smoking status: Never     Passive exposure: Never    Smokeless tobacco: Never    Substance Use Topics    Alcohol use: No    Drug use: No       Past Surgical History:   Procedure Laterality Date    COLONOSCOPY  06/2016    Repeat 6-21-6-22    PAROTID DUCT LIGATION         Patient Active Problem List   Diagnosis    Bilateral hearing loss    Chronic allergic rhinitis due to pollen    Displacement of cervical intervertebral disc    History of asbestos exposure    Mixed hyperlipidemia    Overweight    Mild intermittent asthma without complication    Type 2 diabetes mellitus with diabetic autonomic neuropathy, without long-term current use of insulin    Screening PSA (prostate specific antigen)    Arthritis, multiple joint involvement    Family history of polyps in the colon    Myalgia    Encounter for general adult medical examination with abnormal findings    Pneumonia due to COVID-19 virus    Erectile dysfunction    Nocturia    Pulmonary nodule    Lumbar disc disease    Paresthesia    Asthmatic bronchitis , chronic    Medicare annual wellness visit, initial    Depression screen    Immunization counseling    Colon cancer screening    Advance care planning       Current Outpatient Medications on File Prior to Visit   Medication Sig Dispense Refill    albuterol sulfate  (90 Base) MCG/ACT inhaler Inhale 2 puffs Every 4 (Four) Hours As Needed for Wheezing. 18 g 5    Asmanex  MCG/ACT aerosol Inhale 1 inhaler Daily. 13 g 0    aspirin 81 MG tablet Take 1 tablet by mouth Daily. (Patient not taking: Reported on 10/19/2023)      atorvastatin (LIPITOR) 20 MG tablet TAKE 1 TABLET BY MOUTH EVERY DAY IN THE EVENING 90 tablet 0    cyclobenzaprine (FLEXERIL) 10 MG tablet Take 1 tablet by mouth every night at bedtime. (Patient not taking: Reported on 10/19/2023) 30 tablet 5    fluticasone (FLONASE) 50 MCG/ACT nasal spray 2 sprays into the nostril(s) as directed by provider Daily. 16 g 3    glucose blood test strip Blood sugar 1 x daily 100 each 12    ibuprofen (ADVIL,MOTRIN) 800 MG tablet Take 1  tablet by mouth Every 8 (Eight) Hours As Needed for Mild Pain. 90 tablet 5    loratadine (CLARITIN) 10 MG tablet Take 1 tablet by mouth Daily. 30 tablet 5    metFORMIN (GLUCOPHAGE) 1000 MG tablet TAKE 1 TABLET BY MOUTH TWICE A  tablet 0    tadalafil (Cialis) 5 MG tablet Take 1 tablet by mouth Daily As Needed for Erectile Dysfunction. Take 4 pills on 1st day then 1 daily 90 tablet 2     No current facility-administered medications on file prior to visit.       No Known Allergies    Review of Systems   Constitutional:  Negative for fatigue.   HENT:  Negative for hearing loss.    Respiratory:  Positive for shortness of breath (asthma).    Cardiovascular:  Negative for chest pain and palpitations.   Genitourinary:  Positive for nocturia (1 x nightly). Negative for frequency.        Nocturia   Musculoskeletal:  Positive for back pain and neck pain. Negative for joint swelling.   Neurological:  Negative for memory problem.       Objective   There were no vitals taken for this visit.  Physical Exam  Vitals and nursing note reviewed.   Constitutional:       Appearance: He is well-developed.   HENT:      Head: Normocephalic.   Neck:      Thyroid: No thyromegaly.      Vascular: No carotid bruit.      Trachea: Trachea normal.   Cardiovascular:      Rate and Rhythm: Normal rate and regular rhythm.      Heart sounds: No murmur heard.     No friction rub. No gallop.   Pulmonary:      Effort: Pulmonary effort is normal. No respiratory distress.      Breath sounds: Normal breath sounds. No wheezing.   Chest:      Chest wall: No tenderness.   Musculoskeletal:      Cervical back: Neck supple. Decreased range of motion.      Lumbar back: Decreased range of motion.   Skin:     General: Skin is dry.      Findings: No rash.      Nails: There is no clubbing.   Neurological:      Mental Status: He is alert and oriented to person, place, and time.   Psychiatric:         Behavior: Behavior is cooperative.           Assessment & Plan  .  Problem List Items Addressed This Visit          Medium    Mixed hyperlipidemia    Current Assessment & Plan     Will order labs today and patient will return for results and shared decision making.           Relevant Orders    Lipid Panel With / Chol / HDL Ratio (Completed)    Comprehensive Metabolic Panel (Completed)    Type 2 diabetes mellitus with diabetic autonomic neuropathy, without long-term current use of insulin    Overview     Last eye exam 1-2023 with Dr. Castellanos         Current Assessment & Plan     Will order labs today and patient will return for results and shared decision making.           Relevant Orders    Hemoglobin A1c (Completed)    Microalbumin / Creatinine Urine Ratio - Urine, Clean Catch (Completed)       Low    Displacement of cervical intervertebral disc    Current Assessment & Plan     Stable.  Patient tolerated IBU and Flexeril PRN well without side effects. I feel the benefits of the medication outweigh the risks.          Mild intermittent asthma without complication - Primary    Current Assessment & Plan     Offered PFT's pt. Declines--patient tolerated albuterol well without side effects.  Benefits outweigh the risk  Advised to avoid dust and smoke exposure                Unprioritized    Colon cancer screening    Current Assessment & Plan     Colonoscopy scheduled for 11-27-23.  Instructed of benefits and risks, including bleeding, perforation and complications of sedation. Op consent signed. Patient given verbal and written instruction sheet for prep.          Lumbar disc disease    Overview     Found on x-ray done August 16, 2022 and reviewed with patient         Current Assessment & Plan     Stable.  Patient tolerated IBU and Flexeril PRN well without side effects. I feel the benefits of the medication outweigh the risks.          Myalgia    Current Assessment & Plan     Will order labs today and patient will return for results and shared decision making.           Relevant  Orders    CK (Completed)    Nocturia    Overview     1x nightly         Current Assessment & Plan     Will order labs today and patient will return for results and shared decision making.           Relevant Orders    Urinalysis without microscopic (no culture) - Urine, Clean Catch (Completed)    Pulmonary nodule    Overview     Abnormal CT chest 02/13/2022         Current Assessment & Plan     Pt. Given order for CT low dose repeat         Relevant Orders    CT Chest Low Dose Follow Up Without Contrast    Screening PSA (prostate specific antigen)    Current Assessment & Plan     Will order labs today and patient will return for results and shared decision making.           Relevant Orders    PSA Screen (Completed)

## 2023-10-19 NOTE — ASSESSMENT & PLAN NOTE
Stable.  Patient tolerated IBU and Flexeril PRN well without side effects. I feel the benefits of the medication outweigh the risks.

## 2023-10-19 NOTE — PROGRESS NOTES
The ABCs of the Annual Wellness Visit  Initial Medicare Wellness Visit        Subjective    History of Present Illness:  Jeffrey Aviles JR is a 68 y.o. male who presents for a Subsequent Medicare Wellness Visit.    The following portions of the patient's history were reviewed and   updated as appropriate: allergies, current medications, past family history, past medical history, past social history, past surgical history, and problem list.      Recent Hospitalizations:  He was not admitted to the hospital during the last year.       Current Medical Providers:  Patient Care Team:  Zhou Keen MD as PCP - General Deanne Castellanos (Optometry)  Asthma and Allergy (Pulmonary Disease)          Outpatient Medications Prior to Visit   Medication Sig Dispense Refill    albuterol sulfate  (90 Base) MCG/ACT inhaler Inhale 2 puffs Every 4 (Four) Hours As Needed for Wheezing. 18 g 5    Asmanex  MCG/ACT aerosol Inhale 1 inhaler Daily. 13 g 0    atorvastatin (LIPITOR) 20 MG tablet TAKE 1 TABLET BY MOUTH EVERY DAY IN THE EVENING 90 tablet 0    fluticasone (FLONASE) 50 MCG/ACT nasal spray 2 sprays into the nostril(s) as directed by provider Daily. 16 g 3    glucose blood test strip Blood sugar 1 x daily 100 each 12    ibuprofen (ADVIL,MOTRIN) 800 MG tablet Take 1 tablet by mouth Every 8 (Eight) Hours As Needed for Mild Pain. 90 tablet 5    loratadine (CLARITIN) 10 MG tablet Take 1 tablet by mouth Daily. 30 tablet 5    metFORMIN (GLUCOPHAGE) 1000 MG tablet TAKE 1 TABLET BY MOUTH TWICE A  tablet 0    tadalafil (Cialis) 5 MG tablet Take 1 tablet by mouth Daily As Needed for Erectile Dysfunction. Take 4 pills on 1st day then 1 daily 90 tablet 2    aspirin 81 MG tablet Take 1 tablet by mouth Daily. (Patient not taking: Reported on 10/19/2023)      cyclobenzaprine (FLEXERIL) 10 MG tablet Take 1 tablet by mouth every night at bedtime. (Patient not taking: Reported on 10/19/2023) 30 tablet 5     No  "facility-administered medications prior to visit.       No opioid medication identified on active medication list. I have reviewed chart for other potential  high risk medication/s and harmful drug interactions in the elderly.        Aspirin is on active medication list. Aspirin use is indicated based on review of current medical condition/s. Pros and cons of this therapy have been discussed today. Benefits of this medication outweigh potential harm.  Patient has been encouraged to continue taking this medication.  .      Patient Active Problem List   Diagnosis    Bilateral hearing loss    Chronic allergic rhinitis due to pollen    Displacement of cervical intervertebral disc    History of asbestos exposure    Mixed hyperlipidemia    Overweight    Mild intermittent asthma without complication    Type 2 diabetes mellitus with diabetic autonomic neuropathy, without long-term current use of insulin    Screening PSA (prostate specific antigen)    Arthritis, multiple joint involvement    Family history of polyps in the colon    Myalgia    Encounter for general adult medical examination with abnormal findings    Pneumonia due to COVID-19 virus    Erectile dysfunction    Nocturia    Pulmonary nodule    Lumbar disc disease    Paresthesia    Asthmatic bronchitis , chronic    Medicare annual wellness visit, initial    Depression screen    Immunization counseling    Colon cancer screening    Advance care planning            Above medical problems all reviewed and significant problems identified and reviewed in the E and M visit.   Objective          Vitals:    10/19/23 0858   BP: 132/78   BP Location: Left arm   Patient Position: Sitting   Cuff Size: Adult   Pulse: 68   Resp: 18   Temp: 98 °F (36.7 °C)   TempSrc: Temporal   SpO2: 98%   Weight: 103 kg (226 lb 3.2 oz)   Height: 177.8 cm (70\")   PainSc: 0-No pain     Estimated body mass index is 32.46 kg/m² as calculated from the following:    Height as of this encounter: 177.8 " "cm (70\").    Weight as of this encounter: 103 kg (226 lb 3.2 oz).           Does the patient have evidence of cognitive impairment? No           Family History   Problem Relation Age of Onset    Cerebral aneurysm Mother          at 42    Hypertension Mother     Hyperlipidemia Father     Hypertension Father     Other Father         spinal stenosis    Asthma Father         living at 86    Prostate cancer Father     Cancer Father         Prostate cancer  at 87    Breast cancer Sister     Hyperlipidemia Sister     Prostate cancer Maternal Grandfather     Diabetes Maternal Uncle     Heart disease Maternal Uncle     Lung cancer Maternal Uncle     Hypertension Brother     Hyperlipidemia Brother     Other Brother 30        Hepatitis    Stroke Maternal Grandmother        Compared to one year ago, the patient feels his physical   health is better.    Compared to one year ago, the patient feels his mental   health is the same.    HEALTH RISK ASSESSMENT    Smoking Status:  Social History     Tobacco Use   Smoking Status Never    Passive exposure: Never   Smokeless Tobacco Never     Alcohol Consumption:  Social History     Substance and Sexual Activity   Alcohol Use No     Fall Risk Screen:    STEADI Fall Risk Assessment was completed, and patient is at LOW risk for falls.Assessment completed on:10/19/2023    Depression Screening:  Depression screen reviewed and discussed with patient. Recommendations were not needed. Medications were not discussed, counseling was not discussed. We spent 3 minutes with the screen and discussion of depression and options.         10/19/2023     9:01 AM   PHQ-2/PHQ-9 Depression Screening   Little Interest or Pleasure in Doing Things 0-->not at all   Feeling Down, Depressed or Hopeless 0-->not at all   PHQ-9: Brief Depression Severity Measure Score 0       Health Habits and Functional and Cognitive Screening:      10/19/2023     8:59 AM   Functional & Cognitive Status   Do you have " difficulty preparing food and eating? No   Do you have difficulty bathing yourself, getting dressed or grooming yourself? No   Do you have difficulty using the toilet? No   Do you have difficulty moving around from place to place? No   Do you have trouble with steps or getting out of a bed or a chair? No   Current Diet Well Balanced Diet   Dental Exam Up to date   Eye Exam Up to date   Exercise (times per week) 0 times per week   Current Exercises Include No Regular Exercise   Do you need help using the phone?  No   Are you deaf or do you have serious difficulty hearing?  No   Do you need help to go to places out of walking distance? No   Do you need help shopping? No   Do you need help preparing meals?  No   Do you need help with housework?  No   Do you need help with laundry? No   Do you need help taking your medications? No   Do you need help managing money? No   Do you ever drive or ride in a car without wearing a seat belt? No   Have you felt unusual stress, anger or loneliness in the last month? No   Who do you live with? Spouse   If you need help, do you have trouble finding someone available to you? No   Have you been bothered in the last four weeks by sexual problems? No   Do you have difficulty concentrating, remembering or making decisions? No       Age-appropriate Screening Schedule:  Refer to the list below for future screening recommendations based on patient's age, sex and/or medical conditions. Orders for these recommended tests are listed in the plan section. The patient has been provided with a written plan.    Health Maintenance   Topic Date Due    COVID-19 Vaccine (1) Never done    Pneumococcal Vaccine 65+ (1 - PCV) Never done    ZOSTER VACCINE (1 of 2) Never done    HEPATITIS C SCREENING  Never done    DIABETIC EYE EXAM  Never done    DIABETIC FOOT EXAM  11/20/2020    INFLUENZA VACCINE  08/01/2023    HEMOGLOBIN A1C  04/19/2024    BMI FOLLOWUP  06/14/2024    ANNUAL WELLNESS VISIT  10/19/2024     LIPID PANEL  10/19/2024    URINE MICROALBUMIN  10/19/2024    TDAP/TD VACCINES (2 - Td or Tdap) 05/03/2026    COLORECTAL CANCER SCREENING  06/06/2026       Immunizations:  Jeffrey Aviles JR is due for Shingrix, Prevnar, Influenza, and Covid    Colorectal Screening  Jeffrey Aviles JR last colonoscopy was 6-6-16 by myself-advised repeat 2021  Last Completed Colonoscopy            COLORECTAL CANCER SCREENING (COLONOSCOPY - Every 10 Years) Next due on 6/6/2026 06/06/2016  SCANNED - COLONOSCOPY    06/02/2016  Outside Procedure: [place] COLONOSCOPY FLX DX W/COLLJ SPEC WHEN PFRMD    11/17/2011  Outside Procedure: [place] COLONOSCOPY,DIAGNOSTIC                       Assessment & Plan   CMS Preventative Services Quick Reference    Risk Factors Identified During Encounter  Need for colonoscopy October 19, 2023 and completed October 19, 2023 and patient is low risk    Immunizations Discussed/Encouraged: Influenza, Prevnar 20 (Pneumococcal 20-valent conjugate), Shingrix, and COVID19  The above risks/problems have been discussed with the patient.  Follow up actions/plans if indicated are seen below in the Assessment/Plan Section.  Pertinent information has been shared with the patient in the After Visit Summary.    I have identified multiple medical problems which are significant and separately identifiable that require added work above and beyond the wellness visit. These are not trivial or insignificant and are billed with a 25-modifier  These are in a separate E/M note      Sit-to-Stand Exercise    The sit-to-stand exercise (also known as the chair stand or chair rise exercise) strengthens your lower body and helps you maintain or improve your mobility and independence. The goal is to do the sit-to-stand exercise without using your hands. This will be easier as you become stronger. You should always talk with your health care provider before starting any exercise program, especially if you have had recent surgery.  Do the exercise  exactly as told by your health care provider and adjust it as directed. It is normal to feel mild stretching, pulling, tightness, or discomfort as you do this exercise, but you should stop right away if you feel sudden pain or your pain gets worse. Do not begin doing this exercise until told by your health care provider.  What the sit-to-stand exercise does  The sit-to-stand exercise helps to strengthen the muscles in your thighs and the muscles in the center of your body that give you stability (core muscles). This exercise is especially helpful if:  You have had knee or hip surgery.  You have trouble getting up from a chair, out of a car, or off the toilet.  How to do the sit-to-stand exercise  Sit toward the front edge of a sturdy chair without armrests. Your knees should be bent and your feet should be flat on the floor and shoulder-width apart.  Place your hands lightly on each side of the seat. Keep your back and neck as straight as possible, with your chest slightly forward.  Breathe in slowly. Lean forward and slightly shift your weight to the front of your feet.  Breathe out as you slowly stand up. Use your hands as little as possible.  Stand and pause for a full breath in and out.  Breathe in as you sit down slowly. Tighten your core and abdominal muscles to control your lowering as much as possible.  Breathe out slowly.  Do this exercise 10-15 times. If needed, do it fewer times until you build up strength.  Rest for 1 minute, then do another set of 10-15 repetitions.  To change the difficulty of the sit-to-stand exercise  If the exercise is too difficult, use a chair with sturdy armrests, and push off the armrests to help you come to the standing position. You can also use the armrests to help slowly lower yourself back to sitting. As this gets easier, try to use your arms less. You can also place a firm cushion or pillow on the chair to make the surface higher.  If this exercise is too easy, do not use  your arms to help raise or lower yourself. You can also wear a weighted vest, use hand weights, increase your repetitions, or try a lower chair.  General tips  You may feel tired when starting an exercise routine. This is normal.  You may have muscle soreness that lasts a few days. This is normal. As you get stronger, you may not feel muscle soreness.  Use smooth, steady movements.  Do not  hold your breath during strength exercises. This can cause unsafe changes in your blood pressure.  Breathe in slowly through your nose, and breathe out slowly through your mouth.  Summary  Strengthening your lower body is an important step to help you move safely and independently.  The sit-to-stand exercise helps strengthen the muscles in your thighs and core.  You should always talk with your health care provider before starting any exercise program, especially if you have had recent surgery.  This information is not intended to replace advice given to you by your health care provider. Make sure you discuss any questions you have with your health care provider.  Document Revised: 10/16/2019 Document Reviewed: 02/08/2018  [employer] Patient Education © 2021 [employer]      Fall Prevention in the Home, Adult  Falls can cause injuries and can happen to people of all ages. There are many things you can do to make your home safe and to help prevent falls. Ask for help when making these changes.  What actions can I take to prevent falls?  General Instructions  Use good lighting in all rooms. Replace any light bulbs that burn out.  Turn on the lights in dark areas. Use night-lights.  Keep items that you use often in easy-to-reach places. Lower the shelves around your home if needed.  Set up your furniture so you have a clear path. Avoid moving your furniture around.  Do not have throw rugs or other things on the floor that can make you trip.  Avoid walking on wet floors.  If any of your floors are uneven, fix them.  Add color or  contrast paint or tape to clearly nikos and help you see:  Grab bars or handrails.  First and last steps of staircases.  Where the edge of each step is.  If you use a stepladder:  Make sure that it is fully opened. Do not climb a closed stepladder.  Make sure the sides of the stepladder are locked in place.  Ask someone to hold the stepladder while you use it.  Know where your pets are when moving through your home.  What can I do in the bathroom?         Keep the floor dry. Clean up any water on the floor right away.  Remove soap buildup in the tub or shower.  Use nonskid mats or decals on the floor of the tub or shower.  Attach bath mats securely with double-sided, nonslip rug tape.  If you need to sit down in the shower, use a plastic, nonslip stool.  Install grab bars by the toilet and in the tub and shower. Do not use towel bars as grab bars.  What can I do in the bedroom?  Make sure that you have a light by your bed that is easy to reach.  Do not use any sheets or blankets for your bed that hang to the floor.  Have a firm chair with side arms that you can use for support when you get dressed.  What can I do in the kitchen?  Clean up any spills right away.  If you need to reach something above you, use a step stool with a grab bar.  Keep electrical cords out of the way.  Do not use floor polish or wax that makes floors slippery.  What can I do with my stairs?  Do not leave any items on the stairs.  Make sure that you have a light switch at the top and the bottom of the stairs.  Make sure that there are handrails on both sides of the stairs. Fix handrails that are broken or loose.  Install nonslip stair treads on all your stairs.  Avoid having throw rugs at the top or bottom of the stairs.  Choose a carpet that does not hide the edge of the steps on the stairs.  Check carpeting to make sure that it is firmly attached to the stairs. Fix carpet that is loose or worn.  What can I do on the outside of my home?  Use  bright outdoor lighting.  Fix the edges of walkways and driveways and fix any cracks.  Remove anything that might make you trip as you walk through a door, such as a raised step or threshold.  Trim any bushes or trees on paths to your home.  Check to see if handrails are loose or broken and that both sides of all steps have handrails.  Install guardrails along the edges of any raised decks and porches.  Clear paths of anything that can make you trip, such as tools or rocks.  Have leaves, snow, or ice cleared regularly.  Use sand or salt on paths during winter.  Clean up any spills in your garage right away. This includes grease or oil spills.  What other actions can I take?  Wear shoes that:  Have a low heel. Do not wear high heels.  Have rubber bottoms.  Feel good on your feet and fit well.  Are closed at the toe. Do not wear open-toe sandals.  Use tools that help you move around if needed. These include:  Canes.  Walkers.  Scooters.  Crutches.  Review your medicines with your doctor. Some medicines can make you feel dizzy. This can increase your chance of falling.  Ask your doctor what else you can do to help prevent falls.  Where to find more information  Centers for Disease Control and Prevention, STEADI: www.cdc.gov  National [place] on Aging: www.shakira.nih.gov  Contact a doctor if:  You are afraid of falling at home.  You feel weak, drowsy, or dizzy at home.  You fall at home.  Summary  There are many simple things that you can do to make your home safe and to help prevent falls.  Ways to make your home safe include removing things that can make you trip and installing grab bars in the bathroom.  Ask for help when making these changes in your home.  This information is not intended to replace advice given to you by your health care provider. Make sure you discuss any questions you have with your health care provider.  Document Revised: 07/21/2021 Document Reviewed: 07/21/2021  [employer] Patient Education © 2021  [employer]            Advance Care Planning    Advance Directive is on file.  ACP discussion was held with the patient during this visit. Patient has an advance directive in EMR which is still valid.   Discussion with Patient regarding advanced directives. POST form discussed at length and reviewed with patient. POST reviewed and updated today. I spent 17 minutes  with patient reviewing information and documenting  in the chart.  Patient states he does want CPR. Reviewed medical interventions with patient and the differences between each: Comfort, Limited and Full. Patient opted for Full. Discussed the use of antibiotics at the end of life. He chose to use antibiotics consistent with treatment goals. Discussed artificially administered nutrition, patient is aware that if he is alert and oriented they can change their mind at any time. However, they have elected to have no artificial nutrition. Patient has identified his spouse Jaylen Aviles as his healthcare representative. Advised to discuss with healthcare representative if they can comply with wishes. Patient encouraged to have a meeting to discuss his decision regarding advanced care directives and goals of care with extended family and significant  friends  In regard to the POST form:The patient opted to complete POST while in the office and copy scanned into the chart. and advised to give to family members, place in an easily accessible place and take with them if going to the hospital or Emergency room.      Follow Up:   Future Appointments           Provider Department Center     10/25/2023 9:00 AM [facility]     10/30/2023 8:45 AM Zhuo Keen MD [facility]     11/27/2023 8:00 AM Zhou Keen MD [facility]            An After Visit Summary and PPPS were made available to the patient.      Diagnoses and all orders for this  visit:    1. Depression screen (Primary)  Assessment & Plan:  Completed completed October 19, 2023 patient is low risk      2. Immunization counseling  Overview:  UTCHARLI ACOSTA-Diego 5-3-2016    Assessment & Plan:  Pt. Declines COVID, Shingles, Flu and Pneu 20      3. Medicare annual wellness visit, initial  Assessment & Plan:  Completed today      4. Advance care planning  Overview:  Completed and scanned to the chart      5. Colon cancer screening  Assessment & Plan:  Patient requested colonoscopy by myself-- Instructed of benefits and risks, including bleeding, perforation and complications of sedation. Op consent signed. Patient given verbal and written instruction sheet for prep.

## 2023-10-20 LAB
ALBUMIN SERPL-MCNC: 4.8 G/DL (ref 3.9–4.9)
ALBUMIN/CREAT UR: 4 MG/G CREAT (ref 0–29)
ALBUMIN/GLOB SERPL: 1.7 {RATIO} (ref 1.2–2.2)
ALP SERPL-CCNC: 68 IU/L (ref 44–121)
ALT SERPL-CCNC: 19 IU/L (ref 0–44)
APPEARANCE UR: CLEAR
AST SERPL-CCNC: 16 IU/L (ref 0–40)
BILIRUB SERPL-MCNC: 0.7 MG/DL (ref 0–1.2)
BILIRUB UR QL STRIP: NEGATIVE
BUN SERPL-MCNC: 19 MG/DL (ref 8–27)
BUN/CREAT SERPL: 27 (ref 10–24)
CALCIUM SERPL-MCNC: 9.3 MG/DL (ref 8.6–10.2)
CHLORIDE SERPL-SCNC: 101 MMOL/L (ref 96–106)
CHOLEST SERPL-MCNC: 163 MG/DL (ref 100–199)
CHOLEST/HDLC SERPL: 3.6 RATIO (ref 0–5)
CK SERPL-CCNC: 50 U/L (ref 41–331)
CO2 SERPL-SCNC: 24 MMOL/L (ref 20–29)
COLOR UR: YELLOW
CREAT SERPL-MCNC: 0.7 MG/DL (ref 0.76–1.27)
CREAT UR-MCNC: 88 MG/DL
EGFRCR SERPLBLD CKD-EPI 2021: 101 ML/MIN/1.73
GLOBULIN SER CALC-MCNC: 2.8 G/DL (ref 1.5–4.5)
GLUCOSE SERPL-MCNC: 126 MG/DL (ref 70–99)
GLUCOSE UR QL STRIP: NEGATIVE
HBA1C MFR BLD: 6.7 % (ref 4.8–5.6)
HDLC SERPL-MCNC: 45 MG/DL
HGB UR QL STRIP: NEGATIVE
KETONES UR QL STRIP: NEGATIVE
LDLC SERPL CALC-MCNC: 87 MG/DL (ref 0–99)
LEUKOCYTE ESTERASE UR QL STRIP: NEGATIVE
MICROALBUMIN UR-MCNC: 3.8 UG/ML
NITRITE UR QL STRIP: NEGATIVE
PH UR STRIP: 6.5 [PH] (ref 5–7.5)
POTASSIUM SERPL-SCNC: 4.8 MMOL/L (ref 3.5–5.2)
PROT SERPL-MCNC: 7.6 G/DL (ref 6–8.5)
PROT UR QL STRIP: NEGATIVE
PSA SERPL-MCNC: 0.6 NG/ML (ref 0–4)
SODIUM SERPL-SCNC: 140 MMOL/L (ref 134–144)
SP GR UR STRIP: 1.02 (ref 1–1.03)
TRIGL SERPL-MCNC: 181 MG/DL (ref 0–149)
UROBILINOGEN UR STRIP-MCNC: 0.2 MG/DL (ref 0.2–1)
VLDLC SERPL CALC-MCNC: 31 MG/DL (ref 5–40)

## 2023-10-21 PROBLEM — Z71.89 ADVANCE CARE PLANNING: Status: ACTIVE | Noted: 2023-10-21

## 2023-10-21 NOTE — ASSESSMENT & PLAN NOTE
Patient requested colonoscopy by myself-- Instructed of benefits and risks, including bleeding, perforation and complications of sedation. Op consent signed. Patient given verbal and written instruction sheet for prep.

## 2023-10-24 ENCOUNTER — TELEPHONE (OUTPATIENT)
Dept: FAMILY MEDICINE CLINIC | Facility: CLINIC | Age: 68
End: 2023-10-24
Payer: MEDICARE

## 2023-10-24 DIAGNOSIS — Z12.11 COLON CANCER SCREENING: Primary | ICD-10-CM

## 2023-10-24 NOTE — TELEPHONE ENCOUNTER
Sachin called stating Jeffrey would not be able to make it to the Colonoscopy in November. She is requesting to do a Cologuard instead. Please advise

## 2023-10-25 ENCOUNTER — HOSPITAL ENCOUNTER (OUTPATIENT)
Dept: CT IMAGING | Facility: HOSPITAL | Age: 68
Discharge: HOME | End: 2023-10-25
Admitting: FAMILY MEDICINE
Payer: MEDICARE

## 2023-10-25 PROCEDURE — 71250 CT THORAX DX C-: CPT

## 2023-10-30 ENCOUNTER — OFFICE VISIT (OUTPATIENT)
Dept: FAMILY MEDICINE CLINIC | Facility: CLINIC | Age: 68
End: 2023-10-30
Payer: MEDICARE

## 2023-10-30 VITALS
OXYGEN SATURATION: 98 % | TEMPERATURE: 97.5 F | WEIGHT: 226.6 LBS | SYSTOLIC BLOOD PRESSURE: 130 MMHG | HEIGHT: 71 IN | RESPIRATION RATE: 18 BRPM | DIASTOLIC BLOOD PRESSURE: 82 MMHG | BODY MASS INDEX: 31.72 KG/M2 | HEART RATE: 65 BPM

## 2023-10-30 DIAGNOSIS — Z00.01 ENCOUNTER FOR GENERAL ADULT MEDICAL EXAMINATION WITH ABNORMAL FINDINGS: ICD-10-CM

## 2023-10-30 DIAGNOSIS — M53.9 MULTILEVEL DEGENERATIVE DISC DISEASE: ICD-10-CM

## 2023-10-30 DIAGNOSIS — J44.89 ASTHMATIC BRONCHITIS , CHRONIC: ICD-10-CM

## 2023-10-30 DIAGNOSIS — R35.1 NOCTURIA: ICD-10-CM

## 2023-10-30 DIAGNOSIS — N52.9 ERECTILE DYSFUNCTION, UNSPECIFIED ERECTILE DYSFUNCTION TYPE: ICD-10-CM

## 2023-10-30 DIAGNOSIS — M12.9 ARTHRITIS, MULTIPLE JOINT INVOLVEMENT: ICD-10-CM

## 2023-10-30 DIAGNOSIS — E11.43 TYPE 2 DIABETES MELLITUS WITH DIABETIC AUTONOMIC NEUROPATHY, WITHOUT LONG-TERM CURRENT USE OF INSULIN: ICD-10-CM

## 2023-10-30 DIAGNOSIS — Z77.090 HISTORY OF ASBESTOS EXPOSURE: ICD-10-CM

## 2023-10-30 DIAGNOSIS — J12.82 PNEUMONIA DUE TO COVID-19 VIRUS: ICD-10-CM

## 2023-10-30 DIAGNOSIS — Z12.5 SCREENING PSA (PROSTATE SPECIFIC ANTIGEN): ICD-10-CM

## 2023-10-30 DIAGNOSIS — Z71.89 ADVANCE CARE PLANNING: ICD-10-CM

## 2023-10-30 DIAGNOSIS — M79.10 MYALGIA: ICD-10-CM

## 2023-10-30 DIAGNOSIS — Z83.719 FAMILY HISTORY OF POLYPS IN THE COLON: ICD-10-CM

## 2023-10-30 DIAGNOSIS — M50.20 DISPLACEMENT OF CERVICAL INTERVERTEBRAL DISC: ICD-10-CM

## 2023-10-30 DIAGNOSIS — E66.3 OVERWEIGHT: ICD-10-CM

## 2023-10-30 DIAGNOSIS — J30.1 CHRONIC ALLERGIC RHINITIS DUE TO POLLEN: ICD-10-CM

## 2023-10-30 DIAGNOSIS — Z71.85 IMMUNIZATION COUNSELING: ICD-10-CM

## 2023-10-30 DIAGNOSIS — Z12.11 COLON CANCER SCREENING: ICD-10-CM

## 2023-10-30 DIAGNOSIS — H83.3X3 NOISE-INDUCED HEARING LOSS OF BOTH EARS: ICD-10-CM

## 2023-10-30 DIAGNOSIS — E78.2 MIXED HYPERLIPIDEMIA: Primary | ICD-10-CM

## 2023-10-30 DIAGNOSIS — R20.2 PARESTHESIA: ICD-10-CM

## 2023-10-30 DIAGNOSIS — U07.1 PNEUMONIA DUE TO COVID-19 VIRUS: ICD-10-CM

## 2023-10-30 DIAGNOSIS — M51.9 LUMBAR DISC DISEASE: ICD-10-CM

## 2023-10-30 DIAGNOSIS — J45.20 MILD INTERMITTENT ASTHMA WITHOUT COMPLICATION: ICD-10-CM

## 2023-10-30 DIAGNOSIS — R91.1 PULMONARY NODULE: ICD-10-CM

## 2023-10-30 RX ORDER — CYCLOBENZAPRINE HCL 10 MG
10 TABLET ORAL
Start: 2023-10-30

## 2023-10-30 RX ORDER — ASPIRIN 81 MG/1
81 TABLET ORAL DAILY
Start: 2023-10-30

## 2023-10-30 RX ORDER — TADALAFIL 5 MG/1
5 TABLET ORAL DAILY PRN
Start: 2023-10-30

## 2023-10-30 RX ORDER — LORATADINE 10 MG/1
10 TABLET ORAL DAILY
Start: 2023-10-30

## 2023-10-30 RX ORDER — ALBUTEROL SULFATE 90 UG/1
2 AEROSOL, METERED RESPIRATORY (INHALATION) EVERY 4 HOURS PRN
Start: 2023-10-30

## 2023-10-30 RX ORDER — IBUPROFEN 800 MG/1
800 TABLET ORAL EVERY 8 HOURS PRN
Start: 2023-10-30

## 2023-10-30 RX ORDER — FLUTICASONE PROPIONATE 50 MCG
2 SPRAY, SUSPENSION (ML) NASAL DAILY
Start: 2023-10-30

## 2023-10-30 RX ORDER — ATORVASTATIN CALCIUM 20 MG/1
20 TABLET, FILM COATED ORAL EVERY EVENING
Start: 2023-10-30

## 2023-10-30 RX ORDER — MOMETASONE FUROATE 100 UG/1
1 AEROSOL RESPIRATORY (INHALATION) DAILY
Start: 2023-10-30

## 2023-10-30 NOTE — ASSESSMENT & PLAN NOTE
Doing well.  Patient tolerated Flexeril and IBU PRN well without side effects. I feel the benefits of the medication outweigh the risks.

## 2023-10-30 NOTE — ASSESSMENT & PLAN NOTE
Patient's (Body mass index is 31.6 kg/m².) indicates that they are overweight with health conditions that include diabetes mellitus and dyslipidemias . Weight is improving with lifestyle modifications. BMI is is above average; BMI management plan is completed. We discussed portion control and increasing exercise.

## 2023-10-30 NOTE — ASSESSMENT & PLAN NOTE
Doing well.  Patient tolerated Albuterol and Asmanex well without side effects. I feel the benefits of the medication outweigh the risks.   Advised to avoid dust and smoke exposure

## 2023-10-30 NOTE — ASSESSMENT & PLAN NOTE
Lipid and CMP done 10-, read by me, reviewed with pt.ig. 181 up from 129, Tot. Chol. 163 up from 120, HDL 45 up from 34, LDL 87 up from 63  Stable.   Encouraged to watch fatty intake, exercise more, and lose weight.   Compliant with medication.  Patient tolerated Lipitor well without side effects. I feel the benefits of the medication outweigh the risks.   Is getting adequate diet and exercise  Goals developed at last visit were met   Follow up in 3 months  Care management needs are self-addressed.  Self-management abilities addressed and patient is capable of managing his own disease.

## 2023-10-30 NOTE — ASSESSMENT & PLAN NOTE
A1c and Urine microalbumin/Creatrinine ratio done 10-, read by me, reviewed with pt.  A1c was 6.7 up from 6.1, Microalbumin/Creatinine ratio was 4 which is normal  Worsening.   Encouraged to watch sugar intake, exercise more and lose weight.   Compliant with medication.   Patient tolerated Metformin well without side effects. I feel the benefits of the medication outweigh the risks.   Not monitoring sugar at home.   Follow up in 3 months  Care management needs are self-addressed. . Self-management abilities addressed and patient is capable of managing his own disease.  Benefits and risk of aspirin discussed this is somewhat borderline) benefits outweigh the risk

## 2023-10-30 NOTE — PROGRESS NOTES
Subjective   Jeffrey Aviles JR is a 68 y.o. male here for his annual physical with me. Jeffrey is here for coordination of medical care, to discuss health maintenance, disease prevention as well as to followup on medical problems. Patient has been followed by me since 1986. Patient's last CPE was 9-. Activity level is heavy. Exercises 0 per week. Appetite is good. Feels well with many complaints. Energy level is good. Sleeps well. Patient's last colonoscopy was 6-6-2016 by myself, due to work schedule he prefers a cologuard which has been ordered. He is advised to repeat in 5-6. Patient is doing routine self skin exam occasionally. Patient is doing routine self-breast exams occasionally. Patient is checking testicles occasionally.  I encouraged him to check his skin breast and testicles monthly  Lab Results   Component Value Date    PSA 0.6 10/19/2023        History of Present Illness  Follow up allergies.    Follow up pulmonary nodules.  Hyperlipidemia  The current episode started more than 1 year ago. The problem is controlled. Recent lipid tests were reviewed and are high. Factors aggravating his hyperlipidemia include fatty foods. Pertinent negatives include no chest pain, myalgias or shortness of breath. Current antihyperlipidemic treatment includes statins. The current treatment provides no improvement of lipids. There are no compliance problems.  Risk factors for coronary artery disease include diabetes mellitus and dyslipidemia.   Diabetes  He presents for his follow-up diabetic visit. He has type 2 diabetes mellitus. His disease course has been worsening. Pertinent negatives for hypoglycemia include no dizziness, nervousness/anxiousness or speech difficulty. Pertinent negatives for diabetes include no blurred vision, no chest pain, no fatigue, no polydipsia, no polyphagia, no polyuria, no weakness and no weight loss. Risk factors for coronary artery disease include dyslipidemia, diabetes mellitus and  obesity. Current diabetic treatment includes oral agent (monotherapy). He is compliant with treatment all of the time. Eye exam is current.   Arthritis  Presents for follow-up visit. He complains of pain. He reports no joint swelling. His pain is at a severity of 0/10. Pertinent negatives include no diarrhea, dysuria, fatigue, fever, rash or weight loss. Compliance with total regimen is %. Compliance with medications is %.        The following portions of the patient's history were reviewed and updated as appropriate: allergies, current medications, past family history, past medical history, past social history, past surgical history, and problem list.    Past Medical History:   Diagnosis Date    Abnormal chest CT     Bilateral hearing loss     History of asbestos exposure     Mixed hyperlipidemia        Family History   Problem Relation Age of Onset    Cerebral aneurysm Mother          at 42    Hypertension Mother     Hyperlipidemia Father     Hypertension Father     Other Father         spinal stenosis    Asthma Father         living at 86    Prostate cancer Father     Cancer Father         Prostate cancer  at 87    Breast cancer Sister     Hyperlipidemia Sister     Prostate cancer Maternal Grandfather     Diabetes Maternal Uncle     Heart disease Maternal Uncle     Lung cancer Maternal Uncle     Hypertension Brother     Hyperlipidemia Brother     Other Brother 30        Hepatitis    Stroke Maternal Grandmother        Social History     Socioeconomic History    Marital status:    Tobacco Use    Smoking status: Never     Passive exposure: Never    Smokeless tobacco: Never   Substance and Sexual Activity    Alcohol use: No    Drug use: No    Sexual activity: Yes     Partners: Female       Social History     Social History Narrative     2 x.  First wife  for 2.5 years and no children with her.   2nd in  has 1 son together and he adopted his wives daughter.  Just  the 2 of them at home, children are out of the house.  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  Caffeine  3-4 cups coffee daily.  Wears seatbelts 65% of the time.  Exercise none.  Hobbies building cigar box guitars, wind chimes and fountains.        Past Surgical History:   Procedure Laterality Date    COLONOSCOPY  06/2016    Repeat 6-21-6-22    PAROTID DUCT LIGATION         No current outpatient medications on file prior to visit.     No current facility-administered medications on file prior to visit.       No Known Allergies    Review of Systems   Constitutional:  Negative for appetite change, chills, fatigue, fever, unexpected weight gain and unexpected weight loss.   HENT:  Negative for congestion, dental problem, ear discharge, ear pain, hearing loss, nosebleeds, postnasal drip, rhinorrhea, sinus pressure, sneezing, sore throat, tinnitus and voice change.         Last dental exam 7-2023, [facility], pt unsure of name.   Eyes:  Negative for blurred vision, double vision, pain, redness and visual disturbance.        Last vision exam 2-2023, Dr. Castellanos-Doing well   Respiratory:  Negative for cough, shortness of breath, wheezing and stridor.    Cardiovascular:  Negative for chest pain, palpitations and leg swelling.   Gastrointestinal:  Negative for abdominal pain, anal bleeding, blood in stool, constipation, diarrhea, nausea, rectal pain, vomiting, GERD and indigestion.        21-28 BM weekly   Endocrine: Negative for cold intolerance, heat intolerance, polydipsia, polyphagia and polyuria.        Sex drive  He is a 4  She is a 1   Genitourinary:  Negative for difficulty urinating, dysuria, frequency, hematuria and urgency.   Musculoskeletal:  Positive for arthritis. Negative for back pain, joint swelling, myalgias, neck pain and neck stiffness.   Skin:  Negative for color change, dry skin and rash.   Neurological:  Negative for dizziness,  "syncope, speech difficulty, weakness, light-headedness, headache and memory problem.   Hematological:  Does not bruise/bleed easily.   Psychiatric/Behavioral:  Negative for decreased concentration, sleep disturbance, depressed mood and stress. The patient is not nervous/anxious.        Objective   Visit Vitals  /82 (BP Location: Left arm, Patient Position: Sitting, Cuff Size: Adult)   Pulse 65   Temp 97.5 °F (36.4 °C) (Temporal)   Resp 18   Ht 180.3 cm (71\")   Wt 103 kg (226 lb 9.6 oz)   SpO2 98%   BMI 31.60 kg/m²     Physical Exam  Constitutional:       General: He is not in acute distress.     Appearance: He is well-developed. He is not diaphoretic.   HENT:      Head: Normocephalic.      Right Ear: Hearing, tympanic membrane, ear canal and external ear normal.      Left Ear: Hearing, tympanic membrane, ear canal and external ear normal.      Nose: Nose normal.      Mouth/Throat:      Lips: Pink.      Mouth: Mucous membranes are moist.      Pharynx: Oropharynx is clear. No oropharyngeal exudate.   Eyes:      General: Lids are normal. No scleral icterus.        Right eye: No discharge.         Left eye: No discharge.      Extraocular Movements: Extraocular movements intact.      Conjunctiva/sclera: Conjunctivae normal.      Pupils: Pupils are equal, round, and reactive to light.      Comments: Wears glasses.   Neck:      Trachea: Trachea normal.   Cardiovascular:      Rate and Rhythm: Normal rate and regular rhythm.      Pulses:           Dorsalis pedis pulses are 1+ on the right side and 1+ on the left side.        Posterior tibial pulses are 0 on the right side and 1+ on the left side.      Heart sounds: Normal heart sounds. No murmur heard.  Pulmonary:      Effort: Pulmonary effort is normal.      Breath sounds: Normal breath sounds. No wheezing.   Chest:      Chest wall: No tenderness.   Breasts:     Right: Normal. No swelling, bleeding, inverted nipple, mass, nipple discharge, skin change or tenderness. "      Left: Normal. No swelling, bleeding, inverted nipple, mass, nipple discharge, skin change or tenderness.   Abdominal:      General: Bowel sounds are normal. There is no distension.      Palpations: Abdomen is soft. There is no mass.      Tenderness: There is no abdominal tenderness.      Hernia: No hernia is present.   Genitourinary:     Penis: Normal and uncircumcised. No tenderness.       Testes: Normal.      Prostate: Normal.      Rectum: Normal.   Musculoskeletal:         General: No tenderness or deformity. Normal range of motion.      Cervical back: Full passive range of motion without pain, normal range of motion and neck supple.   Lymphadenopathy:      Cervical: No cervical adenopathy.   Skin:     General: Skin is warm and dry.      Findings: No erythema or rash.      Comments: Skin tag bilateral axillae.  Seb. Keratosis scattered over the trunk.  Nevus of the left lower eyelid.  Left buttock large skin tag.  Abrasion right Great toe.   Neurological:      General: No focal deficit present.      Mental Status: He is alert and oriented to person, place, and time.      Cranial Nerves: Cranial nerves 2-12 are intact. No cranial nerve deficit.      Sensory: Sensation is intact. No sensory deficit.      Motor: Motor function is intact. No abnormal muscle tone.      Coordination: Coordination is intact. Coordination normal.      Gait: Gait is intact.      Deep Tendon Reflexes: Reflexes normal.   Psychiatric:         Behavior: Behavior normal.         Thought Content: Thought content normal.         Judgment: Judgment normal.         Assessment & Plan   Problem List Items Addressed This Visit          Medium    Mixed hyperlipidemia - Primary    Current Assessment & Plan     Lipid and CMP done 10-, read by me, reviewed with pt.ig. 181 up from 129, Tot. Chol. 163 up from 120, HDL 45 up from 34, LDL 87 up from 63  Stable.   Encouraged to watch fatty intake, exercise more, and lose weight.   Compliant with  medication.  Patient tolerated Lipitor well without side effects. I feel the benefits of the medication outweigh the risks.   Is getting adequate diet and exercise  Goals developed at last visit were met   Follow up in 3 months  Care management needs are self-addressed.  Self-management abilities addressed and patient is capable of managing his own disease.           Relevant Medications    atorvastatin (LIPITOR) 20 MG tablet    Other Relevant Orders    Lipid Panel With / Chol / HDL Ratio    Comprehensive Metabolic Panel    Type 2 diabetes mellitus with diabetic autonomic neuropathy, without long-term current use of insulin    Overview     Last eye exam 1-2023 with Dr. Castellanos         Current Assessment & Plan     A1c and Urine microalbumin/Creatrinine ratio done 10-, read by me, reviewed with pt.  A1c was 6.7 up from 6.1, Microalbumin/Creatinine ratio was 4 which is normal  Worsening.   Encouraged to watch sugar intake, exercise more and lose weight.   Compliant with medication.   Patient tolerated Metformin well without side effects. I feel the benefits of the medication outweigh the risks.   Not monitoring sugar at home.   Follow up in 3 months  Care management needs are self-addressed. . Self-management abilities addressed and patient is capable of managing his own disease.  Benefits and risk of aspirin discussed this is somewhat borderline) benefits outweigh the risk           Relevant Medications    glucose blood test strip    metFORMIN (GLUCOPHAGE) 1000 MG tablet    Other Relevant Orders    Hemoglobin A1c       Low    Displacement of cervical intervertebral disc    Current Assessment & Plan     Doing well on ibuprofen and Flexeril as needed.  Benefits and risk discussed.         Relevant Medications    ibuprofen (ADVIL,MOTRIN) 800 MG tablet    Mild intermittent asthma without complication    Current Assessment & Plan     Doing well.  Patient tolerated Albuterol and Asmanex well without side effects. I  feel the benefits of the medication outweigh the risks.   Advised to avoid dust and smoke exposure         Relevant Medications    albuterol sulfate  (90 Base) MCG/ACT inhaler    Asmanex  MCG/ACT aerosol    Paresthesia    Overview     Hand and feet         Current Assessment & Plan     Mild but we will do a methylmalonic acid, pt. Declines further work up and treatment.         Relevant Orders    Methylmalonic Acid, Serum       Unprioritized    Advance care planning    Overview     Completed and scanned to the chart         Arthritis, multiple joint involvement    Current Assessment & Plan     Doing well.  Patient tolerated IBU PRN well without side effects. I feel the benefits of the medication outweigh the risks.          Bilateral hearing loss    Current Assessment & Plan     Stable.  Advised to wear hearing protection and avopid noise exposure         Chronic allergic rhinitis due to pollen    Current Assessment & Plan     Stable.  Patient tolerated Claritin and Flonase well without side effects. I feel the benefits of the medication outweigh the risks.          Relevant Medications    fluticasone (FLONASE) 50 MCG/ACT nasal spray    ibuprofen (ADVIL,MOTRIN) 800 MG tablet    loratadine (CLARITIN) 10 MG tablet    Colon cancer screening    Current Assessment & Plan     Patient did cologuard and waiting on results         Encounter for general adult medical examination with abnormal findings    Current Assessment & Plan     Encouraged to do self-breast exam, self-testicle exams, and self derm exams. Congratulated on using seat belts.  Encouraged to do annual physical exams.  Immunization status reviewed.           Erectile dysfunction    Overview     Minimal--Cialis does well but his wife has dyspareunia thus does not use the Cialis very often         Relevant Medications    tadalafil (Cialis) 5 MG tablet    Family history of polyps in the colon    Overview     Last colonoscopy done 6-2016          History of asbestos exposure    Overview       Short peroid of time at age 19         Immunization counseling    Overview       T-Dap 5-3-2016  Due for COVID Shingrix flu and pneumo 20 but he declines them all         Lumbar disc disease    Overview     Found on x-ray done August 16, 2022 and reviewed with patient         Current Assessment & Plan     Doing well.  Patient tolerated Flexeril and IBU PRN well without side effects. I feel the benefits of the medication outweigh the risks.          Relevant Medications    cyclobenzaprine (FLEXERIL) 10 MG tablet    Myalgia    Current Assessment & Plan     Doing well.  CPK done 10-, read by me, reviewed with pt.  CPK was 50 down from 64         Overweight    Current Assessment & Plan     Patient's (Body mass index is 31.6 kg/m².) indicates that they are overweight with health conditions that include diabetes mellitus and dyslipidemias . Weight is improving with lifestyle modifications. BMI is is above average; BMI management plan is completed. We discussed portion control and increasing exercise.          Pulmonary nodule    Overview     CT low dose chest 10-25-23         Current Assessment & Plan     CT low dose done 0-25-23, results reviewed with pt.  Nodules stable         Relevant Medications    albuterol sulfate  (90 Base) MCG/ACT inhaler    Asmanex  MCG/ACT aerosol    fluticasone (FLONASE) 50 MCG/ACT nasal spray    loratadine (CLARITIN) 10 MG tablet    Screening PSA (prostate specific antigen)    Current Assessment & Plan     Doing well.  PSA done 10-, read by me, reviewed with pt.  PSA 0.6 down from 0.8          Other Visit Diagnoses       Multilevel degenerative disc disease        Duplicate diagnoses thus will delete    Relevant Medications    cyclobenzaprine (FLEXERIL) 10 MG tablet    Asthmatic bronchitis , chronic        Resolved, thus delete    Relevant Medications    albuterol sulfate  (90 Base) MCG/ACT inhaler    Asmanex   MCG/ACT aerosol    fluticasone (FLONASE) 50 MCG/ACT nasal spray    loratadine (CLARITIN) 10 MG tablet    Asthmatic bronchitis , chronic        Doing well    Relevant Medications    albuterol sulfate  (90 Base) MCG/ACT inhaler    Asmanex  MCG/ACT aerosol    fluticasone (FLONASE) 50 MCG/ACT nasal spray    loratadine (CLARITIN) 10 MG tablet    Nocturia        Normal for age, thus delete.    Pneumonia due to COVID-19 virus        Resolved, thus delete    Relevant Medications    albuterol sulfate  (90 Base) MCG/ACT inhaler    Asmanex  MCG/ACT aerosol    fluticasone (FLONASE) 50 MCG/ACT nasal spray    loratadine (CLARITIN) 10 MG tablet                 Encouraged to check his skin, testicles and breasts monthly. Reviewed exercising regularly, eating a balanced diet, immunizations and if due, patient counselled to check with insurance company for coverage;, and regularly checking skin and breasts.

## 2023-10-30 NOTE — ASSESSMENT & PLAN NOTE
Stable.  Patient tolerated Claritin and Flonase well without side effects. I feel the benefits of the medication outweigh the risks.

## 2023-10-30 NOTE — ASSESSMENT & PLAN NOTE
Doing well.  Patient tolerated IBU PRN well without side effects. I feel the benefits of the medication outweigh the risks.

## 2023-11-05 PROBLEM — I25.10 CORONARY ARTERY CALCIFICATION SEEN ON CAT SCAN: Status: ACTIVE | Noted: 2023-11-05

## 2023-11-15 ENCOUNTER — TELEPHONE (OUTPATIENT)
Dept: FAMILY MEDICINE CLINIC | Facility: CLINIC | Age: 68
End: 2023-11-15
Payer: MEDICARE

## 2023-11-15 NOTE — TELEPHONE ENCOUNTER
Notified patient that Cologard is positive and that he would need a colonoscopy.  GI packet mailed to patient.  He understands that he needs to complete the packet and return it to [facility] and they will make an appointment for the colonoscopy

## 2023-12-10 DIAGNOSIS — E11.43 TYPE 2 DIABETES MELLITUS WITH DIABETIC AUTONOMIC NEUROPATHY, WITHOUT LONG-TERM CURRENT USE OF INSULIN: ICD-10-CM

## 2023-12-10 DIAGNOSIS — E78.2 MIXED HYPERLIPIDEMIA: ICD-10-CM

## 2023-12-11 RX ORDER — ATORVASTATIN CALCIUM 20 MG/1
20 TABLET, FILM COATED ORAL EVERY EVENING
Qty: 90 TABLET | Refills: 0 | Status: SHIPPED | OUTPATIENT
Start: 2023-12-11

## 2024-02-28 LAB
ALBUMIN SERPL-MCNC: 4.4 G/DL (ref 3.9–4.9)
ALBUMIN/GLOB SERPL: 1.6 {RATIO} (ref 1.2–2.2)
ALP SERPL-CCNC: 65 IU/L (ref 44–121)
ALT SERPL-CCNC: 15 IU/L (ref 0–44)
AST SERPL-CCNC: 13 IU/L (ref 0–40)
BILIRUB SERPL-MCNC: 0.7 MG/DL (ref 0–1.2)
BUN SERPL-MCNC: 23 MG/DL (ref 8–27)
BUN/CREAT SERPL: 30 (ref 10–24)
CALCIUM SERPL-MCNC: 9.1 MG/DL (ref 8.6–10.2)
CHLORIDE SERPL-SCNC: 104 MMOL/L (ref 96–106)
CHOLEST SERPL-MCNC: 126 MG/DL (ref 100–199)
CHOLEST/HDLC SERPL: 3.5 RATIO (ref 0–5)
CO2 SERPL-SCNC: 24 MMOL/L (ref 20–29)
CREAT SERPL-MCNC: 0.76 MG/DL (ref 0.76–1.27)
EGFRCR SERPLBLD CKD-EPI 2021: 99 ML/MIN/1.73
GLOBULIN SER CALC-MCNC: 2.7 G/DL (ref 1.5–4.5)
GLUCOSE SERPL-MCNC: 113 MG/DL (ref 70–99)
HBA1C MFR BLD: 6.1 % (ref 4.8–5.6)
HDLC SERPL-MCNC: 36 MG/DL
LDLC SERPL CALC-MCNC: 68 MG/DL (ref 0–99)
POTASSIUM SERPL-SCNC: 4.9 MMOL/L (ref 3.5–5.2)
PROT SERPL-MCNC: 7.1 G/DL (ref 6–8.5)
SODIUM SERPL-SCNC: 142 MMOL/L (ref 134–144)
TRIGL SERPL-MCNC: 121 MG/DL (ref 0–149)
VLDLC SERPL CALC-MCNC: 22 MG/DL (ref 5–40)

## 2024-03-05 ENCOUNTER — OFFICE VISIT (OUTPATIENT)
Dept: FAMILY MEDICINE CLINIC | Facility: CLINIC | Age: 69
End: 2024-03-05
Payer: MEDICARE

## 2024-03-05 VITALS
HEIGHT: 71 IN | OXYGEN SATURATION: 98 % | WEIGHT: 223.2 LBS | BODY MASS INDEX: 31.25 KG/M2 | DIASTOLIC BLOOD PRESSURE: 80 MMHG | RESPIRATION RATE: 18 BRPM | HEART RATE: 77 BPM | SYSTOLIC BLOOD PRESSURE: 138 MMHG | TEMPERATURE: 97.3 F

## 2024-03-05 DIAGNOSIS — D12.6 ADENOMATOUS POLYP OF COLON, UNSPECIFIED PART OF COLON: ICD-10-CM

## 2024-03-05 DIAGNOSIS — E11.43 TYPE 2 DIABETES MELLITUS WITH DIABETIC AUTONOMIC NEUROPATHY, WITHOUT LONG-TERM CURRENT USE OF INSULIN: ICD-10-CM

## 2024-03-05 DIAGNOSIS — E66.3 OVERWEIGHT: ICD-10-CM

## 2024-03-05 DIAGNOSIS — E78.2 MIXED HYPERLIPIDEMIA: Primary | ICD-10-CM

## 2024-03-05 DIAGNOSIS — J06.9 UPPER RESPIRATORY TRACT INFECTION, UNSPECIFIED TYPE: ICD-10-CM

## 2024-03-05 RX ORDER — AMOXICILLIN 500 MG/1
500 TABLET, FILM COATED ORAL 3 TIMES DAILY
Qty: 30 TABLET | Refills: 0 | Status: SHIPPED | OUTPATIENT
Start: 2024-03-05

## 2024-03-05 RX ORDER — ATORVASTATIN CALCIUM 20 MG/1
20 TABLET, FILM COATED ORAL EVERY EVENING
Qty: 90 TABLET | Refills: 1 | Status: SHIPPED | OUTPATIENT
Start: 2024-03-05

## 2024-03-05 NOTE — ASSESSMENT & PLAN NOTE
New dx.  Advised to increase fluids and rest.  PRN Amoxicillin 500 mg given to patient, if worsens to start.

## 2024-03-05 NOTE — ASSESSMENT & PLAN NOTE
--A1c done 2-, read by me, reviewed with pt. A1cwas 6.1 down from 6.7  Improved.  Patient tolerated Metformin well without side effects. I feel the benefits of the medication outweigh the risks.   Encouraged to watch sugar intake, exercise more and lose weight. Compliant with medication.   Monitoring sugar at home.   Follow up in 3 months  Care management needs are self-addressed.  Self-management abilities addressed and patient is capable of managing his own disease.

## 2024-03-05 NOTE — ASSESSMENT & PLAN NOTE
Patient's (Body mass index is 31.13 kg/m².) indicates that they are overweight with health conditions that include diabetes mellitus and dyslipidemias . Weight is improving with lifestyle modifications. BMI is is above average; BMI management plan is completed. We discussed portion control and increasing exercise.

## 2024-03-05 NOTE — PROGRESS NOTES
Subjective   Jeffrey Aviles JR is a 68 y.o. male.     Hyperlipidemia  This is a chronic problem. The current episode started more than 1 year ago. The problem is controlled. Exacerbating diseases include diabetes. Factors aggravating his hyperlipidemia include fatty foods. Pertinent negatives include no myalgias. Current antihyperlipidemic treatment includes statins. Risk factors for coronary artery disease include dyslipidemia and diabetes mellitus.   Diabetes  He presents for his follow-up diabetic visit. He has type 2 diabetes mellitus. His disease course has been improving. Hypoglycemia symptoms include headaches. Pertinent negatives for diabetes include no fatigue, no polyphagia, no polyuria and no weight loss.   URI   This is a new problem. The current episode started 1 to 4 weeks ago. The problem has been unchanged. There has been no fever. Associated symptoms include congestion, coughing, headaches, rhinorrhea and sneezing. Pertinent negatives include no diarrhea, ear pain, nausea, rash, sore throat, vomiting or wheezing.        The following portions of the patient's history were reviewed and updated as appropriate: allergies, current medications, past family history, past medical history, past social history, past surgical history, and problem list.    Family History   Problem Relation Age of Onset    Cerebral aneurysm Mother          at 42    Hypertension Mother     Hyperlipidemia Father     Hypertension Father     Other Father         spinal stenosis    Asthma Father         living at 86    Prostate cancer Father     Cancer Father         Prostate cancer  at 87    Breast cancer Sister     Hyperlipidemia Sister     Cancer Brother 63        Prostate    Hypertension Brother     Hyperlipidemia Brother     Other Brother 30        Hepatitis    Diabetes Maternal Uncle     Heart disease Maternal Uncle     Lung cancer Maternal Uncle     Stroke Maternal Grandmother     Prostate cancer Maternal  Grandfather        Social History     Tobacco Use    Smoking status: Never     Passive exposure: Never    Smokeless tobacco: Never   Substance Use Topics    Alcohol use: No    Drug use: No       Past Surgical History:   Procedure Laterality Date    COLONOSCOPY  06/2016    Repeat 6-21-6-22    PAROTID DUCT LIGATION         Patient Active Problem List   Diagnosis    Bilateral hearing loss    Chronic allergic rhinitis due to pollen    Displacement of cervical intervertebral disc    History of asbestos exposure    Mixed hyperlipidemia    Overweight    Mild intermittent asthma without complication    Type 2 diabetes mellitus with diabetic autonomic neuropathy, without long-term current use of insulin    Screening PSA (prostate specific antigen)    Arthritis, multiple joint involvement    Family history of polyps in the colon    Myalgia    Encounter for general adult medical examination with abnormal findings    Erectile dysfunction    Pulmonary nodule    Lumbar disc disease    Paresthesia    Medicare annual wellness visit, initial    Depression screen    Immunization counseling    Colon cancer screening    Advance care planning    Coronary artery calcification seen on CAT scan    Adenomatous colon polyp    URI (upper respiratory infection)       Current Outpatient Medications on File Prior to Visit   Medication Sig Dispense Refill    albuterol sulfate  (90 Base) MCG/ACT inhaler Inhale 2 puffs Every 4 (Four) Hours As Needed for Wheezing.      Asmanex  MCG/ACT aerosol Inhale 1 inhaler Daily.      aspirin 81 MG EC tablet Take 1 tablet by mouth Daily.      fluticasone (FLONASE) 50 MCG/ACT nasal spray 2 sprays into the nostril(s) as directed by provider Daily.      glucose blood test strip Blood sugar 1 x daily      ibuprofen (ADVIL,MOTRIN) 800 MG tablet Take 1 tablet by mouth Every 8 (Eight) Hours As Needed for Mild Pain.      loratadine (CLARITIN) 10 MG tablet Take 1 tablet by mouth Daily.      tadalafil  "(Cialis) 5 MG tablet Take 1 tablet by mouth Daily As Needed for Erectile Dysfunction. Take 4 pills on 1st day then 1 daily      cyclobenzaprine (FLEXERIL) 10 MG tablet Take 1 tablet by mouth every night at bedtime. (Patient not taking: Reported on 3/5/2024)       No current facility-administered medications on file prior to visit.       No Known Allergies    Review of Systems   Constitutional:  Negative for fatigue, fever, unexpected weight gain and unexpected weight loss.   HENT:  Positive for congestion, rhinorrhea and sneezing. Negative for ear pain, sore throat and voice change.    Eyes:  Negative for visual disturbance.   Respiratory:  Positive for cough. Negative for wheezing.    Cardiovascular:  Negative for leg swelling.   Gastrointestinal:  Negative for diarrhea, nausea and vomiting.   Endocrine: Negative for polyphagia and polyuria.   Genitourinary:  Negative for frequency.   Musculoskeletal:  Negative for myalgias.   Skin:  Negative for dry skin, rash and skin lesions.   Neurological:  Negative for syncope, numbness and headache.       Objective   Visit Vitals  /80 (BP Location: Left arm, Patient Position: Sitting, Cuff Size: Adult)   Pulse 77   Temp 97.3 °F (36.3 °C) (Temporal)   Resp 18   Ht 180.3 cm (71\")   Wt 101 kg (223 lb 3.2 oz)   SpO2 98%   BMI 31.13 kg/m²     Physical Exam  Vitals and nursing note reviewed.   Constitutional:       Appearance: He is well-developed.   HENT:      Head: Normocephalic.      Right Ear: Tympanic membrane, ear canal and external ear normal. No middle ear effusion. There is no impacted cerumen.      Left Ear: Tympanic membrane, ear canal and external ear normal.  No middle ear effusion. There is no impacted cerumen.      Nose: No septal deviation, mucosal edema or congestion.      Right Sinus: No maxillary sinus tenderness or frontal sinus tenderness.      Left Sinus: No maxillary sinus tenderness or frontal sinus tenderness.      Mouth/Throat:      Mouth: No oral " lesions.      Pharynx: No oropharyngeal exudate.      Tonsils: No tonsillar abscesses.   Neck:      Thyroid: No thyromegaly.      Vascular: No carotid bruit.      Trachea: Trachea normal.   Cardiovascular:      Rate and Rhythm: Normal rate and regular rhythm.      Heart sounds: No murmur heard.     No friction rub. No gallop.   Pulmonary:      Effort: Pulmonary effort is normal. No respiratory distress.      Breath sounds: Normal breath sounds. No wheezing.   Chest:      Chest wall: No tenderness.   Musculoskeletal:      Cervical back: Neck supple.   Skin:     General: Skin is dry.      Findings: No rash.      Nails: There is no clubbing.   Neurological:      Mental Status: He is alert and oriented to person, place, and time.   Psychiatric:         Behavior: Behavior is cooperative.           Assessment & Plan .  Problem List Items Addressed This Visit          Medium    Mixed hyperlipidemia - Primary    Current Assessment & Plan     Lipid and CMP done 2-, read by me, reviewed with pt.  Trig.  121 down from 181, Tot.chol. 126 down from 163, HDL 36 down from 45, LDL 68 down from 87.  Improved.  Patient tolerated Lipitor well without side effects. I feel the benefits of the medication outweigh the risks.   Encouraged to watch fatty intake, exercise more, and lose weight. Compliant with medication   Is getting adequate diet and exercise  Goals developed at last visit were met   Follow up in  3 months  Care management needs are self-addressed. Self-management abilities addressed and patient is capable of managing his own disease.           Relevant Medications    atorvastatin (LIPITOR) 20 MG tablet    Other Relevant Orders    Lipid Panel With / Chol / HDL Ratio    Comprehensive Metabolic Panel    Type 2 diabetes mellitus with diabetic autonomic neuropathy, without long-term current use of insulin    Overview     Last eye exam 1-2023 with Dr. Castellanos         Current Assessment & Plan     --A1c done 2-, read  by me, reviewed with pt. A1cwas 6.1 down from 6.7  Improved.  Patient tolerated Metformin well without side effects. I feel the benefits of the medication outweigh the risks.   Encouraged to watch sugar intake, exercise more and lose weight. Compliant with medication.   Monitoring sugar at home.   Follow up in 3 months  Care management needs are self-addressed.  Self-management abilities addressed and patient is capable of managing his own disease.           Relevant Medications    metFORMIN (GLUCOPHAGE) 1000 MG tablet    Other Relevant Orders    Hemoglobin A1c       Unprioritized    Adenomatous colon polyp    Overview     1-30-24 by Dr. Magdaleno-advised  to repeat 1-2031         Current Assessment & Plan     New dx. Advised to repeat colonoscopy 7 years.         Overweight    Current Assessment & Plan     Patient's (Body mass index is 31.13 kg/m².) indicates that they are overweight with health conditions that include diabetes mellitus and dyslipidemias . Weight is improving with lifestyle modifications. BMI is is above average; BMI management plan is completed. We discussed portion control and increasing exercise.          URI (upper respiratory infection)    Current Assessment & Plan     New dx.  Advised to increase fluids and rest.  PRN Amoxicillin 500 mg given to patient, if worsens to start.         Relevant Medications    amoxicillin (AMOXIL) 500 MG tablet

## 2024-03-05 NOTE — ASSESSMENT & PLAN NOTE
Lipid and CMP done 2-, read by me, reviewed with pt.  Trig.  121 down from 181, Tot.chol. 126 down from 163, HDL 36 down from 45, LDL 68 down from 87.  Improved.  Patient tolerated Lipitor well without side effects. I feel the benefits of the medication outweigh the risks.   Encouraged to watch fatty intake, exercise more, and lose weight. Compliant with medication   Is getting adequate diet and exercise  Goals developed at last visit were met   Follow up in  3 months  Care management needs are self-addressed. Self-management abilities addressed and patient is capable of managing his own disease.

## 2024-03-07 LAB — METHYLMALONATE SERPL-SCNC: 96 NMOL/L (ref 0–378)

## 2024-06-14 LAB
ALBUMIN SERPL-MCNC: 4.3 G/DL (ref 3.9–4.9)
ALBUMIN/GLOB SERPL: 1.8 {RATIO}
ALP SERPL-CCNC: 62 IU/L (ref 44–121)
ALT SERPL-CCNC: 16 IU/L (ref 0–44)
AST SERPL-CCNC: 15 IU/L (ref 0–40)
BILIRUB SERPL-MCNC: 0.6 MG/DL (ref 0–1.2)
BUN SERPL-MCNC: 23 MG/DL (ref 8–27)
BUN/CREAT SERPL: 32 (ref 10–24)
CALCIUM SERPL-MCNC: 9 MG/DL (ref 8.6–10.2)
CHLORIDE SERPL-SCNC: 104 MMOL/L (ref 96–106)
CHOLEST SERPL-MCNC: 122 MG/DL (ref 100–199)
CHOLEST/HDLC SERPL: 3 RATIO (ref 0–5)
CO2 SERPL-SCNC: 25 MMOL/L (ref 20–29)
CREAT SERPL-MCNC: 0.72 MG/DL (ref 0.76–1.27)
EGFRCR SERPLBLD CKD-EPI 2021: 100 ML/MIN/1.73
GLOBULIN SER CALC-MCNC: 2.4 G/DL (ref 1.5–4.5)
GLUCOSE SERPL-MCNC: 113 MG/DL (ref 70–99)
HBA1C MFR BLD: 6.1 % (ref 4.8–5.6)
HDLC SERPL-MCNC: 41 MG/DL
LDLC SERPL CALC-MCNC: 63 MG/DL (ref 0–99)
POTASSIUM SERPL-SCNC: 4.7 MMOL/L (ref 3.5–5.2)
PROT SERPL-MCNC: 6.7 G/DL (ref 6–8.5)
SODIUM SERPL-SCNC: 142 MMOL/L (ref 134–144)
TRIGL SERPL-MCNC: 91 MG/DL (ref 0–149)
VLDLC SERPL CALC-MCNC: 18 MG/DL (ref 5–40)

## 2024-06-17 ENCOUNTER — OFFICE VISIT (OUTPATIENT)
Dept: FAMILY MEDICINE CLINIC | Facility: CLINIC | Age: 69
End: 2024-06-17
Payer: MEDICARE

## 2024-06-17 VITALS
SYSTOLIC BLOOD PRESSURE: 136 MMHG | WEIGHT: 211.4 LBS | HEART RATE: 64 BPM | OXYGEN SATURATION: 97 % | RESPIRATION RATE: 14 BRPM | BODY MASS INDEX: 29.6 KG/M2 | DIASTOLIC BLOOD PRESSURE: 76 MMHG | TEMPERATURE: 97.1 F | HEIGHT: 71 IN

## 2024-06-17 DIAGNOSIS — E78.2 MIXED HYPERLIPIDEMIA: Primary | ICD-10-CM

## 2024-06-17 DIAGNOSIS — E11.43 TYPE 2 DIABETES MELLITUS WITH DIABETIC AUTONOMIC NEUROPATHY, WITHOUT LONG-TERM CURRENT USE OF INSULIN: ICD-10-CM

## 2024-06-17 DIAGNOSIS — E66.3 OVERWEIGHT: ICD-10-CM

## 2024-06-17 PROCEDURE — 1126F AMNT PAIN NOTED NONE PRSNT: CPT | Performed by: FAMILY MEDICINE

## 2024-06-17 PROCEDURE — 3044F HG A1C LEVEL LT 7.0%: CPT | Performed by: FAMILY MEDICINE

## 2024-06-17 PROCEDURE — 99214 OFFICE O/P EST MOD 30 MIN: CPT | Performed by: FAMILY MEDICINE

## 2024-06-17 NOTE — PROGRESS NOTES
Subjective   Jeffrey Aviles JR is a 68 y.o. male.     Hyperlipidemia  This is a chronic problem. The current episode started more than 1 year ago. The problem is controlled. Pertinent negatives include no myalgias. Current antihyperlipidemic treatment includes statins.   Diabetes  He presents for his follow-up diabetic visit. He has type 2 diabetes mellitus. His disease course has been improving. Pertinent negatives for diabetes include no polyphagia, no polyuria and no weight loss.        The following portions of the patient's history were reviewed and updated as appropriate: allergies, current medications, past family history, past medical history, past social history, past surgical history, and problem list.    Family History   Problem Relation Age of Onset    Cerebral aneurysm Mother          at 42    Hypertension Mother     Hyperlipidemia Father     Hypertension Father     Other Father         spinal stenosis    Asthma Father         living at 86    Prostate cancer Father     Cancer Father         Prostate cancer  at 87    Breast cancer Sister     Hyperlipidemia Sister     Cancer Brother 63        Prostate    Hypertension Brother     Hyperlipidemia Brother     Other Brother 30        Hepatitis    Diabetes Maternal Uncle     Heart disease Maternal Uncle     Lung cancer Maternal Uncle     Stroke Maternal Grandmother     Prostate cancer Maternal Grandfather        Social History     Tobacco Use    Smoking status: Never     Passive exposure: Never    Smokeless tobacco: Never   Substance Use Topics    Alcohol use: No    Drug use: No       Past Surgical History:   Procedure Laterality Date    COLONOSCOPY  2016    Repeat -    PAROTID DUCT LIGATION         Patient Active Problem List   Diagnosis    Bilateral hearing loss    Chronic allergic rhinitis due to pollen    Displacement of cervical intervertebral disc    History of asbestos exposure    Mixed hyperlipidemia    Overweight    Mild  intermittent asthma without complication    Type 2 diabetes mellitus with diabetic autonomic neuropathy, without long-term current use of insulin    Screening PSA (prostate specific antigen)    Arthritis, multiple joint involvement    Family history of polyps in the colon    Myalgia    Encounter for general adult medical examination with abnormal findings    Erectile dysfunction    Pulmonary nodule    Lumbar disc disease    Paresthesia    Medicare annual wellness visit, initial    Depression screen    Immunization counseling    Colon cancer screening    Advance care planning    Coronary artery calcification seen on CAT scan    Adenomatous colon polyp       Current Outpatient Medications on File Prior to Visit   Medication Sig Dispense Refill    albuterol sulfate  (90 Base) MCG/ACT inhaler Inhale 2 puffs Every 4 (Four) Hours As Needed for Wheezing.      aspirin 81 MG EC tablet Take 1 tablet by mouth Daily.      atorvastatin (LIPITOR) 20 MG tablet Take 1 tablet by mouth Every Evening. 90 tablet 1    glucose blood test strip Blood sugar 1 x daily      ibuprofen (ADVIL,MOTRIN) 800 MG tablet Take 1 tablet by mouth Every 8 (Eight) Hours As Needed for Mild Pain.      metFORMIN (GLUCOPHAGE) 1000 MG tablet Take 1 tablet by mouth 2 (Two) Times a Day. 180 tablet 1    tadalafil (Cialis) 5 MG tablet Take 1 tablet by mouth Daily As Needed for Erectile Dysfunction. Take 4 pills on 1st day then 1 daily       No current facility-administered medications on file prior to visit.       No Known Allergies    Review of Systems   Constitutional:  Negative for unexpected weight gain and unexpected weight loss.   Eyes:  Negative for visual disturbance.   Gastrointestinal:  Negative for nausea.   Endocrine: Negative for polyphagia and polyuria.   Genitourinary:  Negative for frequency.   Musculoskeletal:  Negative for myalgias.   Skin:  Negative for dry skin and skin lesions.   Neurological:  Negative for syncope and numbness.  "      Objective   Visit Vitals  /76 (BP Location: Left arm, Patient Position: Sitting, Cuff Size: Adult)   Pulse 64   Temp 97.1 °F (36.2 °C)   Resp 14   Ht 180.3 cm (71\")   Wt 95.9 kg (211 lb 6.4 oz)   SpO2 97%   BMI 29.48 kg/m²     Physical Exam  Vitals and nursing note reviewed.   Constitutional:       Appearance: He is well-developed.   HENT:      Head: Normocephalic.   Neck:      Thyroid: No thyromegaly.      Vascular: No carotid bruit.      Trachea: Trachea normal.   Cardiovascular:      Rate and Rhythm: Normal rate and regular rhythm.      Heart sounds: No murmur heard.     No friction rub. No gallop.   Pulmonary:      Effort: Pulmonary effort is normal. No respiratory distress.      Breath sounds: Normal breath sounds. No wheezing.   Chest:      Chest wall: No tenderness.   Musculoskeletal:      Cervical back: Neck supple.   Skin:     General: Skin is dry.      Findings: No rash.      Nails: There is no clubbing.   Neurological:      Mental Status: He is alert and oriented to person, place, and time.   Psychiatric:         Behavior: Behavior is cooperative.           Assessment & Plan .  Problem List Items Addressed This Visit          Medium    Mixed hyperlipidemia - Primary    Current Assessment & Plan     Improved and at goal  Encouraged to watch fatty intake, exercise more, and lose weight.   compliant with medication Patient tolerated lipitor well without side effects. I feel the benefits of the medication outweigh the risks.    Is getting adequate diet and exercise  Goals developed at last visit were met   Follow up in  4 months  Care management needs are self-addressed. Self-management abilities addressed and patient is capable of managing his own disease.           Type 2 diabetes mellitus with diabetic autonomic neuropathy, without long-term current use of insulin    Overview     Last eye exam 1-2023 with Dr. Castellanos         Current Assessment & Plan     Stable- Hgb A1c 6.1  Encouraged to watch " sugar intake, exercise more and lose weight.   compliant with medication. Patient tolerated metformin well without side effects. I feel the benefits of the medication outweigh the risks.    Not monitoring sugar at home.   Follow up in 4 months  Care management needs are self-addressed.  Self-management abilities addressed and patient is capable of managing his own disease.              Unprioritized    Overweight    Current Assessment & Plan     Patient's (Body mass index is 29.48 kg/m².) indicates that they are overweight with health conditions that include diabetes mellitus and dyslipidemias . Weight is improving with lifestyle modifications. BMI is is above average; BMI management plan is completed. We discussed low calorie, low carb based diet program, portion control, and increasing exercise.

## 2024-06-17 NOTE — ASSESSMENT & PLAN NOTE
Patient's (Body mass index is 29.48 kg/m².) indicates that they are overweight with health conditions that include diabetes mellitus and dyslipidemias . Weight is improving with lifestyle modifications. BMI is is above average; BMI management plan is completed. We discussed low calorie, low carb based diet program, portion control, and increasing exercise.

## 2024-06-17 NOTE — ASSESSMENT & PLAN NOTE
Improved and at goal  Encouraged to watch fatty intake, exercise more, and lose weight.   compliant with medication Patient tolerated lipitor well without side effects. I feel the benefits of the medication outweigh the risks.    Is getting adequate diet and exercise  Goals developed at last visit were met   Follow up in  4 months  Care management needs are self-addressed. Self-management abilities addressed and patient is capable of managing his own disease.

## 2024-06-17 NOTE — ASSESSMENT & PLAN NOTE
Stable- Hgb A1c 6.1  Encouraged to watch sugar intake, exercise more and lose weight.   compliant with medication. Patient tolerated metformin well without side effects. I feel the benefits of the medication outweigh the risks.    Not monitoring sugar at home.   Follow up in 4 months  Care management needs are self-addressed.  Self-management abilities addressed and patient is capable of managing his own disease.

## 2024-06-18 PROBLEM — J06.9 URI (UPPER RESPIRATORY INFECTION): Status: RESOLVED | Noted: 2024-03-05 | Resolved: 2024-06-18

## 2024-06-18 NOTE — PROGRESS NOTES
Medical Examination    Subjective   Jeffrey Aviles JR is a 68 y.o. male who presents today for a  fitness determination physical exam. The patient reports no problems.  The following portions of the patient's history were reviewed and updated as appropriate: allergies, current medications, past family history, past medical history, past social history, past surgical history, and problem list.  Review of Systems  Pertinent items are noted in HPI.    Objective    Vision:  Vision Screening    Right eye Left eye Both eyes   Without correction      With correction 20/15 20/15 20/20       Applicant can recognize and distinguish among traffic control signals and devices showing standard red, green, and miryam colors.  Applicant has peripheral vision to 90 degrees in each eye.    Applicant meets visual acuity requirement only when wearing corrective lenses.    Monocular Vision?: No      Hearing:  Applicant can distinguish forced whisper at a distance of 5 feet with both ears.         Physical Exam  Constitutional:       Appearance: He is well-developed.   HENT:      Head: Normocephalic and atraumatic.      Right Ear: External ear normal.      Left Ear: External ear normal.      Nose: Nose normal.   Eyes:      Pupils: Pupils are equal, round, and reactive to light.   Cardiovascular:      Rate and Rhythm: Normal rate and regular rhythm.      Heart sounds: Normal heart sounds.   Pulmonary:      Effort: Pulmonary effort is normal.      Breath sounds: Normal breath sounds.   Abdominal:      General: Bowel sounds are normal.      Palpations: Abdomen is soft.   Musculoskeletal:         General: Normal range of motion.      Cervical back: Normal range of motion and neck supple.   Skin:     General: Skin is warm and dry.   Neurological:      Mental Status: He is alert and oriented to person, place, and time.   Psychiatric:         Behavior: Behavior normal.         Thought Content: Thought content  normal.         Judgment: Judgment normal.          Labs:  Lab Results   Component Value Date    SPECGRAV 1.025 06/19/2024    BILIRUBINUR Negative 06/19/2024    GLUCOSEU Negative 10/19/2023       Assessment & Plan   Healthy male exam.   Meets standards in 49 .41;  qualifies for 2 year certificate.     Medical examiners certificate completed and printed.  Return as needed.

## 2024-06-19 ENCOUNTER — CLINICAL SUPPORT (OUTPATIENT)
Dept: FAMILY MEDICINE CLINIC | Facility: CLINIC | Age: 69
End: 2024-06-19

## 2024-06-19 VITALS
RESPIRATION RATE: 20 BRPM | HEART RATE: 70 BPM | OXYGEN SATURATION: 99 % | WEIGHT: 209 LBS | DIASTOLIC BLOOD PRESSURE: 74 MMHG | BODY MASS INDEX: 29.26 KG/M2 | SYSTOLIC BLOOD PRESSURE: 136 MMHG | HEIGHT: 71 IN

## 2024-06-19 DIAGNOSIS — Z02.4 ENCOUNTER FOR CDL (COMMERCIAL DRIVING LICENSE) EXAM: Primary | ICD-10-CM

## 2024-06-19 LAB
BILIRUB BLD-MCNC: NEGATIVE MG/DL
CLARITY, POC: CLEAR
COLOR UR: YELLOW
GLUCOSE UR STRIP-MCNC: NEGATIVE MG/DL
KETONES UR QL: NEGATIVE
LEUKOCYTE EST, POC: NEGATIVE
NITRITE UR-MCNC: NEGATIVE MG/ML
PH UR: 6 [PH] (ref 5–8)
PROT UR STRIP-MCNC: NEGATIVE MG/DL
RBC # UR STRIP: ABNORMAL /UL
SP GR UR: 1.02 (ref 1–1.03)
UROBILINOGEN UR QL: ABNORMAL

## 2024-06-19 PROCEDURE — DOTPHY: Performed by: FAMILY MEDICINE

## 2024-06-19 PROCEDURE — 81002 URINALYSIS NONAUTO W/O SCOPE: CPT | Performed by: FAMILY MEDICINE

## 2024-10-31 ENCOUNTER — OFFICE VISIT (OUTPATIENT)
Dept: FAMILY MEDICINE CLINIC | Facility: CLINIC | Age: 69
End: 2024-10-31
Payer: MEDICARE

## 2024-10-31 ENCOUNTER — HOSPITAL ENCOUNTER (OUTPATIENT)
Dept: GENERAL RADIOLOGY | Facility: HOSPITAL | Age: 69
Discharge: HOME | End: 2024-10-31
Payer: MEDICARE

## 2024-10-31 VITALS
OXYGEN SATURATION: 98 % | HEIGHT: 70 IN | DIASTOLIC BLOOD PRESSURE: 78 MMHG | RESPIRATION RATE: 18 BRPM | HEART RATE: 62 BPM | WEIGHT: 214.5 LBS | BODY MASS INDEX: 30.71 KG/M2 | SYSTOLIC BLOOD PRESSURE: 126 MMHG | TEMPERATURE: 96.9 F

## 2024-10-31 DIAGNOSIS — Z00.00 MEDICARE ANNUAL WELLNESS VISIT, SUBSEQUENT: Primary | ICD-10-CM

## 2024-10-31 DIAGNOSIS — Z71.89 ADVANCE CARE PLANNING: ICD-10-CM

## 2024-10-31 DIAGNOSIS — Z12.5 SCREENING PSA (PROSTATE SPECIFIC ANTIGEN): ICD-10-CM

## 2024-10-31 DIAGNOSIS — R53.83 LETHARGY: ICD-10-CM

## 2024-10-31 DIAGNOSIS — Z71.85 IMMUNIZATION COUNSELING: ICD-10-CM

## 2024-10-31 DIAGNOSIS — M12.9 ARTHRITIS, MULTIPLE JOINT INVOLVEMENT: ICD-10-CM

## 2024-10-31 DIAGNOSIS — G89.29 CHRONIC PAIN OF RIGHT KNEE: ICD-10-CM

## 2024-10-31 DIAGNOSIS — E78.2 MIXED HYPERLIPIDEMIA: ICD-10-CM

## 2024-10-31 DIAGNOSIS — M51.9 LUMBAR DISC DISEASE: Primary | ICD-10-CM

## 2024-10-31 DIAGNOSIS — E11.43 TYPE 2 DIABETES MELLITUS WITH DIABETIC AUTONOMIC NEUROPATHY, WITHOUT LONG-TERM CURRENT USE OF INSULIN: ICD-10-CM

## 2024-10-31 DIAGNOSIS — Z13.31 DEPRESSION SCREEN: ICD-10-CM

## 2024-10-31 DIAGNOSIS — R00.1 BRADYCARDIA: ICD-10-CM

## 2024-10-31 DIAGNOSIS — R35.1 NOCTURIA: ICD-10-CM

## 2024-10-31 DIAGNOSIS — I44.0 1ST DEGREE AV BLOCK: ICD-10-CM

## 2024-10-31 DIAGNOSIS — J45.20 MILD INTERMITTENT ASTHMA WITHOUT COMPLICATION: ICD-10-CM

## 2024-10-31 DIAGNOSIS — M79.10 MYALGIA: ICD-10-CM

## 2024-10-31 DIAGNOSIS — Z23 NEED FOR PNEUMOCOCCAL VACCINATION: ICD-10-CM

## 2024-10-31 DIAGNOSIS — Z80.42 FAMILY HISTORY OF PROSTATE CANCER: ICD-10-CM

## 2024-10-31 DIAGNOSIS — M25.561 CHRONIC PAIN OF RIGHT KNEE: ICD-10-CM

## 2024-10-31 PROCEDURE — 3044F HG A1C LEVEL LT 7.0%: CPT | Performed by: FAMILY MEDICINE

## 2024-10-31 PROCEDURE — 90677 PCV20 VACCINE IM: CPT | Performed by: FAMILY MEDICINE

## 2024-10-31 PROCEDURE — 1159F MED LIST DOCD IN RCRD: CPT | Performed by: FAMILY MEDICINE

## 2024-10-31 PROCEDURE — 73564 X-RAY EXAM KNEE 4 OR MORE: CPT

## 2024-10-31 PROCEDURE — G0009 ADMIN PNEUMOCOCCAL VACCINE: HCPCS | Performed by: FAMILY MEDICINE

## 2024-10-31 PROCEDURE — 1160F RVW MEDS BY RX/DR IN RCRD: CPT | Performed by: FAMILY MEDICINE

## 2024-10-31 PROCEDURE — 99497 ADVNCD CARE PLAN 30 MIN: CPT | Performed by: FAMILY MEDICINE

## 2024-10-31 PROCEDURE — 1126F AMNT PAIN NOTED NONE PRSNT: CPT | Performed by: FAMILY MEDICINE

## 2024-10-31 PROCEDURE — 72110 X-RAY EXAM L-2 SPINE 4/>VWS: CPT

## 2024-10-31 PROCEDURE — G0439 PPPS, SUBSEQ VISIT: HCPCS | Performed by: FAMILY MEDICINE

## 2024-10-31 NOTE — ASSESSMENT & PLAN NOTE
Will order labs today and patient will return for results and shared decision making. Patient's wife feels myalgias are caused from medications, thus patient will do a trial off of Metformin and Lipitor

## 2024-10-31 NOTE — ASSESSMENT & PLAN NOTE
New dx.  Patient agrees to Xray and order given to patient and patient will return for results and shared decision making.  Advised quadricept exercises.

## 2024-10-31 NOTE — PROGRESS NOTES
Subjective   Jeffrey Aviles JR is a 69 y.o. male.   Chief Complaint   Patient presents with    Back Pain    Knee Pain    Asthma    Heart Problem     I have identified multiple medical problems which are significant and separately identifiable that require added work above and beyond the wellness visit. These are not trivial or insignificant and are billed with a 25-modifier    History of Present Illness  Follow up family history of Prostate cancer.  Back Pain  This is a chronic problem. The current episode started more than 1 year ago. The problem occurs intermittently. The problem has been worse since onset. The pain is present in the lumbar spine. Pertinent negatives include no chest pain.   Knee Pain   There was no injury mechanism. The pain is present in the right knee.   Asthma  There is no shortness of breath. This is a chronic problem. The current episode started more than 1 year ago. The problem occurs intermittently. The problem has been unchanged. Pertinent negatives include no chest pain. His past medical history is significant for asthma.   Heart Problem  This is a chronic (Bradycardia and 1st degree AV block) problem. The current episode started more than 1 year ago. The problem has been unchanged. Associated symptoms include arthralgias (rt. knee) and fatigue (mild). Pertinent negatives include no chest pain or joint swelling.                  Review of Systems   Constitutional:  Positive for fatigue (mild).   HENT:  Negative for hearing loss.    Respiratory:  Negative for shortness of breath.    Cardiovascular:  Negative for chest pain and palpitations.   Genitourinary:  Positive for nocturia (1 x nightly). Negative for frequency.        Nocturia   Musculoskeletal:  Positive for arthralgias (rt. knee) and back pain. Negative for joint swelling.   Neurological:  Negative for memory problem.       Objective     Physical Exam  Vitals and nursing note reviewed.   Constitutional:       Appearance: He is  well-developed.   HENT:      Head: Normocephalic.   Neck:      Thyroid: No thyromegaly.      Vascular: No carotid bruit.      Trachea: Trachea normal.   Cardiovascular:      Rate and Rhythm: Normal rate and regular rhythm.      Heart sounds: No murmur heard.     No friction rub. No gallop.   Pulmonary:      Effort: Pulmonary effort is normal. No respiratory distress.      Breath sounds: Normal breath sounds. No wheezing.   Chest:      Chest wall: No tenderness.   Musculoskeletal:      Cervical back: Neck supple.      Left knee: Tenderness present.   Skin:     General: Skin is dry.      Findings: No rash.      Nails: There is no clubbing.   Neurological:      Mental Status: He is alert and oriented to person, place, and time.   Psychiatric:         Behavior: Behavior is cooperative.         Assessment & Plan   Problem List Items Addressed This Visit          Medium    Mixed hyperlipidemia    Current Assessment & Plan      Will order labs today and patient will return for results and shared decision making.           Relevant Orders    Lipid Panel With / Chol / HDL Ratio (Completed)    Comprehensive Metabolic Panel (Completed)    Type 2 diabetes mellitus with diabetic autonomic neuropathy, without long-term current use of insulin    Overview     Last eye exam 1-2023 with Dr. Castellanos         Current Assessment & Plan     Will order labs today and patient will return for results and shared decision making.           Relevant Orders    Hemoglobin A1c (Completed)    Microalbumin / Creatinine Urine Ratio - Urine, Clean Catch (Completed)       Low    Mild intermittent asthma without complication    Overview     Followed with allergist 2 x yearly who does PFT's         Current Assessment & Plan     Doing well.  Patient tolerated Albuterol well without side effects. I feel the benefits of the medication outweigh the risks.   Advised to avoid dust and smoke exposure                  Unprioritized    1st degree AV block     Current Assessment & Plan     New dx.  12 lead EKG done today, read by me, reviewed with pt.  EKG showed sinus bradycardia with vent rate of 50 and a new 1st degree AV block from 2019.  Asymptomatic repeat next year         Arthritis, multiple joint involvement    Current Assessment & Plan     Will order labs today and patient will return for results and shared decision making.           Relevant Orders    C-reactive protein (Completed)    Bradycardia    Overview     Found on EKG 2019         Current Assessment & Plan     12 lead EKG done today, read by me, reviewed with pt.  EKG showed sinus bradycardia with vent rate of 50 and a new 1st degree AV block from 2019.  Patient to follow pulses         Relevant Orders    ECG 12 Lead    Chronic pain of right knee    Current Assessment & Plan     New dx.  Patient agrees to Xray and order given to patient and patient will return for results and shared decision making.  Advised quadricept exercises.           Relevant Orders    XR Knee 4+ View Right    Family history of prostate cancer    Overview     Dad dx at age 78,  at 84  Brother dx in  at age 56-living         Current Assessment & Plan     Will order labs today and patient will return for results and shared decision making.           Lethargy    Current Assessment & Plan     Will order labs today and patient will return for results and shared decision making.           Relevant Orders    CBC & Differential (Completed)    TSH (Completed)    Lumbar disc disease - Primary    Overview     Found on x-ray done 2022 and reviewed with patient         Current Assessment & Plan     Worse, discussed xray, pt. Agrees and order given to him and patient will return for results and shared decision making.           Relevant Orders    XR Spine Lumbar Complete 4+VW (Completed)    Myalgia    Current Assessment & Plan     Will order labs today and patient will return for results and shared  decision making. Patient's wife feels myalgias are caused from medications, thus patient will do a trial off of Metformin and Lipitor           Relevant Orders    CK (Completed)    Nocturia    Current Assessment & Plan     Will order labs today and patient will return for results and shared decision making.           Relevant Orders    Urinalysis without microscopic (no culture) - Urine, Clean Catch (Completed)    Screening PSA (prostate specific antigen)    Current Assessment & Plan     Will order labs today and patient will return for results and shared decision making.           Relevant Orders    PSA Screen (Completed)

## 2024-10-31 NOTE — ASSESSMENT & PLAN NOTE
Worse, discussed xray, pt. Agrees and order given to him and patient will return for results and shared decision making.

## 2024-10-31 NOTE — ASSESSMENT & PLAN NOTE
Pneu 20 given today.  Due for COVID Shingrix flu  but he declines them all at present and will check on coverage of Shingrix at drug store

## 2024-10-31 NOTE — ASSESSMENT & PLAN NOTE
12 lead EKG done today, read by me, reviewed with pt.  EKG showed sinus bradycardia with vent rate of 50 and a new 1st degree AV block from 11-.  Patient to follow pulses

## 2024-10-31 NOTE — PROGRESS NOTES
The ABCs of the Annual Wellness Visit  Subsequent Medicare Wellness Visit        Subjective    History of Present Illness:  Jeffrey Aviles JR is a 69 y.o. male who presents for a Subsequent Medicare Wellness Visit.    The following portions of the patient's history were reviewed and   updated as appropriate: allergies, current medications, past family history, past medical history, past social history, past surgical history, and problem list.      Recent Hospitalizations:  He was not admitted to the hospital during the last year.       Current Medical Providers:  Patient Care Team:  Zhou Keen MD as PCP - General  Deanne Castellanos (Optometry)  Asthma and Allergy (Pulmonary Disease)    [facility].  Outpatient Medications Prior to Visit   Medication Sig Dispense Refill    albuterol sulfate  (90 Base) MCG/ACT inhaler Inhale 2 puffs Every 4 (Four) Hours As Needed for Wheezing.      atorvastatin (LIPITOR) 20 MG tablet Take 1 tablet by mouth Every Evening. 90 tablet 1    glucose blood test strip Blood sugar 1 x daily      ibuprofen (ADVIL,MOTRIN) 800 MG tablet Take 1 tablet by mouth Every 8 (Eight) Hours As Needed for Mild Pain.      metFORMIN (GLUCOPHAGE) 1000 MG tablet Take 1 tablet by mouth 2 (Two) Times a Day. 180 tablet 1    tadalafil (Cialis) 5 MG tablet Take 1 tablet by mouth Daily As Needed for Erectile Dysfunction. Take 4 pills on 1st day then 1 daily      aspirin 81 MG EC tablet Take 1 tablet by mouth Daily. (Patient not taking: Reported on 10/31/2024)       No facility-administered medications prior to visit.       No opioid medication identified on active medication list. I have reviewed chart for other potential  high risk medication/s and harmful drug interactions in the elderly.        Aspirin is on active medication list. Aspirin use is indicated based on review of current medical condition/s. Pros and cons of this therapy have been discussed today. Benefits of this medication outweigh potential  "harm.  Patient has been encouraged to continue taking this medication.  .      Patient Active Problem List   Diagnosis    Bilateral hearing loss    Chronic allergic rhinitis due to pollen    Displacement of cervical intervertebral disc    History of asbestos exposure    Mixed hyperlipidemia    Overweight    Mild intermittent asthma without complication    Type 2 diabetes mellitus with diabetic autonomic neuropathy, without long-term current use of insulin    Screening PSA (prostate specific antigen)    Arthritis, multiple joint involvement    Family history of polyps in the colon    Myalgia    Encounter for general adult medical examination with abnormal findings    Erectile dysfunction    Pulmonary nodule    Lumbar disc disease    Paresthesia    Medicare annual wellness visit, initial    Depression screen    Immunization counseling    Colon cancer screening    Advance care planning    Coronary artery calcification seen on CAT scan    Adenomatous colon polyp    Medicare annual wellness visit, subsequent    Chronic pain of right knee    Family history of prostate cancer    Lethargy    Nocturia    Bradycardia    1st degree AV block            Above medical problems all reviewed and significant problems identified and reviewed in the E and M visit.   Objective          Vitals:    10/31/24 0918   BP: 126/78   BP Location: Left arm   Patient Position: Sitting   Cuff Size: Adult   Pulse: 62   Resp: 18   Temp: 96.9 °F (36.1 °C)   TempSrc: Temporal   SpO2: 98%   Weight: 97.3 kg (214 lb 8 oz)   Height: 177.8 cm (70\")   PainSc: 0-No pain     Estimated body mass index is 30.78 kg/m² as calculated from the following:    Height as of this encounter: 177.8 cm (70\").    Weight as of this encounter: 97.3 kg (214 lb 8 oz).           Does the patient have evidence of cognitive impairment? No           Family History   Problem Relation Age of Onset    Cerebral aneurysm Mother          at 42    Hypertension Mother     " Hyperlipidemia Father     Hypertension Father     Other Father         spinal stenosis    Asthma Father         living at 86    Prostate cancer Father     Cancer Father         Prostate cancer  at 87    Breast cancer Sister     Hyperlipidemia Sister     Cancer Brother 63        Prostate    Hypertension Brother     Hyperlipidemia Brother     Other Brother 30        Hepatitis    Diabetes Maternal Uncle     Heart disease Maternal Uncle     Lung cancer Maternal Uncle     Stroke Maternal Grandmother     Prostate cancer Maternal Grandfather        Compared to one year ago, the patient feels his physical   health is the same.    Compared to one year ago, the patient feels his mental   health is the same.    HEALTH RISK ASSESSMENT    Smoking Status:  Social History     Tobacco Use   Smoking Status Never    Passive exposure: Never   Smokeless Tobacco Never     Alcohol Consumption:  Social History     Substance and Sexual Activity   Alcohol Use No     Fall Risk Screen:    STEADI Fall Risk Assessment was completed, and patient is at LOW risk for falls.Assessment completed on:10/31/2024    Depression Screening:  Depression screen reviewed and discussed with patient. Recommendations were not needed. Medications were not discussed, counseling was not discussed. We spent 2 minutes with the screen and discussion of depression and options.         10/31/2024     9:28 AM   PHQ-2/PHQ-9 Depression Screening   Little interest or pleasure in doing things Not at all   Feeling down, depressed, or hopeless Not at all       Health Habits and Functional and Cognitive Screening:      10/31/2024     9:25 AM   Functional & Cognitive Status   Do you have difficulty preparing food and eating? No   Do you have difficulty bathing yourself, getting dressed or grooming yourself? No   Do you have difficulty using the toilet? No   Do you have difficulty moving around from place to place? No   Do you have trouble with steps or getting out of a  bed or a chair? No   Current Diet Well Balanced Diet   Dental Exam Up to date   Eye Exam Up to date   Exercise (times per week) 0 times per week   Current Exercises Include No Regular Exercise   Do you need help using the phone?  No   Are you deaf or do you have serious difficulty hearing?  No   Do you need help to go to places out of walking distance? No   Do you need help shopping? No   Do you need help preparing meals?  No   Do you need help with housework?  No   Do you need help with laundry? No   Do you need help taking your medications? No   Do you need help managing money? No   Do you ever drive or ride in a car without wearing a seat belt? Yes   Have you felt unusual stress, anger or loneliness in the last month? No   Who do you live with? Spouse   If you need help, do you have trouble finding someone available to you? No   Have you been bothered in the last four weeks by sexual problems? No   Do you have difficulty concentrating, remembering or making decisions? No   Patient only uses seatbelts 30% of the time, patient advised to use seatbelts 100% of the time.    Age-appropriate Screening Schedule:  Refer to the list below for future screening recommendations based on patient's age, sex and/or medical conditions. Orders for these recommended tests are listed in the plan section. The patient has been provided with a written plan.    Health Maintenance   Topic Date Due    ZOSTER VACCINE (1 of 2) Never done    HEPATITIS C SCREENING  Never done    DIABETIC FOOT EXAM  11/20/2020    INFLUENZA VACCINE  08/01/2024    COVID-19 Vaccine (1 - 2024-25 season) Never done    ANNUAL WELLNESS VISIT  10/19/2024    DIABETIC EYE EXAM  12/21/2024    HEMOGLOBIN A1C  04/30/2025    BMI FOLLOWUP  06/17/2025    LIPID PANEL  10/31/2025    URINE MICROALBUMIN  10/31/2025    TDAP/TD VACCINES (2 - Td or Tdap) 05/03/2026    COLORECTAL CANCER SCREENING  01/30/2034    Pneumococcal Vaccine 65+  Completed       Immunizations:  Jeffrey Aviles  JR is due for Shingrix, Pneumovax, and Influenza  Pneu 20 given 10-31-24, pt. Will check on coverage of Shingrix with drug store and declines flu shot.  Colorectal Screening  Jeffrey Aviles JR last colonoscopy was 1-30-24 with Dr. Magdaleno advised repeat colonoscopy 1-2031  Last Completed Colonoscopy            COLORECTAL CANCER SCREENING (COLONOSCOPY - Every 10 Years) Next due on 1/30/2034 01/30/2024  SCANNED - COLONOSCOPY    11/06/2023  Cologuard component of Cologuard - Stool, Per Rectum    06/06/2016  SCANNED - COLONOSCOPY    06/02/2016  Outside Procedure: CT COLONOSCOPY FLX DX W/COLLJ SPEC WHEN PFRMD    11/17/2011  Outside Procedure: CT COLONOSCOPY,DIAGNOSTIC    Only the first 5 history entries have been loaded, but more history exists.                       Assessment & Plan   CMS Preventative Services Quick Reference    Risk Factors Identified During Encounter      Immunizations Discussed/Encouraged: Tdap, Influenza, Prevnar 20 (Pneumococcal 20-valent conjugate), Shingrix, and COVID19  The above risks/problems have been discussed with the patient.  Follow up actions/plans if indicated are seen below in the Assessment/Plan Section.  Pertinent information has been shared with the patient in the After Visit Summary.    I have identified multiple medical problems which are significant and separately identifiable that require added work above and beyond the wellness visit. These are not trivial or insignificant and are billed with a 25-modifier  These are in a separate E/M note      Sit-to-Stand Exercise    The sit-to-stand exercise (also known as the chair stand or chair rise exercise) strengthens your lower body and helps you maintain or improve your mobility and independence. The goal is to do the sit-to-stand exercise without using your hands. This will be easier as you become stronger. You should always talk with your health care provider before starting any exercise program, especially if you have had  recent surgery.  Do the exercise exactly as told by your health care provider and adjust it as directed. It is normal to feel mild stretching, pulling, tightness, or discomfort as you do this exercise, but you should stop right away if you feel sudden pain or your pain gets worse. Do not begin doing this exercise until told by your health care provider.  What the sit-to-stand exercise does  The sit-to-stand exercise helps to strengthen the muscles in your thighs and the muscles in the center of your body that give you stability (core muscles). This exercise is especially helpful if:  You have had knee or hip surgery.  You have trouble getting up from a chair, out of a car, or off the toilet.  How to do the sit-to-stand exercise  Sit toward the front edge of a sturdy chair without armrests. Your knees should be bent and your feet should be flat on the floor and shoulder-width apart.  Place your hands lightly on each side of the seat. Keep your back and neck as straight as possible, with your chest slightly forward.  Breathe in slowly. Lean forward and slightly shift your weight to the front of your feet.  Breathe out as you slowly stand up. Use your hands as little as possible.  Stand and pause for a full breath in and out.  Breathe in as you sit down slowly. Tighten your core and abdominal muscles to control your lowering as much as possible.  Breathe out slowly.  Do this exercise 10-15 times. If needed, do it fewer times until you build up strength.  Rest for 1 minute, then do another set of 10-15 repetitions.  To change the difficulty of the sit-to-stand exercise  If the exercise is too difficult, use a chair with sturdy armrests, and push off the armrests to help you come to the standing position. You can also use the armrests to help slowly lower yourself back to sitting. As this gets easier, try to use your arms less. You can also place a firm cushion or pillow on the chair to make the surface higher.  If this  exercise is too easy, do not use your arms to help raise or lower yourself. You can also wear a weighted vest, use hand weights, increase your repetitions, or try a lower chair.  General tips  You may feel tired when starting an exercise routine. This is normal.  You may have muscle soreness that lasts a few days. This is normal. As you get stronger, you may not feel muscle soreness.  Use smooth, steady movements.  Do not  hold your breath during strength exercises. This can cause unsafe changes in your blood pressure.  Breathe in slowly through your nose, and breathe out slowly through your mouth.  Summary  Strengthening your lower body is an important step to help you move safely and independently.  The sit-to-stand exercise helps strengthen the muscles in your thighs and core.  You should always talk with your health care provider before starting any exercise program, especially if you have had recent surgery.  This information is not intended to replace advice given to you by your health care provider. Make sure you discuss any questions you have with your health care provider.  Document Revised: 10/16/2019 Document Reviewed: 02/08/2018  Elsevier Patient Education © 2021 [employer]      Fall Prevention in the Home, Adult  Falls can cause injuries and can happen to people of all ages. There are many things you can do to make your home safe and to help prevent falls. Ask for help when making these changes.  What actions can I take to prevent falls?  General Instructions  Use good lighting in all rooms. Replace any light bulbs that burn out.  Turn on the lights in dark areas. Use night-lights.  Keep items that you use often in easy-to-reach places. Lower the shelves around your home if needed.  Set up your furniture so you have a clear path. Avoid moving your furniture around.  Do not have throw rugs or other things on the floor that can make you trip.  Avoid walking on wet floors.  If any of your floors are  uneven, fix them.  Add color or contrast paint or tape to clearly nikos and help you see:  Grab bars or handrails.  First and last steps of staircases.  Where the edge of each step is.  If you use a stepladder:  Make sure that it is fully opened. Do not climb a closed stepladder.  Make sure the sides of the stepladder are locked in place.  Ask someone to hold the stepladder while you use it.  Know where your pets are when moving through your home.  What can I do in the bathroom?         Keep the floor dry. Clean up any water on the floor right away.  Remove soap buildup in the tub or shower.  Use nonskid mats or decals on the floor of the tub or shower.  Attach bath mats securely with double-sided, nonslip rug tape.  If you need to sit down in the shower, use a plastic, nonslip stool.  Install grab bars by the toilet and in the tub and shower. Do not use towel bars as grab bars.  What can I do in the bedroom?  Make sure that you have a light by your bed that is easy to reach.  Do not use any sheets or blankets for your bed that hang to the floor.  Have a firm chair with side arms that you can use for support when you get dressed.  What can I do in the kitchen?  Clean up any spills right away.  If you need to reach something above you, use a step stool with a grab bar.  Keep electrical cords out of the way.  Do not use floor polish or wax that makes floors slippery.  What can I do with my stairs?  Do not leave any items on the stairs.  Make sure that you have a light switch at the top and the bottom of the stairs.  Make sure that there are handrails on both sides of the stairs. Fix handrails that are broken or loose.  Install nonslip stair treads on all your stairs.  Avoid having throw rugs at the top or bottom of the stairs.  Choose a carpet that does not hide the edge of the steps on the stairs.  Check carpeting to make sure that it is firmly attached to the stairs. Fix carpet that is loose or worn.  What can I do  on the outside of my home?  Use bright outdoor lighting.  Fix the edges of walkways and driveways and fix any cracks.  Remove anything that might make you trip as you walk through a door, such as a raised step or threshold.  Trim any bushes or trees on paths to your home.  Check to see if handrails are loose or broken and that both sides of all steps have handrails.  Install guardrails along the edges of any raised decks and porches.  Clear paths of anything that can make you trip, such as tools or rocks.  Have leaves, snow, or ice cleared regularly.  Use sand or salt on paths during winter.  Clean up any spills in your garage right away. This includes grease or oil spills.  What other actions can I take?  Wear shoes that:  Have a low heel. Do not wear high heels.  Have rubber bottoms.  Feel good on your feet and fit well.  Are closed at the toe. Do not wear open-toe sandals.  Use tools that help you move around if needed. These include:  Canes.  Walkers.  Scooters.  Crutches.  Review your medicines with your doctor. Some medicines can make you feel dizzy. This can increase your chance of falling.  Ask your doctor what else you can do to help prevent falls.  Where to find more information  Centers for Disease Control and Prevention, STEADI: www.cdc.gov  National [place] on Aging: www.shakira.nih.gov  Contact a doctor if:  You are afraid of falling at home.  You feel weak, drowsy, or dizzy at home.  You fall at home.  Summary  There are many simple things that you can do to make your home safe and to help prevent falls.  Ways to make your home safe include removing things that can make you trip and installing grab bars in the bathroom.  Ask for help when making these changes in your home.  This information is not intended to replace advice given to you by your health care provider. Make sure you discuss any questions you have with your health care provider.  Document Revised: 07/21/2021 Document Reviewed:  07/21/2021  [employer] Patient Education © 2021 [employer]            Advance Care Planning    Advance Directive is on file.  ACP discussion was held with the patient during this visit. Patient has an advance directive in EMR which is still valid.   Discussion with Patient regarding advanced directives. POST form discussed at length and reviewed with patient. POST reviewed and updated today. I spent 18 minutes  with patient reviewing information and documenting  in the chart.  Patient states he does want CPR. Reviewed medical interventions with patient and the differences between each: Comfort, Limited and Full. Patient opted for Full. Discussed the use of antibiotics at the end of life. He chose to use antibiotics consistent with treatment goals. Discussed artificially administered nutrition, patient is aware that if he is alert and oriented they can change their mind at any time. However, they have elected to have no artificial nutrition. Patient has identified his spouse Sachin Aviles as his healthcare representative. Advised to discuss with healthcare representative if they can comply with wishes. Patient encouraged to have a meeting to discuss his decision regarding advanced care directives and goals of care with extended family and significant  friends  In regard to the POST form:The patient opted to complete POST while in the office and copy scanned into the chart. and advised to give to family members, place in an easily accessible place and take with them if going to the hospital or Emergency room.      Follow Up:   Future Appointments         Provider Department Center    11/14/2024 11:00 AM Zhou Keen MD [facility]            An After Visit Summary and PPPS were made available to the patient.      Diagnoses and all orders for this visit:    1. Medicare annual wellness visit, subsequent (Primary)  Assessment & Plan:  --Completed 10-31-24      2. Immunization  counseling  Overview:  Pneu 20 given 10-31-24  T-Dap 5-3-2016  Due for COVID Shingrix flu and pt delines them but has agreed to check on coverage of Shingrix with drug store.    Assessment & Plan:  Pneu 20 given today.  Due for COVID Shingrix flu  but he declines them all at present and will check on coverage of Shingrix at drug store      3. Advance care planning  Overview:  Completed and scanned to the chart    Assessment & Plan:  Completed 10-31-24 and scanned to chart      4. Depression screen  Assessment & Plan:  Completed 10-31-24 and low risk      5. Need for pneumococcal vaccination  -     Pneumococcal Conjugate Vaccine 20-Valent (PCV20)

## 2024-10-31 NOTE — ASSESSMENT & PLAN NOTE
New dx.  12 lead EKG done today, read by me, reviewed with pt.  EKG showed sinus bradycardia with vent rate of 50 and a new 1st degree AV block from 11-.  Asymptomatic repeat next year

## 2024-10-31 NOTE — ASSESSMENT & PLAN NOTE
Doing well.  Patient tolerated Albuterol well without side effects. I feel the benefits of the medication outweigh the risks.   Advised to avoid dust and smoke exposure

## 2024-11-01 LAB
ALBUMIN SERPL-MCNC: 4.4 G/DL (ref 3.9–4.9)
ALBUMIN/CREAT UR: 3 MG/G CREAT (ref 0–29)
ALP SERPL-CCNC: 62 IU/L (ref 44–121)
ALT SERPL-CCNC: 15 IU/L (ref 0–44)
APPEARANCE UR: CLEAR
AST SERPL-CCNC: 13 IU/L (ref 0–40)
BASOPHILS # BLD AUTO: 0 X10E3/UL (ref 0–0.2)
BASOPHILS NFR BLD AUTO: 1 %
BILIRUB SERPL-MCNC: 0.7 MG/DL (ref 0–1.2)
BILIRUB UR QL STRIP: NEGATIVE
BUN SERPL-MCNC: 21 MG/DL (ref 8–27)
BUN/CREAT SERPL: 30 (ref 10–24)
CALCIUM SERPL-MCNC: 8.9 MG/DL (ref 8.6–10.2)
CHLORIDE SERPL-SCNC: 104 MMOL/L (ref 96–106)
CHOLEST SERPL-MCNC: 123 MG/DL (ref 100–199)
CHOLEST/HDLC SERPL: 3.2 RATIO (ref 0–5)
CK SERPL-CCNC: 46 U/L (ref 41–331)
CO2 SERPL-SCNC: 25 MMOL/L (ref 20–29)
COLOR UR: YELLOW
CREAT SERPL-MCNC: 0.71 MG/DL (ref 0.76–1.27)
CREAT UR-MCNC: 107.6 MG/DL
CRP SERPL-MCNC: <1 MG/L (ref 0–10)
EGFRCR SERPLBLD CKD-EPI 2021: 100 ML/MIN/1.73
EOSINOPHIL # BLD AUTO: 0.2 X10E3/UL (ref 0–0.4)
EOSINOPHIL NFR BLD AUTO: 2 %
ERYTHROCYTE [DISTWIDTH] IN BLOOD BY AUTOMATED COUNT: 12.4 % (ref 11.6–15.4)
GLOBULIN SER CALC-MCNC: 2.7 G/DL (ref 1.5–4.5)
GLUCOSE SERPL-MCNC: 107 MG/DL (ref 70–99)
GLUCOSE UR QL STRIP: NEGATIVE
HBA1C MFR BLD: 6.3 % (ref 4.8–5.6)
HCT VFR BLD AUTO: 46.6 % (ref 37.5–51)
HDLC SERPL-MCNC: 39 MG/DL
HGB BLD-MCNC: 14.5 G/DL (ref 13–17.7)
HGB UR QL STRIP: NEGATIVE
IMM GRANULOCYTES # BLD AUTO: 0 X10E3/UL (ref 0–0.1)
IMM GRANULOCYTES NFR BLD AUTO: 0 %
KETONES UR QL STRIP: NEGATIVE
LDLC SERPL CALC-MCNC: 65 MG/DL (ref 0–99)
LEUKOCYTE ESTERASE UR QL STRIP: NEGATIVE
LYMPHOCYTES # BLD AUTO: 3 X10E3/UL (ref 0.7–3.1)
LYMPHOCYTES NFR BLD AUTO: 39 %
MCH RBC QN AUTO: 29.4 PG (ref 26.6–33)
MCHC RBC AUTO-ENTMCNC: 31.1 G/DL (ref 31.5–35.7)
MCV RBC AUTO: 94 FL (ref 79–97)
MICROALBUMIN UR-MCNC: 3.4 UG/ML
MONOCYTES # BLD AUTO: 0.6 X10E3/UL (ref 0.1–0.9)
MONOCYTES NFR BLD AUTO: 8 %
NEUTROPHILS # BLD AUTO: 3.8 X10E3/UL (ref 1.4–7)
NEUTROPHILS NFR BLD AUTO: 50 %
NITRITE UR QL STRIP: NEGATIVE
PH UR STRIP: 5.5 [PH] (ref 5–7.5)
PLATELET # BLD AUTO: 242 X10E3/UL (ref 150–450)
POTASSIUM SERPL-SCNC: 4.2 MMOL/L (ref 3.5–5.2)
PROT SERPL-MCNC: 7.1 G/DL (ref 6–8.5)
PROT UR QL STRIP: NEGATIVE
PSA SERPL-MCNC: 0.7 NG/ML (ref 0–4)
RBC # BLD AUTO: 4.94 X10E6/UL (ref 4.14–5.8)
SODIUM SERPL-SCNC: 139 MMOL/L (ref 134–144)
SP GR UR STRIP: 1.03 (ref 1–1.03)
TRIGL SERPL-MCNC: 103 MG/DL (ref 0–149)
TSH SERPL DL<=0.005 MIU/L-ACNC: 1.66 UIU/ML (ref 0.45–4.5)
UROBILINOGEN UR STRIP-MCNC: 0.2 MG/DL (ref 0.2–1)
VLDLC SERPL CALC-MCNC: 19 MG/DL (ref 5–40)
WBC # BLD AUTO: 7.6 X10E3/UL (ref 3.4–10.8)

## 2024-11-13 NOTE — ASSESSMENT & PLAN NOTE
Moderate.  CRP done 10-31-24, read by me, reviewed with pt.  CRP was <1.  Patient tolerated IBU well without side effects. I feel the benefits of the medication outweigh the risks.

## 2024-11-13 NOTE — ASSESSMENT & PLAN NOTE
Stable.  12 lead EKG 10-31-24, read by me, reviewed with pt. EKG showed sinus bradycardia with vent rate of 50 and a new 1st degree AV block from 11-. Asymptomatic repeat next year

## 2024-11-13 NOTE — ASSESSMENT & PLAN NOTE
A1c and Microalbumin/creatinine ratio done 10-31-24, read by me, reviewed with pt. A1c was 6.3 up from 6.1 and Microalbumin/creatinine ratio was 3 down from 4  Worsening.  Discussed Jardiance and injectables, pt. Declines at present.  Encouraged to watch sugar intake, exercise more and lose weight.   Compliant with medication.  Patient tolerated Metformin well without side effects. I feel the benefits of the medication outweigh the risks.   Monitoring sugar at home.   Follow up in 4 months  Care management needs are self-addressed.  Self-management abilities addressed and patient is capable of managing his own disease.

## 2024-11-13 NOTE — ASSESSMENT & PLAN NOTE
Mild.  Xray done 10-31-24, results reviewed with pt.  Mild degenerative changes.  Advised quad exercises and try Voltaren gel, pt. Declines PT and sports medicine referral

## 2024-11-13 NOTE — ASSESSMENT & PLAN NOTE
New dx.  12 lead EKG 10-31-24, read by me, reviewed with pt. EKG showed sinus bradycardia with vent rate of 50 and a new 1st degree AV block from 11-. Asymptomatic repeat next year

## 2024-11-13 NOTE — ASSESSMENT & PLAN NOTE
Lipid and CMP done 10-31-24, read by me, reviewed with pt.  Trig. 103 up from 91, Tot. Chol. 123 up from 122, HDL 39 down from 41, LDL 65 up from 63  Slightly worse.  Advised restart Lipitor 20 mg daily due to no improvement off of Lipitor.    Advised CT calcium screening, pt. Agrees and order given to patient.  Encouraged to watch fatty intake, exercise more, and lose weight.   No medication.  Due to trial of being off Lipitor due to myalgias  Goals developed at last visit were not met close to goal  Follow up in 4 months  Care management needs are self-addressed. Self-management abilities addressed and patient is capable of managing his own disease.

## 2024-11-13 NOTE — ASSESSMENT & PLAN NOTE
Mild, normal for age thus delete.  U/A done 10-31-24, read by me, reviewed with pt.  U/A was normal

## 2024-11-13 NOTE — ASSESSMENT & PLAN NOTE
Mild, normal for age thus delete.  CBC and TSH done 10-31-24, read by me, reviewed with pt.  TSH was normal, CBC showed MCHC 31.1 down from 32.6

## 2024-11-14 ENCOUNTER — OFFICE VISIT (OUTPATIENT)
Dept: FAMILY MEDICINE CLINIC | Facility: CLINIC | Age: 69
End: 2024-11-14
Payer: MEDICARE

## 2024-11-14 VITALS
BODY MASS INDEX: 30.84 KG/M2 | RESPIRATION RATE: 18 BRPM | TEMPERATURE: 97.1 F | WEIGHT: 215.4 LBS | OXYGEN SATURATION: 97 % | SYSTOLIC BLOOD PRESSURE: 126 MMHG | HEIGHT: 70 IN | HEART RATE: 64 BPM | DIASTOLIC BLOOD PRESSURE: 66 MMHG

## 2024-11-14 DIAGNOSIS — J30.1 CHRONIC ALLERGIC RHINITIS DUE TO POLLEN: ICD-10-CM

## 2024-11-14 DIAGNOSIS — M12.9 ARTHRITIS, MULTIPLE JOINT INVOLVEMENT: ICD-10-CM

## 2024-11-14 DIAGNOSIS — M25.561 CHRONIC PAIN OF RIGHT KNEE: ICD-10-CM

## 2024-11-14 DIAGNOSIS — M50.20 DISPLACEMENT OF CERVICAL INTERVERTEBRAL DISC: ICD-10-CM

## 2024-11-14 DIAGNOSIS — I44.0 1ST DEGREE AV BLOCK: ICD-10-CM

## 2024-11-14 DIAGNOSIS — E11.43 TYPE 2 DIABETES MELLITUS WITH DIABETIC AUTONOMIC NEUROPATHY, WITHOUT LONG-TERM CURRENT USE OF INSULIN: Primary | ICD-10-CM

## 2024-11-14 DIAGNOSIS — Z83.719 FAMILY HISTORY OF POLYPS IN THE COLON: ICD-10-CM

## 2024-11-14 DIAGNOSIS — E66.3 OVERWEIGHT: ICD-10-CM

## 2024-11-14 DIAGNOSIS — Z71.85 IMMUNIZATION COUNSELING: ICD-10-CM

## 2024-11-14 DIAGNOSIS — H83.3X3 NOISE-INDUCED HEARING LOSS OF BOTH EARS: ICD-10-CM

## 2024-11-14 DIAGNOSIS — Z80.42 FAMILY HISTORY OF PROSTATE CANCER: ICD-10-CM

## 2024-11-14 DIAGNOSIS — E78.2 MIXED HYPERLIPIDEMIA: ICD-10-CM

## 2024-11-14 DIAGNOSIS — D12.6 ADENOMATOUS POLYP OF COLON, UNSPECIFIED PART OF COLON: ICD-10-CM

## 2024-11-14 DIAGNOSIS — R00.1 BRADYCARDIA: ICD-10-CM

## 2024-11-14 DIAGNOSIS — K59.00 CONSTIPATION, UNSPECIFIED CONSTIPATION TYPE: ICD-10-CM

## 2024-11-14 DIAGNOSIS — R91.1 PULMONARY NODULE: ICD-10-CM

## 2024-11-14 DIAGNOSIS — Z13.6 SCREENING FOR CARDIOVASCULAR CONDITION: ICD-10-CM

## 2024-11-14 DIAGNOSIS — M51.9 LUMBAR DISC DISEASE: ICD-10-CM

## 2024-11-14 DIAGNOSIS — R20.2 PARESTHESIA: ICD-10-CM

## 2024-11-14 DIAGNOSIS — R53.83 LETHARGY: ICD-10-CM

## 2024-11-14 DIAGNOSIS — G89.29 CHRONIC PAIN OF RIGHT KNEE: ICD-10-CM

## 2024-11-14 DIAGNOSIS — R35.1 NOCTURIA: ICD-10-CM

## 2024-11-14 DIAGNOSIS — Z12.11 COLON CANCER SCREENING: ICD-10-CM

## 2024-11-14 DIAGNOSIS — Z00.01 ENCOUNTER FOR GENERAL ADULT MEDICAL EXAMINATION WITH ABNORMAL FINDINGS: ICD-10-CM

## 2024-11-14 DIAGNOSIS — N52.9 ERECTILE DYSFUNCTION, UNSPECIFIED ERECTILE DYSFUNCTION TYPE: ICD-10-CM

## 2024-11-14 DIAGNOSIS — Z12.5 SCREENING PSA (PROSTATE SPECIFIC ANTIGEN): ICD-10-CM

## 2024-11-14 DIAGNOSIS — J44.89 ASTHMATIC BRONCHITIS , CHRONIC: ICD-10-CM

## 2024-11-14 DIAGNOSIS — J45.20 MILD INTERMITTENT ASTHMA WITHOUT COMPLICATION: ICD-10-CM

## 2024-11-14 DIAGNOSIS — Z77.090 HISTORY OF ASBESTOS EXPOSURE: ICD-10-CM

## 2024-11-14 DIAGNOSIS — M79.10 MYALGIA: ICD-10-CM

## 2024-11-14 DIAGNOSIS — I25.10 CORONARY ARTERY CALCIFICATION SEEN ON CAT SCAN: ICD-10-CM

## 2024-11-14 RX ORDER — ATORVASTATIN CALCIUM 20 MG/1
20 TABLET, FILM COATED ORAL EVERY EVENING
Qty: 90 TABLET | Refills: 3 | Status: SHIPPED | OUTPATIENT
Start: 2024-11-14

## 2024-11-14 RX ORDER — TADALAFIL 5 MG/1
5 TABLET ORAL DAILY PRN
Qty: 90 TABLET | Refills: 3 | Status: SHIPPED | OUTPATIENT
Start: 2024-11-14

## 2024-11-14 RX ORDER — IBUPROFEN 800 MG/1
800 TABLET, FILM COATED ORAL EVERY 8 HOURS PRN
Qty: 270 TABLET | Refills: 3 | Status: SHIPPED | OUTPATIENT
Start: 2024-11-14

## 2024-11-14 RX ORDER — ALBUTEROL SULFATE 90 UG/1
2 INHALANT RESPIRATORY (INHALATION) EVERY 4 HOURS PRN
Qty: 18 G | Refills: 3
Start: 2024-11-14

## 2024-11-14 NOTE — PROGRESS NOTES
Subjective   Jeffrey Aviles JR is a 69 y.o. male here for his annual physical with me. Jeffrey is here for coordination of medical care, to discuss health maintenance, disease prevention as well as to followup on medical problems. Patient has been followed by me since 1986. Patient's last CPE was 10-. Activity level is moderate. Exercises 0 per week. Appetite is good. Feels well with many complaints. Energy level is good. Sleeps well. Patient's last colonoscopy was 1- with Dr. Magdaleno. He is advised to repeat in 7 years. Patient is doing routine self skin exam  1 x yearly, advised to check skin monthly . Patient is doing routine self-breast exams twice a year. Advised to check breasts monthly.  Patient is checking testicles twice a year.  Advised to check testicles monthly.    Lab Results   Component Value Date    PSA 0.7 10/31/2024        History of Present Illness  Follow up pulmonary nodule  Diabetes  He presents for his follow-up diabetic visit. He has type 2 diabetes mellitus. His disease course has been worsening. Pertinent negatives for hypoglycemia include no dizziness, nervousness/anxiousness or speech difficulty. Pertinent negatives for diabetes include no blurred vision, no chest pain, no fatigue, no polydipsia, no polyphagia, no polyuria, no weakness and no weight loss.   Hyperlipidemia  This is a chronic problem. The current episode started more than 1 year ago. The problem is controlled. Recent lipid tests were reviewed and are high. Factors aggravating his hyperlipidemia include fatty foods. Pertinent negatives include no chest pain, myalgias or shortness of breath. Current antihyperlipidemic treatment includes statins. The current treatment provides no improvement of lipids.   Arthritis  Presents for follow-up visit. He reports no joint swelling. The symptoms have been stable. Pertinent negatives include no diarrhea, dysuria, fatigue, fever, rash or weight loss. Compliance with total  regimen is %. Compliance with medications is %.   Erectile Dysfunction  This is a chronic problem. The current episode started more than 1 year ago. The problem is unchanged. He reports no anxiety. Irritative symptoms do not include frequency or urgency. Pertinent negatives include no chills, dysuria or hematuria.        The following portions of the patient's history were reviewed and updated as appropriate: allergies, current medications, past family history, past medical history, past social history, past surgical history, and problem list.    Past Medical History:   Diagnosis Date    Abnormal chest CT     Bilateral hearing loss     History of asbestos exposure     Mixed hyperlipidemia        Family History   Problem Relation Age of Onset    Cerebral aneurysm Mother          at 42    Hypertension Mother     Hyperlipidemia Father     Hypertension Father     Other Father         spinal stenosis    Asthma Father         living at 86    Prostate cancer Father     Cancer Father         Prostate cancer  at 87    Breast cancer Sister     Hyperlipidemia Sister     Cancer Brother 63        Prostate    Hypertension Brother     Hyperlipidemia Brother     Other Brother 30        Hepatitis    Diabetes Maternal Uncle     Heart disease Maternal Uncle     Lung cancer Maternal Uncle     Stroke Maternal Grandmother     Prostate cancer Maternal Grandfather        Social History     Socioeconomic History    Marital status:    Tobacco Use    Smoking status: Never     Passive exposure: Never    Smokeless tobacco: Never   Vaping Use    Vaping status: Never Used   Substance and Sexual Activity    Alcohol use: No    Drug use: No    Sexual activity: Yes     Partners: Female       Social History     Social History Narrative     2 x.  First wife  for 2.5 years and no children with her.   2nd in  has 1 son together and he adopted his wives daughter.  Just the 2 of them at home, children  are out of the house.  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  Caffeine  5-6 cups coffee daily.  Wears seatbelts 65% of the time, advised to wear seatbelt 100% of time.  Exercise none.  Hobbies building cigar box guitars, wind chimes and fountains.        Past Surgical History:   Procedure Laterality Date    COLONOSCOPY  06/2016    Repeat 6-21-6-22    PAROTID DUCT LIGATION         No current outpatient medications on file prior to visit.     No current facility-administered medications on file prior to visit.       No Known Allergies    Review of Systems   Constitutional:  Negative for appetite change, chills, fatigue, fever, unexpected weight gain and unexpected weight loss.   HENT:  Negative for congestion, dental problem, ear discharge, ear pain, hearing loss, nosebleeds, postnasal drip, rhinorrhea, sinus pressure, sneezing, sore throat, tinnitus and voice change.         Last dental exam 8-2024 with Dentist in [place]-Doing well   Eyes:  Negative for blurred vision, double vision, pain, redness and visual disturbance.        Last vision exam 2-2024 with Dr. Castellanos-Doing well   Respiratory:  Negative for cough, shortness of breath, wheezing and stridor.    Cardiovascular:  Negative for chest pain, palpitations and leg swelling.   Gastrointestinal:  Negative for abdominal pain, anal bleeding, blood in stool, constipation, diarrhea, nausea, rectal pain, vomiting, GERD and indigestion.        21 BM weekly   Endocrine: Negative for cold intolerance, heat intolerance, polydipsia, polyphagia and polyuria.        Sex Drive  He is a 3.5  She is a 3.5   Genitourinary:  Positive for erectile dysfunction. Negative for difficulty urinating, dysuria, frequency, hematuria and urgency.   Musculoskeletal:  Positive for arthralgias and arthritis. Negative for back pain, joint swelling, myalgias, neck pain and neck stiffness.   Skin:  Negative for color change,  "dry skin and rash.   Neurological:  Negative for dizziness, syncope, speech difficulty, weakness, light-headedness, headache and memory problem.   Hematological:  Does not bruise/bleed easily.   Psychiatric/Behavioral:  Negative for decreased concentration, sleep disturbance, depressed mood and stress. The patient is not nervous/anxious.        Objective   Visit Vitals  /66 (BP Location: Left arm, Patient Position: Sitting, Cuff Size: Adult)   Pulse 64   Temp 97.1 °F (36.2 °C) (Temporal)   Resp 18   Ht 177.8 cm (70\")   Wt 97.7 kg (215 lb 6.4 oz)   SpO2 97%   BMI 30.91 kg/m²     Physical Exam  Constitutional:       General: He is not in acute distress.     Appearance: He is well-developed. He is not diaphoretic.   HENT:      Head: Normocephalic.      Right Ear: Hearing, tympanic membrane, ear canal and external ear normal.      Left Ear: Hearing, tympanic membrane, ear canal and external ear normal.      Nose: Nose normal.      Mouth/Throat:      Lips: Pink.      Mouth: Mucous membranes are moist.      Pharynx: Oropharynx is clear. No oropharyngeal exudate.   Eyes:      General: Lids are normal. No scleral icterus.        Right eye: No discharge.         Left eye: No discharge.      Extraocular Movements: Extraocular movements intact.      Conjunctiva/sclera: Conjunctivae normal.      Pupils: Pupils are equal, round, and reactive to light.      Comments: Wear glasses.   Neck:      Trachea: Trachea normal.   Cardiovascular:      Rate and Rhythm: Normal rate and regular rhythm.      Pulses:           Dorsalis pedis pulses are 1+ on the right side and 0 on the left side.        Posterior tibial pulses are 1+ on the right side and 1+ on the left side.      Heart sounds: Normal heart sounds. No murmur heard.  Pulmonary:      Effort: Pulmonary effort is normal.      Breath sounds: Normal breath sounds. No wheezing.   Chest:      Chest wall: No tenderness.   Breasts:     Right: Normal. No swelling, bleeding, inverted " nipple, mass, nipple discharge, skin change or tenderness.      Left: Normal. No swelling, bleeding, inverted nipple, mass, nipple discharge, skin change or tenderness.   Abdominal:      General: Bowel sounds are normal. There is no distension.      Palpations: Abdomen is soft. There is no mass.      Tenderness: There is no abdominal tenderness.      Hernia: No hernia is present.   Genitourinary:     Penis: Normal and uncircumcised. No tenderness.       Testes: Normal.      Prostate: Normal.      Rectum: Normal.   Musculoskeletal:         General: No tenderness or deformity. Normal range of motion.      Cervical back: Full passive range of motion without pain, normal range of motion and neck supple.   Lymphadenopathy:      Cervical: No cervical adenopathy.   Skin:     General: Skin is warm and dry.      Findings: No erythema or rash.      Comments: Skin tag bilateral axilla.  Seb. Keratosis scattered over the trunk.  Nevus of the left lower eyelid.  Left buttock large skin tag.  Mild bunion of the left foot.     Neurological:      General: No focal deficit present.      Mental Status: He is alert and oriented to person, place, and time.      Cranial Nerves: Cranial nerves 2-12 are intact. No cranial nerve deficit.      Sensory: Sensation is intact. No sensory deficit.      Motor: Motor function is intact. No abnormal muscle tone.      Coordination: Coordination is intact. Coordination normal.      Gait: Gait is intact.      Deep Tendon Reflexes: Reflexes normal.   Psychiatric:         Behavior: Behavior normal.         Thought Content: Thought content normal.         Judgment: Judgment normal.         Assessment & Plan   Problem List Items Addressed This Visit          Medium    Mixed hyperlipidemia    Current Assessment & Plan      Lipid and CMP done 10-31-24, read by me, reviewed with pt.  Trig. 103 up from 91, Tot. Chol. 123 up from 122, HDL 39 down from 41, LDL 65 up from 63  Slightly worse.  Advised restart  Lipitor 20 mg daily due to no improvement off of Lipitor.    Advised CT calcium screening, pt. Agrees and order given to patient.  Encouraged to watch fatty intake, exercise more, and lose weight.   No medication.  Due to trial of being off Lipitor due to myalgias  Goals developed at last visit were not met close to goal  Follow up in 4 months  Care management needs are self-addressed. Self-management abilities addressed and patient is capable of managing his own disease.           Relevant Medications    atorvastatin (LIPITOR) 20 MG tablet    Other Relevant Orders    Lipid Panel With / Chol / HDL Ratio    Comprehensive Metabolic Panel    Type 2 diabetes mellitus with diabetic autonomic neuropathy, without long-term current use of insulin - Primary    Overview     Last eye exam 1-2024 with Dr. Castellanos         Current Assessment & Plan     A1c and Microalbumin/creatinine ratio done 10-31-24, read by me, reviewed with pt. A1c was 6.3 up from 6.1 and Microalbumin/creatinine ratio was 3 down from 4  Worsening.  Discussed Jardiance and injectables, pt. Declines at present.  Encouraged to watch sugar intake, exercise more and lose weight.   Compliant with medication.  Patient tolerated Metformin well without side effects. I feel the benefits of the medication outweigh the risks.   Monitoring sugar at home.   Follow up in 4 months  Care management needs are self-addressed.  Self-management abilities addressed and patient is capable of managing his own disease.           Relevant Medications    metFORMIN (GLUCOPHAGE) 1000 MG tablet    glucose blood test strip    Other Relevant Orders    Hemoglobin A1c       Low    1st degree AV block    Current Assessment & Plan     New dx.  12 lead EKG 10-31-24, read by me, reviewed with pt. EKG showed sinus bradycardia with vent rate of 50 and a new 1st degree AV block from 11-. Asymptomatic repeat next year          Adenomatous colon polyp    Overview     1-30-24 by   Rasta-advised  to repeat 1-2031         Current Assessment & Plan     1-30-24 by Dr. Magdaleno-advised  to repeat 1-2031         Displacement of cervical intervertebral disc    Overview     Xary done 2022 showed arthritis         Current Assessment & Plan     Doing well.  Patient tolerated  mg PRN well without side effects. I feel the benefits of the medication outweigh the risks.          Relevant Medications    ibuprofen (ADVIL,MOTRIN) 800 MG tablet    Mild intermittent asthma without complication    Overview     Followed with allergist 2 x yearly who does PFT's         Current Assessment & Plan     Doing well.  Patient tolerated Albuterol well without side effects. I feel the benefits of the medication outweigh the risks.   Advised to avoid dust and smoke exposure              Relevant Medications    albuterol sulfate  (90 Base) MCG/ACT inhaler    Paresthesia    Overview     Feet         Current Assessment & Plan     Intermittent and mild, pt. Declines work up or treatment.            Unprioritized    Arthritis, multiple joint involvement    Current Assessment & Plan     Moderate.  CRP done 10-31-24, read by me, reviewed with pt.  CRP was <1.  Patient tolerated IBU well without side effects. I feel the benefits of the medication outweigh the risks.          Bilateral hearing loss    Current Assessment & Plan     Stable.  Advised to wear hearing protection and avopid noise exposure            Bradycardia    Overview     Found on EKG 11-         Current Assessment & Plan     Stable.  12 lead EKG 10-31-24, read by me, reviewed with pt. EKG showed sinus bradycardia with vent rate of 50 and a new 1st degree AV block from 11-. Asymptomatic repeat next year          Chronic allergic rhinitis due to pollen    Current Assessment & Plan     Doing well with OTC Clairtin PRN.  Patient tolerated Claritin well without side effects. I feel the benefits of the medication outweigh the risks.           Relevant Medications    ibuprofen (ADVIL,MOTRIN) 800 MG tablet    Chronic pain of right knee    Current Assessment & Plan     Mild.  Xray done 10-31-24, results reviewed with pt.  Mild degenerative changes.  Advised quad exercises and try Voltaren gel, pt. Declines PT and sports medicine referral         Colon cancer screening    Current Assessment & Plan     Advised repeat colonoscopy         Constipation    Current Assessment & Plan     Doing well with Metformin use.         Coronary artery calcification seen on CAT scan    Overview     Found on CAT scan done 2023--keep risk factors low         Current Assessment & Plan     Advised CT calcium scoring, pt. Agrees and orders given to patient.  Benefits and risk of 81 mg aspirin discussed with patient.  Advised him a number from his calcium score would to help us determine this.         Relevant Medications    tadalafil (Cialis) 5 MG tablet    Encounter for general adult medical examination with abnormal findings    Current Assessment & Plan     Encouraged to do self-breast exam, self-testicle exams, and self derm exams. Congratulated on using seat belts.  Encouraged to do annual physical exams.  Immunization status reviewed.           Erectile dysfunction    Overview     9/10 success--1 Cialis does well          Current Assessment & Plan     Mild.  Patient tolerated Cialis well without side effects. I feel the benefits of the medication outweigh the risks.          Relevant Medications    tadalafil (Cialis) 5 MG tablet    Family history of polyps in the colon    Overview     Last colonoscopy done          Current Assessment & Plan     Advised repeat colonoscopy         Family history of prostate cancer    Overview     Dad dx at age 78,  at 84  Brother dx in  at age 56-living         Current Assessment & Plan     Advised yrly PSA. PSA done 10-31-24, read by me, reviewed with pt. PSA was 0.7 up from 0.6         History of  asbestos exposure    Overview       Short peroid of time at age 19         Current Assessment & Plan     Advised CT low dose and order given to patient.         Immunization counseling    Overview     Pneu 20 given 10-31-24  T-Dap 5-3-2016  Due for COVID Shingrix flu and pt delines them but has agreed to check on coverage of Shingrix with drug store.         Current Assessment & Plan     Patient will check on coverage of Shingrix at drug store          RESOLVED: Lethargy    Current Assessment & Plan     Mild, normal for age thus delete.  CBC and TSH done 10-31-24, read by me, reviewed with pt.  TSH was normal, CBC showed MCHC 31.1 down from 32.6         Lumbar disc disease    Overview     Found on x-ray done August 16, 2022 and reviewed with patient         Current Assessment & Plan     Intermittent and mild.  Xray done 10-31-24, results reviewed with pt.  Discussed PT and CT or MRI, pt. Declines all.  Patient tolerated IBU  well without side effects. I feel the benefits of the medication outweigh the risks.          Myalgia    Current Assessment & Plan     Intermittent and mild.  CPK done 10-31-24, read by me, reviewed with pt.  CPK was 46 down from 50         RESOLVED: Nocturia    Overview     1x nightly         Current Assessment & Plan     Mild, normal for age thus delete.  U/A done 10-31-24, read by me, reviewed with pt.  U/A was normal         Overweight    Current Assessment & Plan     Patient's (Body mass index is 30.91 kg/m².) indicates that they are overweight with health conditions that include hypertension and dyslipidemias . Weight is improving with lifestyle modifications. BMI is above average; BMI management plan is completed. We discussed portion control and increasing exercise.          Pulmonary nodule    Overview     CT low dose chest 10-25-23         Current Assessment & Plan     Advised CT low dose and will discuss results at follow up         Relevant Medications    albuterol sulfate   (90 Base) MCG/ACT inhaler    Other Relevant Orders    CT Chest Low Dose Wo    Screening for cardiovascular condition    Current Assessment & Plan     Patient given order for CT calcium score and vascular screen         Relevant Orders    CT Cardiac Calcium Score Without Dye    Vascular Screening (Bundle) CAR    Screening PSA (prostate specific antigen)    Current Assessment & Plan     Doing well.  PSA done 10-31-24, read by me, reviewed with pt. PSA was 0.7 up from 0.6          Other Visit Diagnoses       Asthmatic bronchitis , chronic        Doing well    Relevant Medications    albuterol sulfate  (90 Base) MCG/ACT inhaler                      Encouraged to check his skin, testicles and breasts monthly. Reviewed exercising regularly, eating a balanced diet, immunizations and if due, patient counselled to check with insurance company for coverage;, and regularly checking skin and breasts.

## 2024-11-14 NOTE — ASSESSMENT & PLAN NOTE
Doing well.  Patient tolerated  mg PRN well without side effects. I feel the benefits of the medication outweigh the risks.

## 2024-11-14 NOTE — ASSESSMENT & PLAN NOTE
Mild.  Patient tolerated Cialis well without side effects. I feel the benefits of the medication outweigh the risks.

## 2024-11-14 NOTE — ASSESSMENT & PLAN NOTE
Patient's (Body mass index is 30.91 kg/m².) indicates that they are overweight with health conditions that include hypertension and dyslipidemias . Weight is improving with lifestyle modifications. BMI is above average; BMI management plan is completed. We discussed portion control and increasing exercise.

## 2024-11-14 NOTE — ASSESSMENT & PLAN NOTE
Doing well with OTC Clairtin PRN.  Patient tolerated Claritin well without side effects. I feel the benefits of the medication outweigh the risks.    Hospital to [address]                                                                        77 y.o.   female    [address]   Room: [facility] 3 ORTHOPEDICS  Unit Phone# :  [phone]      [address]  hospitals [address] [address]  Dept: [phone]  Loc: [phone]                    SITUATION     Admitted:  3/17/2021         Attending Provider:  Jozef Londono MD       Consultations:  IP CONSULT TO NEUROSURGERY  IP CONSULT TO HOSPITALIST  IP CONSULT TO ORTHOPEDIC SURGERY    PCP:  Hasmukh Allison MD   [phone]    Treatment Team: Attending Provider: Jozef Londono MD; Consulting Provider: Sofya Michael MD; Consulting Provider: Chelsy Beltran MD; Nurse Practitioner: Kylah Ernst NP; Consulting Provider: Bree Burt NP; Utilization Review: Breanna Hemphill RN; Consulting Provider: Kodak Mix MD; Utilization Review: Pablo Willson RN; Care Manager: Billie Campbell; Care Manager: Broderick Bowers Primary Nurse: Connor Faye    Admitting Dx:  Back pain [M54.9]  Compression fracture of body of thoracic vertebra ([facility] [address] [S22.000A]       Principal Problem: <principal problem not specified>    2 Days Post-Op of   Procedure(s):  WOUND EXPLORATION AND REMOVAL OF DRAIN   BY: Sofya Michael MD             ON: 3/23/2021                  Code Status: Full Code                Advance Directives: No flowsheet data found. (Send w/patient)   Not Received       Isolation:  There are currently no Active Isolations       MDRO: No current active infections    Pain Medications given:  tramadol    Last dose: 3/25/2021 at  1004    Special Equipment needed: no  Type of equipment:         BACKGROUND     Allergies: Allergies   Allergen Reactions    Pcn [Penicillins] Hives       Past Medical History:   Diagnosis Date    Diabetes ([facility] [address]        History reviewed. No pertinent surgical history.     No medications prior to admission. Hard scripts included in transfer packet yes    Vaccinations: There is no immunization history on file for this patient. Readmission Risks:    Known Risks: The [employer] Index tool is an evidenced based tool that has more automatic generated information. The tool looks at many different items such as the age of the patient, how many times they were admitted in the last calendar year, current length of stay in the hospital and their diagnosis. All of these items are pulled automatically from information documented in the chart from various places and will generate a score that predicts whether a patient is at low (less than 13), medium (13-20) or high (21 or greater) risk of being readmitted.         ASSESSMENT                Temp: 98.1 °F (36.7 °C) (03/25/21 1111) Pulse (Heart Rate): 94 (03/25/21 1111)     Resp Rate: 18 (03/25/21 1111)           BP: 130/63 (03/25/21 1111)     O2 Sat (%): 96 % (03/25/21 1111)     Weight: 68 kg (150 lb)    Height: 5' 7\" (170.2 cm) (03/24/21 1537)       If above not within 1 hour of discharge:    BP:_____  P:____  R:____ T:_____ O2 Sat: ___%  O2: ______    Active Orders   Diet    DIET DIABETIC CONSISTENT CARB Regular         Orientation: oriented to time, place, person and situation     Active Behaviors: None                                   Active Lines/Drains:  (Peg Tube / [place] / CL or S/L?): no    Urinary Status: Voiding, External catheter     Last BM: Last Bowel Movement Date: 03/25/21     Skin Integrity: Incision (comment)             Mobility: Very limited   Weight Bearing Status: WBAT (Weight Bearing as Tolerated)      Gait Training  Assistive Device: Brace/Splint, Gait belt(HHA)  Ambulation - Level of Assistance: Minimal assistance  Distance (ft): 3 Feet (ft)  Interventions: Manual cues, Safety awareness training, Tactile cues, Verbal cues         Lab Results   Component Value Date/Time    Glucose 110 (H) 03/22/2021 05:11 AM    Hemoglobin A1c 5.3 03/19/2021 03:36 AM    INR 1.0 03/18/2021 02:34 PM    HGB 8.3 (L) 03/22/2021 05:11 AM    HGB 8.7 (L) 03/21/2021 05:49 AM        RECOMMENDATION     See After Visit Summary (AVS) for:  · Discharge instructions  · After [address]   · Special equipment needed (entered pre-discharge by Care Management)  · Medication Reconciliation    · Follow up Appointment(s)         Report given/sent by:  Gideon Ray                    Verbal report given to:  Syed Luque LPN  FAXED to:           Estimated discharge time:  3/25/2021 at 1300

## 2024-11-14 NOTE — ASSESSMENT & PLAN NOTE
Advised CT calcium scoring, pt. Agrees and orders given to patient.  Benefits and risk of 81 mg aspirin discussed with patient.  Advised him a number from his calcium score would to help us determine this.

## 2024-11-19 ENCOUNTER — TELEPHONE (OUTPATIENT)
Dept: FAMILY MEDICINE CLINIC | Facility: CLINIC | Age: 69
End: 2024-11-19
Payer: MEDICARE

## 2024-11-19 NOTE — TELEPHONE ENCOUNTER
Please call Fawn or private message her regarding the CT. She stated that patient has no history of smoking so we can;t use Low Dose Lung cancer screening for dx. She suggested using Low Dose fu for the CT.

## 2024-11-20 DIAGNOSIS — R91.1 PULMONARY NODULE: Primary | ICD-10-CM

## 2024-11-26 ENCOUNTER — HOSPITAL ENCOUNTER (OUTPATIENT)
Dept: CT IMAGING | Facility: HOSPITAL | Age: 69
Discharge: HOME | End: 2024-11-26
Admitting: FAMILY MEDICINE
Payer: MEDICARE

## 2024-11-26 ENCOUNTER — HOSPITAL ENCOUNTER (OUTPATIENT)
Dept: CT IMAGING | Facility: HOSPITAL | Age: 69
Discharge: HOME | End: 2024-11-26

## 2024-11-26 ENCOUNTER — APPOINTMENT (OUTPATIENT)
Dept: CT IMAGING | Facility: HOSPITAL | Age: 69
End: 2024-11-26

## 2024-11-26 ENCOUNTER — HOSPITAL ENCOUNTER (OUTPATIENT)
Dept: ULTRASOUND IMAGING | Facility: HOSPITAL | Age: 69
Discharge: HOME | End: 2024-11-26

## 2024-11-26 ENCOUNTER — TELEPHONE (OUTPATIENT)
Dept: FAMILY MEDICINE CLINIC | Facility: CLINIC | Age: 69
End: 2024-11-26
Payer: MEDICARE

## 2024-11-26 DIAGNOSIS — E04.1 THYROID NODULE: Primary | ICD-10-CM

## 2024-11-26 LAB
BH CV LEA LEFT PTA DISTAL PSV: 102.9 CM/S
BH CV LEA RIGHT PTA DISTAL PSV: 91 CM/S
BH CV VAS SCREENING CAROTID CCA LEFT: 50.4 CM/SEC
BH CV VAS SCREENING CAROTID CCA RIGHT: 56.7 CM/SEC
BH CV VAS SCREENING CAROTID ICA LEFT: 43.1 CM/SEC
BH CV VAS SCREENING CAROTID ICA RIGHT: 59.9 CM/SEC
BH CV VAS STUDY COMMENTS THYROID NODULE LT 1ST MEASURE: 0.5 CM
BH CV VAS STUDY COMMENTS THYROID NODULE LT 2ND MEASUR: 0.5 CM
BH CV VAS STUDY COMMENTS THYROID NODULE RT 1ST MEASURE: 0.4 CM
BH CV XLRA MEAS - MID AO DIAM: 1.4 CM
BH CV XLRA MEAS - PAD LEFT ABI PT: 1
BH CV XLRA MEAS - PAD LEFT ARM: 187 MMHG
BH CV XLRA MEAS - PAD LEFT LEG PT: 140 MMHG
BH CV XLRA MEAS - PAD RIGHT ABI PT: 1.2
BH CV XLRA MEAS - PAD RIGHT ARM: 154 MMHG
BH CV XLRA MEAS - PAD RIGHT LEG PT: 160 MMHG
BH CV XLRA MEAS LEFT DIST CCA EDV: 10.7 CM/SEC
BH CV XLRA MEAS LEFT DIST CCA PSV: 50.4 CM/SEC
BH CV XLRA MEAS LEFT ICA/CCA RATIO: 0.9
BH CV XLRA MEAS LEFT PROX ICA EDV: 14.2 CM/SEC
BH CV XLRA MEAS LEFT PROX ICA PSV: 43.1 CM/SEC
BH CV XLRA MEAS RIGHT DIST CCA EDV: 10.7 CM/SEC
BH CV XLRA MEAS RIGHT DIST CCA PSV: 56.7 CM/SEC
BH CV XLRA MEAS RIGHT ICA/CCA RATIO: 1.1
BH CV XLRA MEAS RIGHT PROX ICA EDV: 17.5 CM/SEC
BH CV XLRA MEAS RIGHT PROX ICA PSV: 59.9 CM/SEC

## 2024-11-26 PROCEDURE — 75571 CT HRT W/O DYE W/CA TEST: CPT

## 2024-11-26 PROCEDURE — 71250 CT THORAX DX C-: CPT

## 2024-11-26 PROCEDURE — 93799 UNLISTED CV SVC/PROCEDURE: CPT

## 2024-11-26 NOTE — TELEPHONE ENCOUNTER
Patient notified that vascular screen showed thyroid nodules and needs U/S, pt. Agrees and order placed

## 2024-11-27 ENCOUNTER — TELEPHONE (OUTPATIENT)
Dept: FAMILY MEDICINE CLINIC | Facility: CLINIC | Age: 69
End: 2024-11-27
Payer: MEDICARE

## 2024-11-27 NOTE — TELEPHONE ENCOUNTER
Patient notified that Cardiac calcium was 316.4 and is at moderate risk for a cardiac event, offered appt to discuss with Dr. Keen, pt. Declined and wants to discuss at follow up in March

## 2024-12-12 ENCOUNTER — HOSPITAL ENCOUNTER (OUTPATIENT)
Dept: ULTRASOUND IMAGING | Facility: HOSPITAL | Age: 69
Discharge: HOME | End: 2024-12-12
Admitting: FAMILY MEDICINE
Payer: MEDICARE

## 2024-12-12 PROCEDURE — 76536 US EXAM OF HEAD AND NECK: CPT

## 2025-01-13 ENCOUNTER — OFFICE VISIT (OUTPATIENT)
Dept: FAMILY MEDICINE CLINIC | Facility: CLINIC | Age: 70
End: 2025-01-13
Payer: MEDICARE

## 2025-01-13 VITALS
TEMPERATURE: 97.2 F | DIASTOLIC BLOOD PRESSURE: 78 MMHG | BODY MASS INDEX: 31.24 KG/M2 | WEIGHT: 218.2 LBS | HEART RATE: 67 BPM | HEIGHT: 70 IN | SYSTOLIC BLOOD PRESSURE: 125 MMHG | RESPIRATION RATE: 14 BRPM

## 2025-01-13 DIAGNOSIS — E78.2 MIXED HYPERLIPIDEMIA: ICD-10-CM

## 2025-01-13 DIAGNOSIS — I25.10 CORONARY ARTERY CALCIFICATION SEEN ON CAT SCAN: Primary | ICD-10-CM

## 2025-01-13 DIAGNOSIS — E04.1 THYROID NODULE: ICD-10-CM

## 2025-01-13 NOTE — ASSESSMENT & PLAN NOTE
Advised to keep lipids in good control. Patient tolerated lipitor well without side effects. I feel the benefits of the medication outweigh the risks.

## 2025-01-13 NOTE — PROGRESS NOTES
Subjective   Jeffrey Aviles JR is a 69 y.o. male.   No chief complaint on file.    History of Present Illness  Follow up to discuss the results of the Cardiac screening.        The following portions of the patient's history were reviewed and updated as appropriate: allergies, current medications, past family history, past medical history, past social history, past surgical history, and problem list.    Past Medical History:   Diagnosis Date    Abnormal chest CT     Bilateral hearing loss     History of asbestos exposure     Mixed hyperlipidemia        Past Surgical History:   Procedure Laterality Date    COLONOSCOPY  2016    Repeat -    PAROTID DUCT LIGATION          Family History   Problem Relation Age of Onset    Cerebral aneurysm Mother          at 42    Hypertension Mother     Hyperlipidemia Father     Hypertension Father     Other Father         spinal stenosis    Asthma Father         living at 86    Prostate cancer Father     Cancer Father         Prostate cancer  at 87    Breast cancer Sister     Hyperlipidemia Sister     Cancer Brother 63        Prostate    Hypertension Brother     Hyperlipidemia Brother     Other Brother 30        Hepatitis    Diabetes Maternal Uncle     Heart disease Maternal Uncle     Lung cancer Maternal Uncle     Stroke Maternal Grandmother     Prostate cancer Maternal Grandfather         Social History     Socioeconomic History    Marital status:    Tobacco Use    Smoking status: Never     Passive exposure: Never    Smokeless tobacco: Never   Vaping Use    Vaping status: Never Used   Substance and Sexual Activity    Alcohol use: No    Drug use: No    Sexual activity: Yes     Partners: Female       Outpatient Medications Prior to Visit   Medication Sig Dispense Refill    albuterol sulfate  (90 Base) MCG/ACT inhaler Inhale 2 puffs Every 4 (Four) Hours As Needed for Wheezing. 18 g 3    atorvastatin (LIPITOR) 20 MG tablet Take 1 tablet by mouth Every  "Evening. 90 tablet 3    glucose blood test strip Blood sugar 1 x daily      ibuprofen (ADVIL,MOTRIN) 800 MG tablet Take 1 tablet by mouth Every 8 (Eight) Hours As Needed for Mild Pain. 270 tablet 3    metFORMIN (GLUCOPHAGE) 1000 MG tablet Take 1 tablet by mouth 2 (Two) Times a Day. 180 tablet 3    tadalafil (Cialis) 5 MG tablet Take 1 tablet by mouth Daily As Needed for Erectile Dysfunction. Take 4 pills on 1st day then 1 daily 90 tablet 3     No facility-administered medications prior to visit.        Review of Systems   Respiratory:  Negative for chest tightness and shortness of breath.    Cardiovascular:  Negative for chest pain, palpitations and leg swelling.       Objective   Visit Vitals  /78 (BP Location: Left arm, Patient Position: Sitting, Cuff Size: Large Adult)   Pulse 67   Temp 97.2 °F (36.2 °C)   Resp 14   Ht 177.8 cm (70\")   Wt 99 kg (218 lb 3.2 oz)   BMI 31.31 kg/m²     Physical Exam  Vitals and nursing note reviewed.   Constitutional:       Appearance: He is well-developed.   HENT:      Head: Normocephalic.   Neck:      Thyroid: No thyromegaly.      Vascular: No carotid bruit.      Trachea: Trachea normal.   Cardiovascular:      Rate and Rhythm: Normal rate and regular rhythm.      Heart sounds: No murmur heard.     No friction rub. No gallop.   Pulmonary:      Effort: Pulmonary effort is normal. No respiratory distress.      Breath sounds: Normal breath sounds. No wheezing.   Chest:      Chest wall: No tenderness.   Musculoskeletal:      Cervical back: Neck supple.   Skin:     General: Skin is dry.      Findings: No rash.      Nails: There is no clubbing.   Neurological:      Mental Status: He is alert and oriented to person, place, and time.   Psychiatric:         Behavior: Behavior is cooperative.         Assessment & Plan   Problem List Items Addressed This Visit          Medium    Mixed hyperlipidemia    Overview     Due to calcium score 316 we will keep LDL less than 70         Current " Assessment & Plan      Advised to keep lipids in good control. Patient tolerated lipitor well without side effects. I feel the benefits of the medication outweigh the risks.              Unprioritized    Coronary artery calcification seen on CAT scan - Primary    Overview     Found on CAT scan done October 25, 2023--keep risk factors low         Current Assessment & Plan     Calcium score 316-Moderate risk of heart problems in the next 5 years.   Will refer to cardiologist for an evaluation.          Relevant Orders    Ambulatory Referral to Cardiology    Thyroid nodule    Current Assessment & Plan     Very small-Patient had a Ultrasound of the thyroid- Does not meet criteria to repeat the ultrasound.

## 2025-01-13 NOTE — ASSESSMENT & PLAN NOTE
Very small-Patient had a Ultrasound of the thyroid- Does not meet criteria to repeat the ultrasound.

## 2025-01-13 NOTE — ASSESSMENT & PLAN NOTE
Calcium score 316-Moderate risk of heart problems in the next 5 years.   Will refer to cardiologist for an evaluation.

## 2025-02-05 ENCOUNTER — OFFICE VISIT (OUTPATIENT)
Dept: FAMILY MEDICINE CLINIC | Facility: CLINIC | Age: 70
End: 2025-02-05
Payer: MEDICARE

## 2025-02-05 VITALS
RESPIRATION RATE: 18 BRPM | HEIGHT: 70 IN | BODY MASS INDEX: 31.35 KG/M2 | TEMPERATURE: 98 F | DIASTOLIC BLOOD PRESSURE: 78 MMHG | WEIGHT: 219 LBS | HEART RATE: 80 BPM | OXYGEN SATURATION: 96 % | SYSTOLIC BLOOD PRESSURE: 136 MMHG

## 2025-02-05 DIAGNOSIS — E11.43 TYPE 2 DIABETES MELLITUS WITH DIABETIC AUTONOMIC NEUROPATHY, WITHOUT LONG-TERM CURRENT USE OF INSULIN: ICD-10-CM

## 2025-02-05 DIAGNOSIS — J01.00 ACUTE NON-RECURRENT MAXILLARY SINUSITIS: ICD-10-CM

## 2025-02-05 DIAGNOSIS — E66.9 OBESITY (BMI 30-39.9): ICD-10-CM

## 2025-02-05 DIAGNOSIS — J40 BRONCHITIS WITH WHEEZING: Primary | ICD-10-CM

## 2025-02-05 DIAGNOSIS — R05.1 ACUTE COUGH: ICD-10-CM

## 2025-02-05 LAB
EXPIRATION DATE: NORMAL
FLUAV AG UPPER RESP QL IA.RAPID: NOT DETECTED
FLUBV AG UPPER RESP QL IA.RAPID: NOT DETECTED
INTERNAL CONTROL: NORMAL
Lab: NORMAL
SARS-COV-2 AG UPPER RESP QL IA.RAPID: NOT DETECTED

## 2025-02-05 PROCEDURE — 1126F AMNT PAIN NOTED NONE PRSNT: CPT | Performed by: FAMILY MEDICINE

## 2025-02-05 PROCEDURE — 1160F RVW MEDS BY RX/DR IN RCRD: CPT | Performed by: FAMILY MEDICINE

## 2025-02-05 PROCEDURE — 99214 OFFICE O/P EST MOD 30 MIN: CPT | Performed by: FAMILY MEDICINE

## 2025-02-05 PROCEDURE — 1159F MED LIST DOCD IN RCRD: CPT | Performed by: FAMILY MEDICINE

## 2025-02-05 PROCEDURE — 87428 SARSCOV & INF VIR A&B AG IA: CPT | Performed by: FAMILY MEDICINE

## 2025-02-05 RX ORDER — PREDNISONE 10 MG/1
10 TABLET ORAL TAKE AS DIRECTED
Qty: 16 TABLET | Refills: 0 | Status: SHIPPED | OUTPATIENT
Start: 2025-02-05 | End: 2025-02-15

## 2025-02-05 RX ORDER — DOXYCYCLINE 100 MG/1
100 CAPSULE ORAL 2 TIMES DAILY
Qty: 20 CAPSULE | Refills: 0 | Status: SHIPPED | OUTPATIENT
Start: 2025-02-05

## 2025-02-05 NOTE — PROGRESS NOTES
"Chief Complaint  URI    Subjective     CC  Problem List  Visit Diagnosis   Encounters  Notes  Medications  Labs  Result Review Imaging  Media    Jeffrey Aviles JR presents to [facility] for   URI   This is a new problem. The current episode started in the past 7 days. The problem has been gradually worsening. There has been no fever. Associated symptoms include congestion, coughing and rhinorrhea. Pertinent negatives include no abdominal pain, chest pain, diarrhea, dysuria, ear pain, headaches, nausea, plugged ear sensation or vomiting. Treatments tried: mucinex estefani davdi. The treatment provided mild relief.       Review of Systems   Constitutional:  Negative for appetite change and fever.   HENT:  Positive for congestion, rhinorrhea and sinus pressure. Negative for ear pain.    Respiratory:  Positive for cough. Negative for shortness of breath.    Cardiovascular:  Negative for chest pain, palpitations and leg swelling.   Gastrointestinal:  Negative for abdominal pain, diarrhea, nausea and vomiting.   Endocrine: Negative for cold intolerance, heat intolerance, polydipsia and polyuria.   Genitourinary:  Negative for dysuria.   Hematological:  Negative for adenopathy. Does not bruise/bleed easily.        Objective   Vital Signs:   /78   Pulse 80   Temp 98 °F (36.7 °C) (Temporal)   Resp 18   Ht 177.8 cm (70\")   Wt 99.3 kg (219 lb)   SpO2 96%   BMI 31.42 kg/m²     Physical Exam  Constitutional:       Appearance: He is obese.   HENT:      Nose:      Right Sinus: Maxillary sinus tenderness present.      Left Sinus: Maxillary sinus tenderness present.   Cardiovascular:      Rate and Rhythm: Normal rate and regular rhythm.      Pulses: Normal pulses.      Heart sounds: No murmur heard.  Pulmonary:      Effort: Pulmonary effort is normal.      Breath sounds: Normal breath sounds.   Musculoskeletal:      Cervical back: Neck supple.      Right lower leg: No edema.      " Left lower leg: No edema.   Lymphadenopathy:      Cervical: No cervical adenopathy.   Skin:     Findings: No rash.   Neurological:      Mental Status: He is alert.        Result Review :Labs  Result Review  Imaging  Med Tab  Media                 Assessment and Plan CC Problem List  Visit Diagnosis  ROS  Review (Popup)  Health Maintenance  Quality  BestPractice  Medications  SmartSets  SnapShot Encounters  Media  Problem List Items Addressed This Visit          Unprioritized    Obesity (BMI 30-39.9)    Current Assessment & Plan     Patient's (Body mass index is 31.42 kg/m².) indicates that they are obese (BMI >30) with health conditions that include diabetes mellitus . Weight is unchanged. BMI  is above average; BMI management plan is completed. We discussed portion control and increasing exercise.          Type 2 diabetes mellitus with diabetic autonomic neuropathy, without long-term current use of insulin    Overview     Last eye exam 1-2024 with Dr. Castellanos         Current Assessment & Plan       A1c 10/31/2024, 6.3 should support steroids f.u [facility] 1 mo.           Other Visit Diagnoses       Bronchitis with wheezing    -  Primary    fluids saline flushes steroids and follow up if sxs don't improve in 48 hrs and resolve over 1 week.    Relevant Medications    predniSONE (DELTASONE) 10 MG tablet    Acute non-recurrent maxillary sinusitis        abx saline flushes, fluids humidity    Relevant Medications    doxycycline (MONODOX) 100 MG capsule    Acute cough        neg flu, neg cough    Relevant Orders    POCT SARS-CoV-2 + Flu Antigen TERESE (Completed)            Follow Up Instructions Charge Capture  Follow-up Communications  Return if symptoms worsen or fail to improve.  Patient was given instructions and counseling regarding his condition or for health maintenance advice. Please see specific information pulled into the AVS if appropriate.

## 2025-02-14 PROBLEM — E66.9 OBESITY (BMI 30-39.9): Status: ACTIVE | Noted: 2019-07-31

## 2025-02-15 NOTE — ASSESSMENT & PLAN NOTE
Patient's (Body mass index is 31.42 kg/m².) indicates that they are obese (BMI >30) with health conditions that include diabetes mellitus . Weight is unchanged. BMI  is above average; BMI management plan is completed. We discussed portion control and increasing exercise.

## 2025-03-07 LAB
ALBUMIN SERPL-MCNC: 4.6 G/DL (ref 3.9–4.9)
ALP SERPL-CCNC: 61 IU/L (ref 44–121)
ALT SERPL-CCNC: 20 IU/L (ref 0–44)
AST SERPL-CCNC: 15 IU/L (ref 0–40)
BILIRUB SERPL-MCNC: 0.6 MG/DL (ref 0–1.2)
BUN SERPL-MCNC: 18 MG/DL (ref 8–27)
BUN/CREAT SERPL: 23 (ref 10–24)
CALCIUM SERPL-MCNC: 9.4 MG/DL (ref 8.6–10.2)
CHLORIDE SERPL-SCNC: 102 MMOL/L (ref 96–106)
CHOLEST SERPL-MCNC: 165 MG/DL (ref 100–199)
CHOLEST/HDLC SERPL: 3.8 RATIO (ref 0–5)
CO2 SERPL-SCNC: 27 MMOL/L (ref 20–29)
CREAT SERPL-MCNC: 0.8 MG/DL (ref 0.76–1.27)
EGFRCR SERPLBLD CKD-EPI 2021: 96 ML/MIN/1.73
GLOBULIN SER CALC-MCNC: 2.7 G/DL (ref 1.5–4.5)
GLUCOSE SERPL-MCNC: 108 MG/DL (ref 70–99)
HBA1C MFR BLD: 6.2 % (ref 4.8–5.6)
HDLC SERPL-MCNC: 43 MG/DL
LDLC SERPL CALC-MCNC: 98 MG/DL (ref 0–99)
POTASSIUM SERPL-SCNC: 5.1 MMOL/L (ref 3.5–5.2)
PROT SERPL-MCNC: 7.3 G/DL (ref 6–8.5)
SODIUM SERPL-SCNC: 140 MMOL/L (ref 134–144)
TRIGL SERPL-MCNC: 137 MG/DL (ref 0–149)
VLDLC SERPL CALC-MCNC: 24 MG/DL (ref 5–40)

## 2025-03-13 ENCOUNTER — OFFICE VISIT (OUTPATIENT)
Dept: FAMILY MEDICINE CLINIC | Facility: CLINIC | Age: 70
End: 2025-03-13
Payer: MEDICARE

## 2025-03-13 VITALS
DIASTOLIC BLOOD PRESSURE: 80 MMHG | WEIGHT: 219.6 LBS | TEMPERATURE: 97.3 F | HEART RATE: 81 BPM | RESPIRATION RATE: 18 BRPM | SYSTOLIC BLOOD PRESSURE: 130 MMHG | BODY MASS INDEX: 31.44 KG/M2 | OXYGEN SATURATION: 97 % | HEIGHT: 70 IN

## 2025-03-13 DIAGNOSIS — E78.2 MIXED HYPERLIPIDEMIA: Primary | ICD-10-CM

## 2025-03-13 DIAGNOSIS — E11.43 TYPE 2 DIABETES MELLITUS WITH DIABETIC AUTONOMIC NEUROPATHY, WITHOUT LONG-TERM CURRENT USE OF INSULIN: ICD-10-CM

## 2025-03-13 DIAGNOSIS — E66.9 OBESITY (BMI 30-39.9): ICD-10-CM

## 2025-03-13 NOTE — ASSESSMENT & PLAN NOTE
Patient's (Body mass index is 31.51 kg/m².) indicates that they are obese (BMI >30) with health conditions that include hypertension and dyslipidemias . Weight is unchanged. BMI  is above average; BMI management plan is completed. We discussed low calorie, low carb based diet program, portion control, and increasing exercise.

## 2025-03-13 NOTE — PROGRESS NOTES
Subjective   Jeffrey Aviles JR is a 69 y.o. male.   Chief Complaint   Patient presents with    Hyperlipidemia    Diabetes     Hyperlipidemia  This is a chronic problem. The current episode started more than 1 year ago. The problem is controlled. Pertinent negatives include no chest pain, myalgias or shortness of breath. Current antihyperlipidemic treatment includes statins.   Diabetes  He presents for his follow-up diabetic visit. He has type 2 diabetes mellitus. His disease course has been worsening. Pertinent negatives for diabetes include no chest pain, no fatigue, no polyphagia, no polyuria and no weight loss.        The following portions of the patient's history were reviewed and updated as appropriate: allergies, current medications, past family history, past medical history, past social history, past surgical history, and problem list.    Past Medical History:   Diagnosis Date    Abnormal chest CT     Bilateral hearing loss     History of asbestos exposure     Mixed hyperlipidemia        Past Surgical History:   Procedure Laterality Date    COLONOSCOPY  2016    Repeat -    PAROTID DUCT LIGATION          Family History   Problem Relation Age of Onset    Cerebral aneurysm Mother          at 42    Hypertension Mother     Hyperlipidemia Father     Hypertension Father     Other Father         spinal stenosis    Asthma Father         living at 86    Prostate cancer Father     Cancer Father         Prostate cancer  at 87    Breast cancer Sister     Hyperlipidemia Sister     Cancer Brother 63        Prostate    Hypertension Brother     Hyperlipidemia Brother     Other Brother 30        Hepatitis    Diabetes Maternal Uncle     Heart disease Maternal Uncle     Lung cancer Maternal Uncle     Stroke Maternal Grandmother     Prostate cancer Maternal Grandfather         Social History     Socioeconomic History    Marital status:    Tobacco Use    Smoking status: Never     Passive exposure:  "Never    Smokeless tobacco: Never   Vaping Use    Vaping status: Never Used   Substance and Sexual Activity    Alcohol use: No    Drug use: No    Sexual activity: Yes     Partners: Female       Outpatient Medications Prior to Visit   Medication Sig Dispense Refill    albuterol sulfate  (90 Base) MCG/ACT inhaler Inhale 2 puffs Every 4 (Four) Hours As Needed for Wheezing. 18 g 3    atorvastatin (LIPITOR) 20 MG tablet Take 1 tablet by mouth Every Evening. 90 tablet 3    glucose blood test strip Blood sugar 1 x daily      ibuprofen (ADVIL,MOTRIN) 800 MG tablet Take 1 tablet by mouth Every 8 (Eight) Hours As Needed for Mild Pain. 270 tablet 3    metFORMIN (GLUCOPHAGE) 1000 MG tablet Take 1 tablet by mouth 2 (Two) Times a Day. 180 tablet 3    tadalafil (Cialis) 5 MG tablet Take 1 tablet by mouth Daily As Needed for Erectile Dysfunction. Take 4 pills on 1st day then 1 daily 90 tablet 3    doxycycline (MONODOX) 100 MG capsule Take 1 capsule by mouth 2 (Two) Times a Day. 20 capsule 0     No facility-administered medications prior to visit.        Review of Systems   Constitutional:  Negative for fatigue, unexpected weight gain and unexpected weight loss.   Eyes:  Negative for visual disturbance.   Respiratory:  Negative for shortness of breath.    Cardiovascular:  Negative for chest pain, palpitations and leg swelling.   Gastrointestinal:  Negative for nausea.   Endocrine: Negative for polyphagia and polyuria.   Genitourinary:  Negative for frequency.   Musculoskeletal:  Negative for myalgias.   Skin:  Negative for dry skin and skin lesions.   Neurological:  Negative for syncope, numbness and headache.       Objective   Visit Vitals  /80 (BP Location: Left arm, Patient Position: Sitting, Cuff Size: Adult)   Pulse 81   Temp 97.3 °F (36.3 °C) (Temporal)   Resp 18   Ht 177.8 cm (70\")   Wt 99.6 kg (219 lb 9.6 oz)   SpO2 97%   BMI 31.51 kg/m²     Physical Exam  Vitals and nursing note reviewed.   Constitutional:      "  Appearance: He is well-developed.   HENT:      Head: Normocephalic.   Neck:      Thyroid: No thyromegaly.      Vascular: No carotid bruit.      Trachea: Trachea normal.   Cardiovascular:      Rate and Rhythm: Normal rate and regular rhythm.      Heart sounds: No murmur heard.     No friction rub. No gallop.   Pulmonary:      Effort: Pulmonary effort is normal. No respiratory distress.      Breath sounds: Normal breath sounds. No wheezing.   Chest:      Chest wall: No tenderness.   Musculoskeletal:      Cervical back: Neck supple.   Skin:     General: Skin is dry.      Findings: No rash.      Nails: There is no clubbing.   Neurological:      Mental Status: He is alert and oriented to person, place, and time.   Psychiatric:         Behavior: Behavior is cooperative.         Assessment & Plan   Problem List Items Addressed This Visit          Medium    Mixed hyperlipidemia - Primary    Overview   Due to calcium score 316 we will keep LDL less than 70         Current Assessment & Plan    Lipid and CMP done 3-6-2025, read by me, reviewed with pt.  Trig. 137 up from 103, Tot. Chol. 165 up from 123, HDL 43 up from 39, LDL 98 up from 65  Slightly Worsening but not at goal  Encouraged to watch fatty intake, exercise more, and lose weight.   compliant with medication  Patient tolerated lipitor well without side effects. I feel the benefits of the medication outweigh the risks.    Is not getting adequate diet and exercise  Goals developed at last visit were not met   Follow up in 4  months  Care management needs are self-addressed.  Self-management abilities addressed and patient is capable of managing his own disease.           Relevant Orders    Comprehensive Metabolic Panel    Lipid Panel    Type 2 diabetes mellitus with diabetic autonomic neuropathy, without long-term current use of insulin    Overview   Last eye exam 1-15-25 with Dr. Castellanos         Current Assessment & Plan   A1c  done 3-6-2025, read by me, reviewed with  pt.  A1c was 6.2 down from 6.3  Improved and close to goal  Encouraged to watch sugar intake, exercise more and lose weight.   compliant with medication. Patient tolerated metformin well without side effects. I feel the benefits of the medication outweigh the risks.    Not monitoring sugar at home.   Follow up in 4 months  Care management needs are self-addressed.  Self-management abilities addressed and patient is capable of managing his own disease.           Relevant Orders    Hemoglobin A1c       Unprioritized    Obesity (BMI 30-39.9)    Current Assessment & Plan   Patient's (Body mass index is 31.51 kg/m².) indicates that they are obese (BMI >30) with health conditions that include hypertension and dyslipidemias . Weight is unchanged. BMI  is above average; BMI management plan is completed. We discussed low calorie, low carb based diet program, portion control, and increasing exercise.

## 2025-03-13 NOTE — ASSESSMENT & PLAN NOTE
Lipid and CMP done 3-6-2025, read by me, reviewed with pt.  Trig. 137 up from 103, Tot. Chol. 165 up from 123, HDL 43 up from 39, LDL 98 up from 65  Slightly Worsening but not at goal  Encouraged to watch fatty intake, exercise more, and lose weight.   compliant with medication  Patient tolerated lipitor well without side effects. I feel the benefits of the medication outweigh the risks.    Is not getting adequate diet and exercise  Goals developed at last visit were not met   Follow up in 4  months  Care management needs are self-addressed.  Self-management abilities addressed and patient is capable of managing his own disease.

## 2025-03-13 NOTE — ASSESSMENT & PLAN NOTE
A1c  done 3-6-2025, read by me, reviewed with pt.  A1c was 6.2 down from 6.3  Improved and close to goal  Encouraged to watch sugar intake, exercise more and lose weight.   compliant with medication. Patient tolerated metformin well without side effects. I feel the benefits of the medication outweigh the risks.    Not monitoring sugar at home.   Follow up in 4 months  Care management needs are self-addressed.  Self-management abilities addressed and patient is capable of managing his own disease.

## 2025-03-17 ENCOUNTER — OFFICE VISIT (OUTPATIENT)
Dept: CARDIOLOGY | Facility: CLINIC | Age: 70
End: 2025-03-17
Payer: MEDICARE

## 2025-03-17 VITALS
WEIGHT: 220 LBS | OXYGEN SATURATION: 97 % | SYSTOLIC BLOOD PRESSURE: 128 MMHG | HEIGHT: 70 IN | HEART RATE: 62 BPM | BODY MASS INDEX: 31.5 KG/M2 | DIASTOLIC BLOOD PRESSURE: 79 MMHG | RESPIRATION RATE: 18 BRPM

## 2025-03-17 DIAGNOSIS — I25.10 CORONARY ARTERY CALCIFICATION SEEN ON CAT SCAN: ICD-10-CM

## 2025-03-17 DIAGNOSIS — R06.02 SHORTNESS OF BREATH: ICD-10-CM

## 2025-03-17 DIAGNOSIS — E78.2 MIXED HYPERLIPIDEMIA: Primary | ICD-10-CM

## 2025-03-17 DIAGNOSIS — E11.43 TYPE 2 DIABETES MELLITUS WITH DIABETIC AUTONOMIC NEUROPATHY, WITHOUT LONG-TERM CURRENT USE OF INSULIN: ICD-10-CM

## 2025-03-17 RX ORDER — ASPIRIN 81 MG/1
81 TABLET, CHEWABLE ORAL DAILY
COMMUNITY

## 2025-03-17 NOTE — PROGRESS NOTES
Date of Office Visit: 2025  Encounter Provider: Dr. Remy Yee  Place of Service: [facility] [place]  Patient Name: Jeffrey Aviles JR  :1955  Zhou Keen MD    Chief Complaint   Patient presents with    Hyperlipidemia    Consult    Diabetes     History of Present Illness:    I am pleased to see Mr. Aviles in my office today as a new consultation    As you know, patient is 69-year-old white gentleman whose past medical history significant for diabetes mellitus, hyperlipidemia, who is referred to me for abnormal CT calcium score's.    Recently seen by PCP and patient underwent CT calcium score for coronary artery.  Patient had left main 0 score, LAD has 289, LCx as 1.7 and RCA had 11.6.  Total score was 316.4.    Patient denies any chest pain.  Patient has mild shortness of breath on moderate to heavy activity.  Patient denies any chest pain or tightness or heaviness.  No orthopnea PND no syncope or presyncope.    I would recommend to proceed with stress test and echocardiogram.  If this test is negative observation and risk factor modification recommended.  Increase Lipitor Target goal is less than 70 close to 54 LDL        Past Medical History:   Diagnosis Date    Abnormal chest CT     Bilateral hearing loss     Diabetes mellitus     History of asbestos exposure     Mixed hyperlipidemia          Past Surgical History:   Procedure Laterality Date    COLONOSCOPY  2016    Repeat -6-    PAROTID DUCT LIGATION             Current Outpatient Medications:     albuterol sulfate  (90 Base) MCG/ACT inhaler, Inhale 2 puffs Every 4 (Four) Hours As Needed for Wheezing., Disp: 18 g, Rfl: 3    aspirin 81 MG chewable tablet, Chew 1 tablet Daily., Disp: , Rfl:     atorvastatin (LIPITOR) 20 MG tablet, Take 1 tablet by mouth Every Evening., Disp: 90 tablet, Rfl: 3    glucose blood test strip, Blood sugar 1 x daily, Disp: , Rfl:     ibuprofen (ADVIL,MOTRIN) 800 MG tablet, Take 1 tablet  by mouth Every 8 (Eight) Hours As Needed for Mild Pain., Disp: 270 tablet, Rfl: 3    metFORMIN (GLUCOPHAGE) 1000 MG tablet, Take 1 tablet by mouth 2 (Two) Times a Day., Disp: 180 tablet, Rfl: 3    tadalafil (Cialis) 5 MG tablet, Take 1 tablet by mouth Daily As Needed for Erectile Dysfunction. Take 4 pills on 1st day then 1 daily, Disp: 90 tablet, Rfl: 3      Social History     Socioeconomic History    Marital status:    Tobacco Use    Smoking status: Never     Passive exposure: Never    Smokeless tobacco: Never   Vaping Use    Vaping status: Never Used   Substance and Sexual Activity    Alcohol use: No    Drug use: No    Sexual activity: Yes     Partners: Female         Review of Systems   Constitutional: Negative for chills and fever.   HENT:  Negative for ear discharge and nosebleeds.    Eyes:  Negative for discharge and redness.   Cardiovascular:  Negative for chest pain, orthopnea, palpitations, paroxysmal nocturnal dyspnea and syncope.   Respiratory:  Positive for shortness of breath. Negative for cough and wheezing.    Endocrine: Negative for heat intolerance.   Skin:  Negative for rash.   Musculoskeletal:  Negative for arthritis and myalgias.   Gastrointestinal:  Negative for abdominal pain, melena, nausea and vomiting.   Genitourinary:  Negative for dysuria and hematuria.   Neurological:  Negative for dizziness, light-headedness, numbness and tremors.   Psychiatric/Behavioral:  Negative for depression. The patient is not nervous/anxious.        Procedures      ECG 12 Lead    Date/Time: 3/17/2025 1:43 PM  Performed by: Remy Yee MD    Authorized by: Remy Yee MD  Previous ECG: no previous ECG available  Rhythm: sinus rhythm  Other findings: non-specific ST-T wave changes    Clinical impression: normal ECG          ECG 12 Lead    (Results Pending)           Objective:    /79 (BP Location: Right arm, Patient Position: Sitting, Cuff Size: Large Adult)   Pulse 62   Resp 18   Ht 177 cm  "(69.69\")   Wt 99.8 kg (220 lb)   SpO2 97%   BMI 31.85 kg/m²         Constitutional:       Appearance: Well-developed.   Eyes:      General: No scleral icterus.        Right eye: No discharge.   HENT:      Head: Normocephalic and atraumatic.   Neck:      Thyroid: No thyromegaly.      Lymphadenopathy: No cervical adenopathy.   Pulmonary:      Effort: Pulmonary effort is normal. No respiratory distress.      Breath sounds: Normal breath sounds. No wheezing. No rales.   Cardiovascular:      Normal rate. Regular rhythm.      No gallop.    Edema:     Peripheral edema absent.   Abdominal:      Tenderness: There is no abdominal tenderness.   Skin:     Findings: No erythema or rash.   Neurological:      Mental Status: Alert and oriented to person, place, and time.             Assessment:       Diagnosis Plan   1. Mixed hyperlipidemia  ECG 12 Lead    Adult Transthoracic Echo Complete W/ Cont if Necessary Per Protocol    Stress Test With Myocardial Perfusion One Day      2. Type 2 diabetes mellitus with diabetic autonomic neuropathy, without long-term current use of insulin  ECG 12 Lead    Adult Transthoracic Echo Complete W/ Cont if Necessary Per Protocol    Stress Test With Myocardial Perfusion One Day      3. Coronary artery calcification seen on CAT scan  Adult Transthoracic Echo Complete W/ Cont if Necessary Per Protocol    Stress Test With Myocardial Perfusion One Day      4. Shortness of breath  Adult Transthoracic Echo Complete W/ Cont if Necessary Per Protocol    Stress Test With Myocardial Perfusion One Day               Plan:       MDM:    1.  CT calcium score:    Proceed with stress test    2.  Shortness of breath:    I would recommend cardiac echocardiogram    3.  Diabetes mellitus:    Diet modification discussed continue metformin weight loss emphasized increase aerobic activity    4.  Mixed hyperlipidemia:    Patient is on Lipitor.  Recent LDL is 98 I would recommend that patient should increase Lipitor  "

## 2025-03-18 ENCOUNTER — PATIENT ROUNDING (BHMG ONLY) (OUTPATIENT)
Dept: CARDIOLOGY | Facility: CLINIC | Age: 70
End: 2025-03-18
Payer: MEDICARE

## 2025-03-24 ENCOUNTER — TELEPHONE (OUTPATIENT)
Dept: CARDIOLOGY | Facility: CLINIC | Age: 70
End: 2025-03-24
Payer: MEDICARE

## 2025-03-24 NOTE — TELEPHONE ENCOUNTER
Caller: NOAH ARELLANO    Relationship to patient: Emergency Contact    Best call back number: [phone]    Patient is needing: PT'S SPOUSE CALLED TO CLARIFY STRESS TEST INSTRUCTIONS. PT IS DIABETIC AND WANTS TO KNOW THE SPECIFICS OF WHETHER PT CAN EAT OR NOT. PLEASE CALL TO CLARIFY.

## 2025-03-27 ENCOUNTER — HOSPITAL ENCOUNTER (OUTPATIENT)
Dept: CARDIOLOGY | Facility: HOSPITAL | Age: 70
Discharge: HOME | End: 2025-03-27
Admitting: INTERNAL MEDICINE
Payer: MEDICARE

## 2025-03-27 ENCOUNTER — HOSPITAL ENCOUNTER (OUTPATIENT)
Dept: NUCLEAR MEDICINE | Facility: HOSPITAL | Age: 70
Discharge: HOME | End: 2025-03-27
Payer: MEDICARE

## 2025-03-27 DIAGNOSIS — E11.43 TYPE 2 DIABETES MELLITUS WITH DIABETIC AUTONOMIC NEUROPATHY, WITHOUT LONG-TERM CURRENT USE OF INSULIN: ICD-10-CM

## 2025-03-27 DIAGNOSIS — R06.02 SHORTNESS OF BREATH: ICD-10-CM

## 2025-03-27 DIAGNOSIS — E78.2 MIXED HYPERLIPIDEMIA: ICD-10-CM

## 2025-03-27 DIAGNOSIS — I25.10 CORONARY ARTERY CALCIFICATION SEEN ON CAT SCAN: ICD-10-CM

## 2025-03-27 LAB
AORTIC DIMENSIONLESS INDEX: 0.69 (DI)
AV MEAN PRESS GRAD SYS DOP V1V2: 4 MMHG
AV VMAX SYS DOP: 139 CM/SEC
BH CV ECHO LEFT VENTRICLE GLOBAL LONGITUDINAL STRAIN: -17.8 %
BH CV ECHO MEAS - ACS: 2.1 CM
BH CV ECHO MEAS - AO MAX PG: 7.7 MMHG
BH CV ECHO MEAS - AO V2 VTI: 34.3 CM
BH CV ECHO MEAS - AVA(I,D): 2.39 CM2
BH CV ECHO MEAS - EDV(CUBED): 132.7 ML
BH CV ECHO MEAS - EDV(MOD-SP4): 134 ML
BH CV ECHO MEAS - EF(MOD-SP4): 51.3 %
BH CV ECHO MEAS - ESV(CUBED): 54.9 ML
BH CV ECHO MEAS - ESV(MOD-SP4): 65.3 ML
BH CV ECHO MEAS - FS: 25.5 %
BH CV ECHO MEAS - IVS/LVPW: 1 CM
BH CV ECHO MEAS - IVSD: 0.9 CM
BH CV ECHO MEAS - LA DIMENSION: 4.4 CM
BH CV ECHO MEAS - LAT PEAK E' VEL: 11.3 CM/SEC
BH CV ECHO MEAS - LV DIASTOLIC VOL/BSA (35-75): 61.6 CM2
BH CV ECHO MEAS - LV MASS(C)D: 163.6 GRAMS
BH CV ECHO MEAS - LV MAX PG: 3.7 MMHG
BH CV ECHO MEAS - LV MEAN PG: 2 MMHG
BH CV ECHO MEAS - LV SYSTOLIC VOL/BSA (12-30): 30 CM2
BH CV ECHO MEAS - LV V1 MAX: 95.9 CM/SEC
BH CV ECHO MEAS - LV V1 VTI: 23.7 CM
BH CV ECHO MEAS - LVIDD: 5.1 CM
BH CV ECHO MEAS - LVIDS: 3.8 CM
BH CV ECHO MEAS - LVOT AREA: 3.5 CM2
BH CV ECHO MEAS - LVOT DIAM: 2.1 CM
BH CV ECHO MEAS - LVPWD: 0.9 CM
BH CV ECHO MEAS - MED PEAK E' VEL: 8.8 CM/SEC
BH CV ECHO MEAS - MV A MAX VEL: 40.9 CM/SEC
BH CV ECHO MEAS - MV DEC SLOPE: 331 CM/SEC2
BH CV ECHO MEAS - MV DEC TIME: 0.24 SEC
BH CV ECHO MEAS - MV E MAX VEL: 78.1 CM/SEC
BH CV ECHO MEAS - MV E/A: 1.91
BH CV ECHO MEAS - MV MAX PG: 3.4 MMHG
BH CV ECHO MEAS - MV MEAN PG: 1 MMHG
BH CV ECHO MEAS - MV P1/2T: 86 MSEC
BH CV ECHO MEAS - MV V2 VTI: 29.2 CM
BH CV ECHO MEAS - MVA(P1/2T): 2.6 CM2
BH CV ECHO MEAS - MVA(VTI): 2.8 CM2
BH CV ECHO MEAS - PA ACC TIME: 0.11 SEC
BH CV ECHO MEAS - PA V2 MAX: 131 CM/SEC
BH CV ECHO MEAS - PI END-D VEL: 84.1 CM/SEC
BH CV ECHO MEAS - RAP SYSTOLE: 3 MMHG
BH CV ECHO MEAS - RV MAX PG: 2.3 MMHG
BH CV ECHO MEAS - RV V1 MAX: 75.8 CM/SEC
BH CV ECHO MEAS - RV V1 VTI: 20.2 CM
BH CV ECHO MEAS - RVSP: 22.5 MMHG
BH CV ECHO MEAS - SV(LVOT): 82.1 ML
BH CV ECHO MEAS - SV(MOD-SP4): 68.7 ML
BH CV ECHO MEAS - SVI(LVOT): 37.8 ML/M2
BH CV ECHO MEAS - SVI(MOD-SP4): 31.6 ML/M2
BH CV ECHO MEAS - TAPSE (>1.6): 2.14 CM
BH CV ECHO MEAS - TR MAX PG: 19.5 MMHG
BH CV ECHO MEAS - TR MAX VEL: 221 CM/SEC
BH CV ECHO MEASUREMENTS AVERAGE E/E' RATIO: 7.77
BH CV XLRA - RV BASE: 4.1 CM
BH CV XLRA - RV LENGTH: 8.6 CM
BH CV XLRA - RV MID: 3.1 CM
BH CV XLRA - TDI S': 11.1 CM/SEC
LEFT ATRIUM VOLUME INDEX: 34.6 ML/M2
SINUS: 3.3 CM
STJ: 2.7 CM

## 2025-03-27 PROCEDURE — 34310000005 TECHNETIUM TETROFOSMIN KIT: Performed by: INTERNAL MEDICINE

## 2025-03-27 PROCEDURE — A9502 TC99M TETROFOSMIN: HCPCS | Performed by: INTERNAL MEDICINE

## 2025-03-27 PROCEDURE — 93356 MYOCRD STRAIN IMG SPCKL TRCK: CPT

## 2025-03-27 PROCEDURE — 78452 HT MUSCLE IMAGE SPECT MULT: CPT

## 2025-03-27 PROCEDURE — 93017 CV STRESS TEST TRACING ONLY: CPT

## 2025-03-27 PROCEDURE — 93306 TTE W/DOPPLER COMPLETE: CPT

## 2025-03-27 RX ADMIN — TETROFOSMIN 1 DOSE: 1.38 INJECTION, POWDER, LYOPHILIZED, FOR SOLUTION INTRAVENOUS at 14:16

## 2025-03-27 RX ADMIN — TETROFOSMIN 1 DOSE: 1.38 INJECTION, POWDER, LYOPHILIZED, FOR SOLUTION INTRAVENOUS at 13:20

## 2025-03-29 LAB
BH CV REST NUCLEAR ISOTOPE DOSE: 10.2 MCI
BH CV STRESS BP STAGE 1: NORMAL
BH CV STRESS BP STAGE 2: NORMAL
BH CV STRESS BP STAGE 3: NORMAL
BH CV STRESS DURATION MIN STAGE 1: 3
BH CV STRESS DURATION MIN STAGE 2: 3
BH CV STRESS DURATION MIN STAGE 3: 3
BH CV STRESS DURATION SEC STAGE 1: 0
BH CV STRESS DURATION SEC STAGE 2: 0
BH CV STRESS DURATION SEC STAGE 3: 0
BH CV STRESS GRADE STAGE 1: 10
BH CV STRESS GRADE STAGE 2: 12
BH CV STRESS GRADE STAGE 3: 14
BH CV STRESS HR STAGE 1: 104
BH CV STRESS HR STAGE 2: 125
BH CV STRESS HR STAGE 3: 145
BH CV STRESS METS STAGE 1: 5
BH CV STRESS METS STAGE 2: 7.5
BH CV STRESS METS STAGE 3: 10
BH CV STRESS NUCLEAR ISOTOPE DOSE: 33 MCI
BH CV STRESS PROTOCOL 1: NORMAL
BH CV STRESS RECOVERY BP: NORMAL MMHG
BH CV STRESS RECOVERY HR: 90 BPM
BH CV STRESS SPEED STAGE 1: 1.7
BH CV STRESS SPEED STAGE 2: 2.5
BH CV STRESS SPEED STAGE 3: 3.4
BH CV STRESS STAGE 1: 1
BH CV STRESS STAGE 2: 2
BH CV STRESS STAGE 3: 3
MAXIMAL PREDICTED HEART RATE: 152 BPM
PERCENT MAX PREDICTED HR: 95.39 %
SPECT HRT GATED+EF W RNC IV: 62 %
STRESS BASELINE BP: NORMAL MMHG
STRESS BASELINE HR: 60 BPM
STRESS PERCENT HR: 112 %
STRESS POST ESTIMATED WORKLOAD: 8.8 METS
STRESS POST EXERCISE DUR MIN: 7 MIN
STRESS POST EXERCISE DUR SEC: 9 SEC
STRESS POST PEAK BP: NORMAL MMHG
STRESS POST PEAK HR: 145 BPM
STRESS TARGET HR: 129 BPM

## 2025-07-22 ENCOUNTER — OFFICE VISIT (OUTPATIENT)
Dept: FAMILY MEDICINE CLINIC | Facility: CLINIC | Age: 70
End: 2025-07-22
Payer: MEDICARE

## 2025-07-22 VITALS
HEART RATE: 81 BPM | OXYGEN SATURATION: 97 % | WEIGHT: 221.6 LBS | TEMPERATURE: 96.9 F | RESPIRATION RATE: 14 BRPM | DIASTOLIC BLOOD PRESSURE: 68 MMHG | SYSTOLIC BLOOD PRESSURE: 138 MMHG | BODY MASS INDEX: 32.82 KG/M2 | HEIGHT: 69 IN

## 2025-07-22 DIAGNOSIS — E11.43 TYPE 2 DIABETES MELLITUS WITH DIABETIC AUTONOMIC NEUROPATHY, WITHOUT LONG-TERM CURRENT USE OF INSULIN: ICD-10-CM

## 2025-07-22 DIAGNOSIS — E78.2 MIXED HYPERLIPIDEMIA: Primary | ICD-10-CM

## 2025-07-22 DIAGNOSIS — E66.9 OBESITY (BMI 30-39.9): ICD-10-CM

## 2025-07-22 PROBLEM — R93.1 AGATSTON CORONARY ARTERY CALCIUM SCORE BETWEEN 200 AND 399: Status: ACTIVE | Noted: 2023-11-05

## 2025-07-22 LAB
ALBUMIN SERPL-MCNC: 4.5 G/DL (ref 3.9–4.9)
ALP SERPL-CCNC: 66 IU/L (ref 44–121)
ALT SERPL-CCNC: 18 IU/L (ref 0–44)
AST SERPL-CCNC: 14 IU/L (ref 0–40)
BILIRUB SERPL-MCNC: 0.7 MG/DL (ref 0–1.2)
BUN SERPL-MCNC: 20 MG/DL (ref 8–27)
BUN/CREAT SERPL: 24 (ref 10–24)
CALCIUM SERPL-MCNC: 9.2 MG/DL (ref 8.6–10.2)
CHLORIDE SERPL-SCNC: 103 MMOL/L (ref 96–106)
CHOLEST SERPL-MCNC: 145 MG/DL (ref 100–199)
CO2 SERPL-SCNC: 24 MMOL/L (ref 20–29)
CREAT SERPL-MCNC: 0.84 MG/DL (ref 0.76–1.27)
EGFRCR SERPLBLD CKD-EPI 2021: 95 ML/MIN/1.73
GLOBULIN SER CALC-MCNC: 2.6 G/DL (ref 1.5–4.5)
GLUCOSE SERPL-MCNC: 126 MG/DL (ref 70–99)
HBA1C MFR BLD: 6.7 % (ref 4.8–5.6)
HDLC SERPL-MCNC: 37 MG/DL
LDLC SERPL CALC-MCNC: 83 MG/DL (ref 0–99)
POTASSIUM SERPL-SCNC: 5.2 MMOL/L (ref 3.5–5.2)
PROT SERPL-MCNC: 7.1 G/DL (ref 6–8.5)
SODIUM SERPL-SCNC: 140 MMOL/L (ref 134–144)
TRIGL SERPL-MCNC: 144 MG/DL (ref 0–149)
VLDLC SERPL CALC-MCNC: 25 MG/DL (ref 5–40)

## 2025-07-22 NOTE — PROGRESS NOTES
Subjective   Jeffrey Aviles JR is a 69 y.o. male.   Chief Complaint   Patient presents with    Hyperlipidemia    Diabetes     Hyperlipidemia  This is a chronic problem. The current episode started more than 1 year ago. The problem is controlled. Pertinent negatives include no chest pain, myalgias or shortness of breath. Current antihyperlipidemic treatment includes statins.   Diabetes  Visit type:  Follow-up  Diabetes type:  Type 2  Disease course:  Stable  Associated symptoms:     no chest pain, no fatigue and no weight loss    Symptom course:  Stable       The following portions of the patient's history were reviewed and updated as appropriate: allergies, current medications, past family history, past medical history, past social history, past surgical history, and problem list.    Past Medical History:   Diagnosis Date    Abnormal chest CT     Bilateral hearing loss     Diabetes mellitus     History of asbestos exposure     Mixed hyperlipidemia        Past Surgical History:   Procedure Laterality Date    COLONOSCOPY  2016    Repeat -    PAROTID DUCT LIGATION          Family History   Problem Relation Age of Onset    Cerebral aneurysm Mother          at 42    Hypertension Mother     Hyperlipidemia Father     Hypertension Father     Other Father         spinal stenosis    Asthma Father         living at 86    Prostate cancer Father     Cancer Father         Prostate cancer  at 87    Breast cancer Sister     Hyperlipidemia Sister     Cancer Brother 63        Prostate    Hypertension Brother     Hyperlipidemia Brother     Other Brother 30        Hepatitis    Diabetes Maternal Uncle     Heart disease Maternal Uncle     Lung cancer Maternal Uncle     Stroke Maternal Grandmother     Prostate cancer Maternal Grandfather         Social History     Socioeconomic History    Marital status:    Tobacco Use    Smoking status: Never     Passive exposure: Never    Smokeless tobacco: Never   Vaping  "Use    Vaping status: Never Used   Substance and Sexual Activity    Alcohol use: No    Drug use: No    Sexual activity: Yes     Partners: Female       Outpatient Medications Prior to Visit   Medication Sig Dispense Refill    albuterol sulfate  (90 Base) MCG/ACT inhaler Inhale 2 puffs Every 4 (Four) Hours As Needed for Wheezing. 18 g 3    aspirin 81 MG chewable tablet Chew 1 tablet Daily.      atorvastatin (LIPITOR) 20 MG tablet Take 1 tablet by mouth Every Evening. 90 tablet 3    glucose blood test strip Blood sugar 1 x daily      ibuprofen (ADVIL,MOTRIN) 800 MG tablet Take 1 tablet by mouth Every 8 (Eight) Hours As Needed for Mild Pain. 270 tablet 3    metFORMIN (GLUCOPHAGE) 1000 MG tablet Take 1 tablet by mouth 2 (Two) Times a Day. 180 tablet 3    tadalafil (Cialis) 5 MG tablet Take 1 tablet by mouth Daily As Needed for Erectile Dysfunction. Take 4 pills on 1st day then 1 daily 90 tablet 3     No facility-administered medications prior to visit.        Review of Systems   Constitutional:  Negative for fatigue, unexpected weight gain and unexpected weight loss.   Respiratory:  Negative for shortness of breath.    Cardiovascular:  Negative for chest pain, palpitations and leg swelling.   Gastrointestinal:  Negative for nausea.   Musculoskeletal:  Negative for myalgias.   Skin:  Negative for dry skin.   Neurological:  Negative for headache.       Objective   Visit Vitals  /68 (BP Location: Left arm, Patient Position: Sitting, Cuff Size: Adult)   Pulse 81   Temp 96.9 °F (36.1 °C)   Resp 14   Ht 175.3 cm (69\")   Wt 101 kg (221 lb 9.6 oz)   SpO2 97%   BMI 32.72 kg/m²     Physical Exam  Vitals and nursing note reviewed.   Constitutional:       Appearance: He is well-developed.   HENT:      Head: Normocephalic.   Neck:      Thyroid: No thyromegaly.      Vascular: No carotid bruit.      Trachea: Trachea normal.   Cardiovascular:      Rate and Rhythm: Normal rate and regular rhythm.      Heart sounds: No murmur " heard.     No friction rub. No gallop.   Pulmonary:      Effort: Pulmonary effort is normal. No respiratory distress.      Breath sounds: Normal breath sounds. No wheezing.   Chest:      Chest wall: No tenderness.   Musculoskeletal:      Cervical back: Neck supple.   Skin:     General: Skin is dry.      Findings: No rash.      Nails: There is no clubbing.   Neurological:      Mental Status: He is alert and oriented to person, place, and time.   Psychiatric:         Behavior: Behavior is cooperative.         Assessment & Plan   Problem List Items Addressed This Visit          Medium    Mixed hyperlipidemia - Primary    Overview   Due to calcium score 316 we will keep LDL less than 70         Current Assessment & Plan   Improved but HDL is 37 and not at goal  Encouraged to watch fatty intake, exercise more, and lose weight.   compliant with medication  Patient tolerated lipitor well without side effects. I feel the benefits of the medication outweigh the risks.    Is not getting adequate diet and exercise  Goals developed at last visit were not met   Follow up in  4 months  Care management needs are self-addressed. Self-management abilities addressed and patient is capable of managing his own disease.  ]         Type 2 diabetes mellitus with diabetic autonomic neuropathy, without long-term current use of insulin    Overview   Last eye exam 1-15-25 with Dr. Castellanos         Current Assessment & Plan   Worsening. Hgb A1c 6.7 up from 6.2  Discussed starting Jardiance 10 mg daily. Patient agrees to try this medication if it is not too expensive.  Benefits and risk discussed  Encouraged to watch sugar intake, exercise more and lose weight.   compliant with medication. Patient tolerated metformin well without side effects. I feel the benefits of the medication outweigh the risks.  -monitoring sugar at home.   Follow up in 4 months  Care management needs are self-addressed.  Self-management abilities addressed and patient is  capable of managing his own disease.           Relevant Medications    empagliflozin (Jardiance) 10 MG tablet tablet       Unprioritized    Obesity (BMI 30-39.9)    Current Assessment & Plan   Patient's (Body mass index is 32.72 kg/m².) indicates that they are obese (BMI >30) with health conditions that include hypertension and dyslipidemias . Weight is unchanged. BMI  is above average; BMI management plan is completed. We discussed low calorie, low carb based diet program, portion control, and increasing exercise.          Relevant Medications    empagliflozin (Jardiance) 10 MG tablet tablet

## 2025-07-22 NOTE — ASSESSMENT & PLAN NOTE
Improved but HDL is 37 and not at goal  Encouraged to watch fatty intake, exercise more, and lose weight.   compliant with medication  Patient tolerated lipitor well without side effects. I feel the benefits of the medication outweigh the risks.    Is not getting adequate diet and exercise  Goals developed at last visit were not met   Follow up in  4 months  Care management needs are self-addressed. Self-management abilities addressed and patient is capable of managing his own disease.  ]   General

## 2025-07-22 NOTE — ASSESSMENT & PLAN NOTE
Worsening. Hgb A1c 6.7 up from 6.2  Discussed starting Jardiance 10 mg daily. Patient agrees to try this medication if it is not too expensive.  Benefits and risk discussed  Encouraged to watch sugar intake, exercise more and lose weight.   compliant with medication. Patient tolerated metformin well without side effects. I feel the benefits of the medication outweigh the risks.  -monitoring sugar at home.   Follow up in 4 months  Care management needs are self-addressed.  Self-management abilities addressed and patient is capable of managing his own disease.

## 2025-07-22 NOTE — ASSESSMENT & PLAN NOTE
Patient's (Body mass index is 32.72 kg/m².) indicates that they are obese (BMI >30) with health conditions that include hypertension and dyslipidemias . Weight is unchanged. BMI  is above average; BMI management plan is completed. We discussed low calorie, low carb based diet program, portion control, and increasing exercise.